# Patient Record
Sex: FEMALE | Race: BLACK OR AFRICAN AMERICAN | NOT HISPANIC OR LATINO | Employment: UNEMPLOYED
[De-identification: names, ages, dates, MRNs, and addresses within clinical notes are randomized per-mention and may not be internally consistent; named-entity substitution may affect disease eponyms.]

---

## 2017-08-12 ENCOUNTER — HEALTH MAINTENANCE LETTER (OUTPATIENT)
Age: 22
End: 2017-08-12

## 2019-09-11 ENCOUNTER — NURSE TRIAGE (OUTPATIENT)
Dept: NURSING | Facility: CLINIC | Age: 24
End: 2019-09-11

## 2019-09-11 NOTE — TELEPHONE ENCOUNTER
Supriya is shaving urination problems as she is incontinent with urine and also has white discharge.  Denies fever.  Supriya feels that she has lost some bladder control since her last child.      Reason for Disposition    [1] Can't control passage of urine (i.e., urinary incontinence) AND [2] new onset (< 2 weeks) or worsening    Additional Information    Negative: Shock suspected (e.g., cold/pale/clammy skin, too weak to stand, low BP, rapid pulse)    Negative: Sounds like a life-threatening emergency to the triager    Negative: [1] Unable to urinate (or only a few drops) > 4 hours AND     [2] bladder feels very full (e.g., palpable bladder or strong urge to urinate)    Negative: [1] Decreased urination and [2] drinking very little AND [2] dehydration suspected (e.g., dark urine, no urine > 12 hours, very dry mouth, very lightheaded)    Negative: Patient sounds very sick or weak to the triager    Negative: Fever > 100.5 F (38.1 C)    Negative: Side (flank) or lower back pain present    Protocols used: URINARY SYMPTOMS-A-AH

## 2019-11-06 ENCOUNTER — HEALTH MAINTENANCE LETTER (OUTPATIENT)
Age: 24
End: 2019-11-06

## 2020-11-22 ENCOUNTER — HEALTH MAINTENANCE LETTER (OUTPATIENT)
Age: 25
End: 2020-11-22

## 2021-09-19 ENCOUNTER — HEALTH MAINTENANCE LETTER (OUTPATIENT)
Age: 26
End: 2021-09-19

## 2022-01-09 ENCOUNTER — HEALTH MAINTENANCE LETTER (OUTPATIENT)
Age: 27
End: 2022-01-09

## 2022-11-21 ENCOUNTER — HEALTH MAINTENANCE LETTER (OUTPATIENT)
Age: 27
End: 2022-11-21

## 2023-04-16 ENCOUNTER — HEALTH MAINTENANCE LETTER (OUTPATIENT)
Age: 28
End: 2023-04-16

## 2023-06-08 RX ORDER — CHOLECALCIFEROL (VITAMIN D3) 50 MCG
1 TABLET ORAL DAILY
COMMUNITY

## 2023-06-08 NOTE — PROGRESS NOTES
[facility] OR FOLLOW UP VISIT    Patient: Supriya Feng YOB: 1995    Date of Exam: 23    Arrival Time:  41 PM  Departure Time:  40 PM    Charge Phone (written on paperwork): [phone]    Please be advised that this client resides in a facility in which narcotic medications are not permitted. If pain management is needed, please prescribe an alternative medication.     Supriya Feng is a 28 year old who presents for the following    Patient presents with:  nexplanon : Nexplanon removal   lab work: Hep C and HIV  UTI: STI testing, urine is off in color, urine frequency, no discharge, small amount of odor    28year-old female presents for Nexplanon removal. Currently at [facility] for substance use treatment: Cocaine, Marijuana, Alcohol. . Last pregnancy 2015 AB and Nexplanon inserted  Indicates needs to have it removed. She can feel it in her upper left arm but thinks it might have moved from spot it was intitially inserted. LMP 1 week ago by report and regular.  Has not taken any other precautions against pregnancy.   Would like full STI testing today.   Some urinary symptoms, no fever or abdominal pain. No burning on urination. Worried about STI    Reviewed Nexplanon removal procedure and all risks associated. Discussed that, because of long time nexplanon in place, may have increased adhesions and removal may be more difficult. Discussed potential adverse effects. Patient agrees to removal.       Review Of Systems  GEN: negative for fever  Genitourinary:Urinary symptoms as per HPI  PSYCH: Teary at times and verbalizes frustration, fear of removal and concern that she might not be able to conceive in future. Worried about general consent form she signed previously. Open to procedural consent       General Physical Exam:  Vitals: /71 (BP Location: Left arm, Patient Position: Sitting, Cuff Size: Adult Regular)   Pulse 67   Temp 98  F (36.7  C)  "(Oral)   Resp 17   Ht 1.689 m (5' 6.5\")   Wt 74.7 kg (164 lb 11.2 oz)   SpO2 100%   BMI 26.19 kg/m    GEN: alert female in NAD.   PSYCH: Teary at times. Calms to voice.   Progestin Implant Removal Note    Supriya Feng is here for removal of her Nexplanon implant. She would like it removed because past due, currently in treatment and newly .     An informed consent was taken prior to removal and is to be scanned into the Electronic Health Record. Risks of the procedure include: bleeding, infection, difficult removal, scarring, and nerve damage. There may be bruising at the site of incision and down the arm.     Procedure Note:  Team: MEENU Chairez CNP  Procedure: Progestin Implant Removal (Nexplanon)  Side: Left  Position correct for procedure: Yes  Equipment for procedure available: Yes    The patient is placed in the supine position. Asceptic conditions are maintained. The elio is located by palpation. The area is cleaned with antiseptic. 3 cc of 1% lidocaine  is injected just underneath the end of the implant closest to the elbow. After firmly pressing down on the end of the implant closer to the axilla, a 2-3 mm incision is made with a #11 scalpel. The elio is pushed to the incision site and grasped with a sterile forceps and gently removed. Blunt dissection wast needed. The patient tolerated the procedure well. The 4 cm elio was removed in its entirety. The incision was dressed with a small adhesive bandage closure and a pressure dressing was applied.     An alternate plan for contraception was discussed. The patient is not desiring contraception at this itme. She was given a detailed instruction sheet about her desired method of contraception.     MEENU Chairez CNP        Assessment / Plan:    1. Nexplanon removal  Nexplanon removed intact without incident  OK to use standard OTC meds for comfort as directed    2. Routine screening for STI (sexually transmitted " infection)  Discussed STI screening and potential tests. She would like all of them  - HIV Antigen Antibody Combo; Future  - NEISSERIA GONORRHOEA PCR; Future  - CHLAMYDIA TRACHOMATIS PCR; Future  - Treponema Abs w Reflex to RPR and Titer; Future  - NEISSERIA GONORRHOEA PCR  - CHLAMYDIA TRACHOMATIS PCR  - HIV Antigen Antibody Combo  - Treponema Abs w Reflex to RPR and Titer    3. Need for hepatitis B screening test  Discussed testing  - Hepatitis B Surface Antibody; Future  - Hepatitis B surface antigen; Future  - Hepatitis B core antibody; Future  - Hepatitis B Surface Antibody  - Hepatitis B surface antigen  - Hepatitis B core antibody    4. Screening for viral disease  Discussed  - Hepatitis C Screen Reflex to HCV RNA Quant and Genotype; Future  - Hepatitis Antibody A IgG; Future  - Hepatitis A antibody IgM; Future  - Hepatitis C Screen Reflex to HCV RNA Quant and Genotype  - Hepatitis Antibody A IgG  - Hepatitis A antibody IgM    5. Pregnancy examination or test, pregnancy unconfirmed  Negative, released to patent. Reassured that Nexplanon would not affect fertility  - HCG Qualitative Urine (UPT) ([facility]); Future  - HCG Qualitative Urine (UPT) ([facility])    Referrals Made:   Referral to [facility]: [place] unable to obtain all lab tests. She will notify [facility] of need to go to [facility] for additional labs.  If a referral was made to a [facility] and you don't get a call from central scheduling please call [phone].  If a Mammogram was ordered for you at [facility] call [phone] to schedule or change your appointment.  If you had an XRay/CT/Ultrasound/MRI ordered the number is [phone] to schedule or change your radiology appointment.     Follow up as needed    Medication changes made at today's visit:   MEDICATIONS:   Orders Placed This Encounter   Medications     Multiple Vitamin (MULTIVITAMIN ADULT PO)     Sig: Take 1 tablet by mouth daily     vitamin D3  (CHOLECALCIFEROL) 50 mcg (2000 units) tablet     Sig: Take 1 tablet by mouth daily     Medications Discontinued During This Encounter   Medication Reason     levonorgestrel-ethinyl estradiol (AVIANE,PEDRO,LESSINA) 0.1-20 MG-MCG per tablet Discontinued by another Health Care Provider (No AVS)          - Continue other medications without change    MEENU Chairez CNP

## 2023-06-13 ENCOUNTER — OFFICE VISIT (OUTPATIENT)
Dept: FAMILY MEDICINE | Facility: CLINIC | Age: 28
End: 2023-06-13

## 2023-06-13 VITALS
HEIGHT: 67 IN | SYSTOLIC BLOOD PRESSURE: 106 MMHG | OXYGEN SATURATION: 100 % | WEIGHT: 164.7 LBS | RESPIRATION RATE: 17 BRPM | HEART RATE: 67 BPM | TEMPERATURE: 98 F | BODY MASS INDEX: 25.85 KG/M2 | DIASTOLIC BLOOD PRESSURE: 71 MMHG

## 2023-06-13 DIAGNOSIS — Z11.3 ROUTINE SCREENING FOR STI (SEXUALLY TRANSMITTED INFECTION): Primary | ICD-10-CM

## 2023-06-13 DIAGNOSIS — Z32.00 PREGNANCY EXAMINATION OR TEST, PREGNANCY UNCONFIRMED: ICD-10-CM

## 2023-06-13 DIAGNOSIS — Z11.59 NEED FOR HEPATITIS B SCREENING TEST: ICD-10-CM

## 2023-06-13 DIAGNOSIS — Z11.59 SCREENING FOR VIRAL DISEASE: ICD-10-CM

## 2023-06-13 DIAGNOSIS — Z30.46 NEXPLANON REMOVAL: ICD-10-CM

## 2023-06-13 LAB — HCG UR QL: NEGATIVE

## 2023-06-13 PROCEDURE — 87491 CHLMYD TRACH DNA AMP PROBE: CPT | Mod: ORL | Performed by: NURSE PRACTITIONER

## 2023-06-13 PROCEDURE — 86708 HEPATITIS A ANTIBODY: CPT | Mod: ORL | Performed by: NURSE PRACTITIONER

## 2023-06-13 PROCEDURE — 86709 HEPATITIS A IGM ANTIBODY: CPT | Mod: ORL | Performed by: NURSE PRACTITIONER

## 2023-06-13 PROCEDURE — 86704 HEP B CORE ANTIBODY TOTAL: CPT | Mod: ORL | Performed by: NURSE PRACTITIONER

## 2023-06-13 PROCEDURE — 86706 HEP B SURFACE ANTIBODY: CPT | Mod: ORL | Performed by: NURSE PRACTITIONER

## 2023-06-13 PROCEDURE — 87591 N.GONORRHOEAE DNA AMP PROB: CPT | Mod: ORL | Performed by: NURSE PRACTITIONER

## 2023-06-13 ASSESSMENT — ANXIETY QUESTIONNAIRES
GAD7 TOTAL SCORE: 0
3. WORRYING TOO MUCH ABOUT DIFFERENT THINGS: NOT AT ALL
7. FEELING AFRAID AS IF SOMETHING AWFUL MIGHT HAPPEN: NOT AT ALL
2. NOT BEING ABLE TO STOP OR CONTROL WORRYING: NOT AT ALL
1. FEELING NERVOUS, ANXIOUS, OR ON EDGE: NOT AT ALL
6. BECOMING EASILY ANNOYED OR IRRITABLE: NOT AT ALL
5. BEING SO RESTLESS THAT IT IS HARD TO SIT STILL: NOT AT ALL
GAD7 TOTAL SCORE: 0

## 2023-06-13 ASSESSMENT — PAIN SCALES - GENERAL: PAINLEVEL: NO PAIN (0)

## 2023-06-13 ASSESSMENT — PATIENT HEALTH QUESTIONNAIRE - PHQ9
5. POOR APPETITE OR OVEREATING: NOT AT ALL
SUM OF ALL RESPONSES TO PHQ QUESTIONS 1-9: 4

## 2023-06-13 NOTE — NURSING NOTE
"ROOM:Procedure Room  DMITRY RUSSO    Preferred Name: Supriya     How did you hear about us?  [facility]    28 year old  Chief Complaint   Patient presents with     nexplanon      Nexplanon removal      lab work     Hep C and HIV     UTI     STI testing, urine is off in color, urine frequency, no discharge, small amount of odor       Blood pressure 106/71, pulse 67, temperature 98  F (36.7  C), temperature source Oral, resp. rate 17, height 1.689 m (5' 6.5\"), weight 74.7 kg (164 lb 11.2 oz), SpO2 100 %. Body mass index is 26.19 kg/m .  BP completed using cuff size:        There is no problem list on file for this patient.      Wt Readings from Last 2 Encounters:   06/13/23 74.7 kg (164 lb 11.2 oz)   06/13/12 76.2 kg (168 lb) (92 %, Z= 1.43)*     * Growth percentiles are based on [facility] (Girls, 2-20 Years) data.     BP Readings from Last 3 Encounters:   06/13/23 106/71   06/13/12 106/68   03/05/12 125/79       No Known Allergies    Current Outpatient Medications   Medication     Multiple Vitamin (MULTIVITAMIN ADULT PO)     vitamin D3 (CHOLECALCIFEROL) 50 mcg (2000 units) tablet     No current facility-administered medications for this visit.       Social History     Tobacco Use     Smoking status: Former     Types: Cigarettes     Smokeless tobacco: Never   Vaping Use     Vaping status: Former   Substance Use Topics     Alcohol use: Not Currently     Drug use: No       Honoring Choices - Health Care Directive Guide offered to patient at time of visit.    Health Maintenance Due   Topic Date Due     ADVANCE CARE PLANNING  Never done     DTAP/TDAP/TD IMMUNIZATION (6 - Tdap) 09/07/2006     YEARLY PREVENTIVE VISIT  04/13/2012     PAP  02/02/2015       Immunization History   Administered Date(s) Administered     COVID-19 Bivalent 18+ (Moderna) 05/05/2023     DTAP (<7y) 1994, 1994, 1994, 02/22/1995, 02/23/1998     HIB (PRP-T) 1994, 1994, 1994, 02/22/1995     HPV 04/10/2008, " 06/17/2008, 04/13/2011     HepB 1994, 1994, 1994     MMR 02/22/1995, 04/23/2002     Meningococcal (Menomune ) 09/13/2006     Poliovirus, inactivated (IPV) 1994, 1994, 1994, 04/23/2002     TD,PF 7+ (Tenivac) 09/06/2006     Varicella 02/27/1996, 09/13/2006       Lab Results   Component Value Date    PAP NIL 04/13/2011       No lab results found.        6/13/2023     2:58 PM   PHQ-2 ( 1999 Pfizer)   Q1: Little interest or pleasure in doing things 3   Q2: Feeling down, depressed or hopeless 1   PHQ-2 Score 4           6/13/2023     2:58 PM   PHQ-9 SCORE   PHQ-9 Total Score 4           6/13/2023     2:58 PM   KRUNAL-7 SCORE   Total Score 0            View : No data to display.                Tami Ponce, EMT    June 13, 2023 3:16 PM

## 2023-06-13 NOTE — PATIENT INSTRUCTIONS
Nexplanon removal  Nexplanon was removed per standard procedure without incident  Keep pressure dressing on for 24 hours then cover with bandaid for 48 hours.   Watch for any signs of infection: Swelling, redness, pus drainage, fever  Seek care if this occurs  If dressing gets too tight, will need to be loosened.   May have bruising, soreness in arm. OK to move but avoid bending a lot.  May take tylenol or ibuprofen as directed for pain    STI screening today: Hepatitis C, HIV, Hepatitis A and B, Syphilis, Chlamydia and Gonorrhea    UPT was negative

## 2023-06-13 NOTE — LETTER
June 14, 2023      Supriya WHYTE Ping  [address] [address]        Dear ,    We are writing to inform you of your test results.    Your test results fall within the expected range(s) or remain unchanged from previous results.  Please continue with current treatment plan.  Stefan Epps, here are your lab results. The chlamydia and gonorrehea tests are negative. You do not have hepatitis A and B and you do not have immunity to these viruses. You could start on the vaccine series. Please return if you would like to start these. [facility] the [facility] of your lab tests. Your urine pregnancy test is negative. Thank you.   Resulted Orders   HCG Qualitative Urine (UPT) ([facility])   Result Value Ref Range    hCG Urine Qualitative Negative Negative      Comment:      This test is for screening purposes.  Results should be interpreted along with the clinical picture.  Confirmation testing is available if warranted by ordering [id number], HCG Quantitative Pregnancy.   NEISSERIA GONORRHOEA PCR   Result Value Ref Range    Neisseria gonorrhoeae Negative Negative      Comment:      Negative for N. gonorrhoeae rRNA by transcription mediated amplification. A negative result by transcription mediated amplification does not preclude the presence of C. trachomatis infection because results are dependent on proper and adequate collection, absence of inhibitors and sufficient rRNA to be detected.   CHLAMYDIA TRACHOMATIS PCR   Result Value Ref Range    Chlamydia trachomatis Negative Negative      Comment:      A negative result by transcription mediated amplification does not preclude the presence of C. trachomatis infection because results are dependent on proper and adequate collection, absence of inhibitors and sufficient rRNA to be detected.   Hepatitis Antibody A IgG   Result Value Ref Range    Hepatitis A Antibody IgG Nonreactive Nonreactive    Narrative     This assay cannot be used for the diagnosis of acute HAV infection.   Hepatitis B Surface Antibody   Result Value Ref Range    Hepatitis B Surface Antibody Instrument Value 0.38 <8.00 m[IU]/mL    Hepatitis B Surface Antibody Nonreactive       Comment:      No antibody detected when the value is less than 8.00 mIU/mL.   Hepatitis B core antibody   Result Value Ref Range    Hepatitis B Core Antibody Total Nonreactive Nonreactive   Hepatitis A antibody IgM   Result Value Ref Range    Hepatitis A Antibody IgM Nonreactive Nonreactive      Comment:      IgM anti-HAV not detected. Does not exclude the possibility of recent exposure to or infection with HAV.       If you have any questions or concerns, please call the clinic at the number listed above.       Sincerely,      MEENU Chairez CNP

## 2023-06-14 LAB
C TRACH DNA SPEC QL NAA+PROBE: NEGATIVE
HAV IGG SER QL IA: NONREACTIVE
HAV IGM SERPL QL IA: NONREACTIVE
HBV CORE AB SERPL QL IA: NONREACTIVE
HBV SURFACE AB SERPL IA-ACNC: 0.38 M[IU]/ML
HBV SURFACE AB SERPL IA-ACNC: NONREACTIVE M[IU]/ML
N GONORRHOEA DNA SPEC QL NAA+PROBE: NEGATIVE

## 2023-06-15 ENCOUNTER — LAB (OUTPATIENT)
Dept: LAB | Facility: CLINIC | Age: 28
End: 2023-06-15

## 2023-06-15 DIAGNOSIS — Z11.59 NEED FOR HEPATITIS B SCREENING TEST: ICD-10-CM

## 2023-06-15 DIAGNOSIS — Z11.59 SCREENING FOR VIRAL DISEASE: ICD-10-CM

## 2023-06-15 DIAGNOSIS — Z11.3 ROUTINE SCREENING FOR STI (SEXUALLY TRANSMITTED INFECTION): ICD-10-CM

## 2023-06-15 PROCEDURE — 86780 TREPONEMA PALLIDUM: CPT | Mod: 90 | Performed by: PATHOLOGY

## 2023-06-15 PROCEDURE — 87389 HIV-1 AG W/HIV-1&-2 AB AG IA: CPT | Mod: 90 | Performed by: PATHOLOGY

## 2023-06-15 PROCEDURE — 99000 SPECIMEN HANDLING OFFICE-LAB: CPT | Performed by: PATHOLOGY

## 2023-06-15 PROCEDURE — 36415 COLL VENOUS BLD VENIPUNCTURE: CPT | Performed by: PATHOLOGY

## 2023-06-15 PROCEDURE — 86803 HEPATITIS C AB TEST: CPT | Mod: 90 | Performed by: PATHOLOGY

## 2023-06-15 PROCEDURE — 87340 HEPATITIS B SURFACE AG IA: CPT | Mod: 90 | Performed by: PATHOLOGY

## 2023-06-16 LAB
HBV SURFACE AG SERPL QL IA: NONREACTIVE
HCV AB SERPL QL IA: NONREACTIVE
HIV 1+2 AB+HIV1 P24 AG SERPL QL IA: NONREACTIVE
T PALLIDUM AB SER QL: NONREACTIVE

## 2023-09-27 NOTE — PROGRESS NOTES
"[facility] ACUTE OR FOLLOW UP VISIT    Patient: Supriya Fegn YOB: 1995    Date of Exam: 9/28/23    Arrival Time: 09 15 AM  Departure Time: 10 30 AM    Charge Phone (written on paperwork): [phone]    Please be advised that this client resides in a facility in which narcotic medications are not permitted. If pain management is needed, please prescribe an alternative medication.     Supriya Feng is a 28 year old who presents for the following    Patient presents with:  boil: Infected boil on backside on rt buttock, painful   Sitting painful and warm to the touch     Supriya has a lesion on her left upper leg that is firm and recently she \"popped\" it and it is draining.  She states she has had frequent boils in the past on her upper legs and buttocks that have mostly resolved themselves, this one is more painful.      Supriya states that she had trichomonas diagnosed in January of 2023 elsewhere that she never treated.  She also was recently told by a male sexual partner that she gave him gonorrhea, she was tested in June here and it was negative.  She has no symptoms of any discomfort, drainage of odor.      Do you need any refills on your Medications today? No    Review Of Systems   ROS: 10 point ROS neg other than the symptoms noted above in the HPI.      General Physical Exam:  Vitals: BP 97/66   Pulse 70   Temp 97.8  F (36.6  C) (Oral)   Ht 1.712 m (5' 7.4\")   Wt 75.3 kg (166 lb)   SpO2 99%   BMI 25.69 kg/m    Constitutional:   awake, alert, cooperative, no apparent distress, and appears stated age   , Neuropsychiatric:   General: normal, calm and normal eye contact  Level of consciousness: alert / normal  Affect: normal  Orientation: oriented to self, place, time and situation  Memory and insight: normal, memory for past and recent events intact and thought process normal    and Skin:   An subdermal lesion, firm and painful, 1 cm diameter, left upper leg, open " area with slight non- odorous drainage.       Additional Comments:N/A    Assessment / Plan:  (L02.429) Boil of lower extremity  (primary encounter diagnosis)  Comment: Redressed, discussed treatment  Plan: cephALEXin (KEFLEX) 500 MG capsule    (Z20.2) Potential exposure to STD    Plan: NEISSERIA GONORRHOEA PCR, CHLAMYDIA TRACHOMATIS        PCR, Multiplex Vaginal Panel by PCR    Supriya will be going to long term at [facility] and will have follow up then or before as we see results.    Referrals Made:   NO REFERRALS MADE TODAY  If a referral was made to a [facility] and you don't get a call from central scheduling please call [phone].  If a Mammogram was ordered for you at [facility] call [phone] to schedule or change your appointment.  If you had an XRay/CT/Ultrasound/MRI ordered the number is [phone] to schedule or change your radiology appointment.     Follow up as needed    Medication changes made at today's visit: MEDICATIONS:        - Continue other medications without change    Cathy Hill NP

## 2023-09-28 ENCOUNTER — OFFICE VISIT (OUTPATIENT)
Dept: FAMILY MEDICINE | Facility: CLINIC | Age: 28
End: 2023-09-28

## 2023-09-28 VITALS
TEMPERATURE: 97.8 F | DIASTOLIC BLOOD PRESSURE: 66 MMHG | HEIGHT: 67 IN | WEIGHT: 166 LBS | SYSTOLIC BLOOD PRESSURE: 97 MMHG | BODY MASS INDEX: 26.06 KG/M2 | HEART RATE: 70 BPM | OXYGEN SATURATION: 99 %

## 2023-09-28 DIAGNOSIS — L02.429 BOIL OF LOWER EXTREMITY: Primary | ICD-10-CM

## 2023-09-28 DIAGNOSIS — Z20.2 POTENTIAL EXPOSURE TO STD: ICD-10-CM

## 2023-09-28 LAB
BACTERIAL VAGINOSIS VAG-IMP: POSITIVE
CANDIDA DNA VAG QL NAA+PROBE: DETECTED
CANDIDA GLABRATA / CANDIDA KRUSEI DNA: NOT DETECTED
T VAGINALIS DNA VAG QL NAA+PROBE: DETECTED

## 2023-09-28 PROCEDURE — 87491 CHLMYD TRACH DNA AMP PROBE: CPT | Mod: ORL | Performed by: NURSE PRACTITIONER

## 2023-09-28 PROCEDURE — 87591 N.GONORRHOEAE DNA AMP PROB: CPT | Mod: ORL | Performed by: NURSE PRACTITIONER

## 2023-09-28 PROCEDURE — 0352U MULTIPLEX VAGINAL PANEL BY PCR: CPT | Mod: ORL | Performed by: NURSE PRACTITIONER

## 2023-09-28 RX ORDER — IBUPROFEN 600 MG/1
600 TABLET, FILM COATED ORAL 3 TIMES DAILY PRN
COMMUNITY
Start: 2023-09-22 | End: 2024-08-28

## 2023-09-28 RX ORDER — GABAPENTIN 100 MG/1
1-2 CAPSULE ORAL 3 TIMES DAILY PRN
COMMUNITY
Start: 2023-09-22 | End: 2024-08-28

## 2023-09-28 RX ORDER — HYDROXYZINE HYDROCHLORIDE 25 MG/1
25 TABLET, FILM COATED ORAL 3 TIMES DAILY PRN
COMMUNITY
Start: 2023-09-22 | End: 2024-08-28

## 2023-09-28 RX ORDER — CEPHALEXIN 500 MG/1
500 CAPSULE ORAL 3 TIMES DAILY
Qty: 30 CAPSULE | Refills: 0 | Status: SHIPPED | OUTPATIENT
Start: 2023-09-28 | End: 2023-11-09

## 2023-09-28 RX ORDER — ONDANSETRON 4 MG/1
8 TABLET, FILM COATED ORAL EVERY 8 HOURS PRN
COMMUNITY
Start: 2023-09-22

## 2023-09-28 RX ORDER — CLONIDINE HYDROCHLORIDE 0.1 MG/1
0.1 TABLET ORAL 3 TIMES DAILY PRN
COMMUNITY
Start: 2023-09-22 | End: 2024-08-28

## 2023-09-28 RX ORDER — TRAZODONE HYDROCHLORIDE 50 MG/1
TABLET, FILM COATED ORAL
COMMUNITY
Start: 2023-09-27 | End: 2023-11-09

## 2023-09-28 NOTE — NURSING NOTE
"ROOM:1  ANNALEE ROSAS    Preferred Name: Supriya     How did you hear about us?  Other - [facility]    28 year old  Chief Complaint   Patient presents with     boil     Infected boil on backside on rt buttock, painful   Sitting painful and warm to the touch        Blood pressure 97/66, pulse 70, temperature 97.8  F (36.6  C), temperature source Oral, height 1.712 m (5' 7.4\"), weight 75.3 kg (166 lb), SpO2 99 %. Body mass index is 25.69 kg/m .  BP completed using cuff size:        There is no problem list on file for this patient.      Wt Readings from Last 2 Encounters:   09/28/23 75.3 kg (166 lb)   06/13/23 74.7 kg (164 lb 11.2 oz)     BP Readings from Last 3 Encounters:   09/28/23 97/66   06/13/23 106/71   06/13/12 106/68       No Known Allergies    Current Outpatient Medications   Medication     cloNIDine (CATAPRES) 0.1 MG tablet     gabapentin (NEURONTIN) 100 MG capsule     hydrOXYzine (ATARAX) 25 MG tablet     ibuprofen (ADVIL/MOTRIN) 600 MG tablet     Multiple Vitamin (MULTIVITAMIN ADULT PO)     ondansetron (ZOFRAN) 4 MG tablet     traZODone (DESYREL) 50 MG tablet     vitamin D3 (CHOLECALCIFEROL) 50 mcg (2000 units) tablet     No current facility-administered medications for this visit.       Social History     Tobacco Use     Smoking status: Former     Types: Cigarettes     Smokeless tobacco: Never   Vaping Use     Vaping Use: Former   Substance Use Topics     Alcohol use: Not Currently     Comment: history     Drug use: Not Currently     Types: \"Crack\" cocaine, Marijuana       Honoring Choices - Health Care Directive Guide offered to patient at time of visit.    Health Maintenance Due   Topic Date Due     ADVANCE CARE PLANNING  Never done     DTAP/TDAP/TD IMMUNIZATION (6 - Tdap) 09/07/2006     YEARLY PREVENTIVE VISIT  04/13/2012     PAP  02/02/2015     INFLUENZA VACCINE (1) 09/01/2023       Immunization History   Administered Date(s) Administered     COVID-19 Bivalent 18+ (Moderna) 05/05/2023     DTAP (<7y) " 1994, 1994, 1994, 02/22/1995, 02/23/1998     HIB (PRP-T) 1994, 1994, 1994, 02/22/1995     HPV 04/10/2008, 06/17/2008, 04/13/2011     HepB 1994, 1994, 1994     MMR 02/22/1995, 04/23/2002     Meningococcal (Menomune ) 09/13/2006     Poliovirus, inactivated (IPV) 1994, 1994, 1994, 04/23/2002     TD,PF 7+ (Tenivac) 09/06/2006     Varicella 02/27/1996, 09/13/2006       Lab Results   Component Value Date    PAP NIL 04/13/2011       No lab results found.        6/13/2023     2:58 PM   PHQ-2 ( 1999 Pfizer)   Q1: Little interest or pleasure in doing things 3   Q2: Feeling down, depressed or hopeless 1   PHQ-2 Score 4           6/13/2023     2:58 PM   PHQ-9 SCORE   PHQ-9 Total Score 4           6/13/2023     2:58 PM   KRUNAL-7 SCORE   Total Score 0            No data to display                Deangelo Rock    September 28, 2023 9:29 AM

## 2023-09-29 LAB
C TRACH DNA SPEC QL NAA+PROBE: POSITIVE
N GONORRHOEA DNA SPEC QL NAA+PROBE: POSITIVE

## 2023-10-05 ENCOUNTER — OFFICE VISIT (OUTPATIENT)
Dept: FAMILY MEDICINE | Facility: CLINIC | Age: 28
End: 2023-10-05

## 2023-10-05 VITALS
HEART RATE: 60 BPM | SYSTOLIC BLOOD PRESSURE: 105 MMHG | OXYGEN SATURATION: 100 % | BODY MASS INDEX: 24.64 KG/M2 | TEMPERATURE: 98.4 F | WEIGHT: 157 LBS | HEIGHT: 67 IN | DIASTOLIC BLOOD PRESSURE: 69 MMHG

## 2023-10-05 DIAGNOSIS — B96.89 BACTERIAL VAGINOSIS: ICD-10-CM

## 2023-10-05 DIAGNOSIS — A74.9 CHLAMYDIA: ICD-10-CM

## 2023-10-05 DIAGNOSIS — A54.9 GONORRHEA: ICD-10-CM

## 2023-10-05 DIAGNOSIS — B37.31 YEAST INFECTION OF THE VAGINA: ICD-10-CM

## 2023-10-05 DIAGNOSIS — A59.00 GU INFECTION, TRICHOMONAL: ICD-10-CM

## 2023-10-05 DIAGNOSIS — N76.0 BACTERIAL VAGINOSIS: ICD-10-CM

## 2023-10-05 DIAGNOSIS — Z20.2 POTENTIAL EXPOSURE TO STD: Primary | ICD-10-CM

## 2023-10-05 RX ORDER — CEFTRIAXONE SODIUM 1 G
500 VIAL (EA) INJECTION ONCE
Status: COMPLETED | OUTPATIENT
Start: 2023-10-05 | End: 2023-10-05

## 2023-10-05 RX ORDER — DOXYCYCLINE HYCLATE 100 MG
100 TABLET ORAL 2 TIMES DAILY
Qty: 14 TABLET | Refills: 0 | Status: SHIPPED | OUTPATIENT
Start: 2023-10-05 | End: 2023-11-09

## 2023-10-05 RX ORDER — METRONIDAZOLE 500 MG/1
500 TABLET ORAL 2 TIMES DAILY
Qty: 14 TABLET | Refills: 0 | Status: SHIPPED | OUTPATIENT
Start: 2023-10-05 | End: 2023-10-12

## 2023-10-05 RX ORDER — FLUCONAZOLE 150 MG/1
150 TABLET ORAL
Qty: 4 TABLET | Refills: 0 | Status: SHIPPED | OUTPATIENT
Start: 2023-10-05 | End: 2023-11-09

## 2023-10-05 RX ADMIN — Medication 500 MG: at 02:55

## 2023-10-05 NOTE — NURSING NOTE
"ROOM:2  ANNALEE ROSAS    Preferred Name: Supriya     How did you hear about us?  Other - [facility]    28 year old  Chief Complaint   Patient presents with     Results       Blood pressure 105/69, pulse 60, temperature 98.4  F (36.9  C), temperature source Oral, height 1.699 m (5' 6.9\"), weight 71.2 kg (157 lb), SpO2 100 %. Body mass index is 24.66 kg/m .  BP completed using cuff size:        There is no problem list on file for this patient.      Wt Readings from Last 2 Encounters:   10/05/23 71.2 kg (157 lb)   09/28/23 75.3 kg (166 lb)     BP Readings from Last 3 Encounters:   10/05/23 105/69   09/28/23 97/66   06/13/23 106/71       No Known Allergies    Current Outpatient Medications   Medication     cephALEXin (KEFLEX) 500 MG capsule     cloNIDine (CATAPRES) 0.1 MG tablet     gabapentin (NEURONTIN) 100 MG capsule     hydrOXYzine (ATARAX) 25 MG tablet     ibuprofen (ADVIL/MOTRIN) 600 MG tablet     Multiple Vitamin (MULTIVITAMIN ADULT PO)     ondansetron (ZOFRAN) 4 MG tablet     traZODone (DESYREL) 50 MG tablet     vitamin D3 (CHOLECALCIFEROL) 50 mcg (2000 units) tablet     No current facility-administered medications for this visit.       Social History     Tobacco Use     Smoking status: Former     Types: Cigarettes     Smokeless tobacco: Never   Vaping Use     Vaping Use: Former   Substance Use Topics     Alcohol use: Not Currently     Comment: history     Drug use: Not Currently     Types: \"Crack\" cocaine, Marijuana       Honoring Choices - Health Care Directive Guide offered to patient at time of visit.    Health Maintenance Due   Topic Date Due     ADVANCE CARE PLANNING  Never done     DTAP/TDAP/TD IMMUNIZATION (6 - Tdap) 09/07/2006     YEARLY PREVENTIVE VISIT  04/13/2012     PAP  02/02/2015     INFLUENZA VACCINE (1) 09/01/2023     COVID-19 Vaccine (2 - 2023-24 season) 09/01/2023       Immunization History   Administered Date(s) Administered     COVID-19 Bivalent 18+ (Moderna) 05/05/2023     DTAP (<7y) " 1994, 1994, 1994, 02/22/1995, 02/23/1998     HIB (PRP-T) 1994, 1994, 1994, 02/22/1995     HPV 04/10/2008, 06/17/2008, 04/13/2011     HepB 1994, 1994, 1994     MMR 02/22/1995, 04/23/2002     Meningococcal (Menomune ) 09/13/2006     Poliovirus, inactivated (IPV) 1994, 1994, 1994, 04/23/2002     TD,PF 7+ (Tenivac) 09/06/2006     Varicella 02/27/1996, 09/13/2006       Lab Results   Component Value Date    PAP NIL 04/13/2011       No lab results found.        6/13/2023     2:58 PM   PHQ-2 ( 1999 Pfizer)   Q1: Little interest or pleasure in doing things 3   Q2: Feeling down, depressed or hopeless 1   PHQ-2 Score 4           6/13/2023     2:58 PM   PHQ-9 SCORE   PHQ-9 Total Score 4           6/13/2023     2:58 PM   KRUNAL-7 SCORE   Total Score 0            No data to display                Deangelo Rock    October 5, 2023 1:57 PM

## 2023-10-05 NOTE — PROGRESS NOTES
"[facility] OR FOLLOW UP VISIT    Patient: Supriya Feng YOB: 1995    Date of Exam: 10/05/23    Arrival Time: 01 31 PM  Departure Time: 03 00 PM    Charge Phone (written on paperwork): [phone]    Please be advised that this client resides in a facility in which narcotic medications are not permitted. If pain management is needed, please prescribe an alternative medication.     Supriya Feng is a 28 year old who presents for the following    Patient presents with:  Results    Supriya is here to follow up on testing done for std's.  She is feeling some urinary symptoms of urgency, otherwise no particular symptoms today.    Do you need any refills on your Medications today? No    Review Of Systems   ROS: 10 point ROS neg other than the symptoms noted above in the HPI.      General Physical Exam:  Vitals: /69   Pulse 60   Temp 98.4  F (36.9  C) (Oral)   Ht 1.699 m (5' 6.9\")   Wt 71.2 kg (157 lb)   SpO2 100%   BMI 24.66 kg/m    Constitutional:   awake, alert and severe distress    and Neuropsychiatric:   General: restless, aggitated, fidgeting and poor eye contact  Level of consciousness: alert / normal  Affect: angry  Orientation: oriented to self, place, time and situation  Memory and insight: normal, memory for past and recent events intact and thought process normal         Additional Comments:N/A    Assessment / Plan:  (Z20.2) Potential exposure to STD  (primary encounter diagnosis)    Plan: HIV Antigen Antibody Combo, Treponema Abs w         Reflex to RPR and Titer, Hepatitis C Screen         Reflex to HCV RNA Quant and Genotype, Hepatitis        B surface antigen      (A54.9) Gonorrhea    Plan: cefTRIAXone (ROCEPHIN) in lidocaine 1% (PF) for        IM administration only 500 mg    (N76.0,  B96.89) Bacterial vaginosis    Plan: metroNIDAZOLE (FLAGYL) 500 MG tablet    (A59.00)  infection, trichomonal    Plan: metroNIDAZOLE (FLAGYL) 500 MG " tablet    (B37.31) Yeast infection of the vagina    Plan: fluconazole (DIFLUCAN) 150 MG tablet    (A74.9) Chlamydia    Plan: doxycycline hyclate (VIBRA-TABS) 100 MG tablet    We reviewed all infections and treated.  [place] [facility] [place] wants to have.  We will go over the blood work when available.        Referrals Made:   NO REFERRALS MADE TODAY  If a referral was made to a [facility] and you don't get a call from central scheduling please call [phone].  If a Mammogram was ordered for you at [facility] call [phone] to schedule or change your appointment.  If you had an XRay/CT/Ultrasound/MRI ordered the number is [phone] to schedule or change your radiology appointment.     Follow up as noted    Medication changes made at today's visit: MEDICATIONS:        - Continue other medications without change    Cathy Hill NP     Supriya was very upset and crying when she left, she was not interested in discussing anything.

## 2023-11-09 ENCOUNTER — OFFICE VISIT (OUTPATIENT)
Dept: FAMILY MEDICINE | Facility: CLINIC | Age: 28
End: 2023-11-09

## 2023-11-09 VITALS
HEIGHT: 68 IN | OXYGEN SATURATION: 97 % | SYSTOLIC BLOOD PRESSURE: 110 MMHG | WEIGHT: 178 LBS | BODY MASS INDEX: 26.98 KG/M2 | DIASTOLIC BLOOD PRESSURE: 74 MMHG | HEART RATE: 86 BPM | TEMPERATURE: 97.7 F

## 2023-11-09 DIAGNOSIS — F19.10 SUBSTANCE ABUSE (H): ICD-10-CM

## 2023-11-09 DIAGNOSIS — L02.92 BOIL: ICD-10-CM

## 2023-11-09 DIAGNOSIS — Z00.00 ROUTINE HISTORY AND PHYSICAL EXAMINATION OF ADULT: Primary | ICD-10-CM

## 2023-11-09 DIAGNOSIS — B35.3 TINEA PEDIS OF BOTH FEET: ICD-10-CM

## 2023-11-09 DIAGNOSIS — Z59.89 LIVING ACCOMMODATION ISSUES: ICD-10-CM

## 2023-11-09 LAB
BASOPHILS # BLD AUTO: 0 10E3/UL (ref 0–0.2)
BASOPHILS NFR BLD AUTO: 1 %
EOSINOPHIL # BLD AUTO: 0.1 10E3/UL (ref 0–0.7)
EOSINOPHIL NFR BLD AUTO: 2 %
ERYTHROCYTE [DISTWIDTH] IN BLOOD BY AUTOMATED COUNT: 13.2 % (ref 10–15)
HCG UR QL: NEGATIVE
HCT VFR BLD AUTO: 38.7 % (ref 35–47)
HGB BLD-MCNC: 12.4 G/DL (ref 11.7–15.7)
IMM GRANULOCYTES # BLD: 0 10E3/UL
IMM GRANULOCYTES NFR BLD: 0 %
LYMPHOCYTES # BLD AUTO: 2.3 10E3/UL (ref 0.8–5.3)
LYMPHOCYTES NFR BLD AUTO: 41 %
MCH RBC QN AUTO: 31.7 PG (ref 26.5–33)
MCHC RBC AUTO-ENTMCNC: 32 G/DL (ref 31.5–36.5)
MCV RBC AUTO: 99 FL (ref 78–100)
MONOCYTES # BLD AUTO: 0.6 10E3/UL (ref 0–1.3)
MONOCYTES NFR BLD AUTO: 10 %
NEUTROPHILS # BLD AUTO: 2.6 10E3/UL (ref 1.6–8.3)
NEUTROPHILS NFR BLD AUTO: 46 %
NRBC # BLD AUTO: 0 10E3/UL
NRBC BLD AUTO-RTO: 0 /100
PLATELET # BLD AUTO: 353 10E3/UL (ref 150–450)
RBC # BLD AUTO: 3.91 10E6/UL (ref 3.8–5.2)
WBC # BLD AUTO: 5.6 10E3/UL (ref 4–11)

## 2023-11-09 PROCEDURE — 87624 HPV HI-RISK TYP POOLED RSLT: CPT | Mod: ORL | Performed by: NURSE PRACTITIONER

## 2023-11-09 PROCEDURE — 86803 HEPATITIS C AB TEST: CPT | Mod: ORL | Performed by: NURSE PRACTITIONER

## 2023-11-09 PROCEDURE — 87389 HIV-1 AG W/HIV-1&-2 AB AG IA: CPT | Mod: ORL | Performed by: NURSE PRACTITIONER

## 2023-11-09 PROCEDURE — 86709 HEPATITIS A IGM ANTIBODY: CPT | Mod: ORL | Performed by: NURSE PRACTITIONER

## 2023-11-09 PROCEDURE — G0145 SCR C/V CYTO,THINLAYER,RESCR: HCPCS | Mod: ORL | Performed by: NURSE PRACTITIONER

## 2023-11-09 PROCEDURE — 87340 HEPATITIS B SURFACE AG IA: CPT | Mod: ORL | Performed by: NURSE PRACTITIONER

## 2023-11-09 PROCEDURE — 86708 HEPATITIS A ANTIBODY: CPT | Mod: ORL | Performed by: NURSE PRACTITIONER

## 2023-11-09 PROCEDURE — 80053 COMPREHEN METABOLIC PANEL: CPT | Mod: ORL | Performed by: NURSE PRACTITIONER

## 2023-11-09 PROCEDURE — 86706 HEP B SURFACE ANTIBODY: CPT | Mod: ORL | Performed by: NURSE PRACTITIONER

## 2023-11-09 PROCEDURE — 85025 COMPLETE CBC W/AUTO DIFF WBC: CPT | Mod: ORL | Performed by: NURSE PRACTITIONER

## 2023-11-09 PROCEDURE — 86481 TB AG RESPONSE T-CELL SUSP: CPT | Mod: ORL | Performed by: NURSE PRACTITIONER

## 2023-11-09 PROCEDURE — 80061 LIPID PANEL: CPT | Mod: ORL | Performed by: NURSE PRACTITIONER

## 2023-11-09 RX ORDER — CALCIUM CARB/VITAMIN D3/VIT K1 500-100-40
TABLET,CHEWABLE ORAL 2 TIMES DAILY
Qty: 120 G | Refills: 1 | Status: SHIPPED | OUTPATIENT
Start: 2023-11-09 | End: 2024-08-28

## 2023-11-09 RX ORDER — MIRTAZAPINE 15 MG/1
.5-1 TABLET, FILM COATED ORAL
COMMUNITY
Start: 2023-10-06 | End: 2024-08-28

## 2023-11-09 RX ORDER — CEPHALEXIN 500 MG/1
500 CAPSULE ORAL 3 TIMES DAILY
Qty: 30 CAPSULE | Refills: 0 | Status: SHIPPED | OUTPATIENT
Start: 2023-11-09 | End: 2024-08-28

## 2023-11-09 SDOH — ECONOMIC STABILITY - INCOME SECURITY: OTHER PROBLEMS RELATED TO HOUSING AND ECONOMIC CIRCUMSTANCES: Z59.89

## 2023-11-09 ASSESSMENT — ANXIETY QUESTIONNAIRES
5. BEING SO RESTLESS THAT IT IS HARD TO SIT STILL: NOT AT ALL
7. FEELING AFRAID AS IF SOMETHING AWFUL MIGHT HAPPEN: NOT AT ALL
6. BECOMING EASILY ANNOYED OR IRRITABLE: NOT AT ALL
2. NOT BEING ABLE TO STOP OR CONTROL WORRYING: NOT AT ALL
GAD7 TOTAL SCORE: 0
1. FEELING NERVOUS, ANXIOUS, OR ON EDGE: NOT AT ALL
GAD7 TOTAL SCORE: 0
3. WORRYING TOO MUCH ABOUT DIFFERENT THINGS: NOT AT ALL

## 2023-11-09 ASSESSMENT — PATIENT HEALTH QUESTIONNAIRE - PHQ9
5. POOR APPETITE OR OVEREATING: NOT AT ALL
SUM OF ALL RESPONSES TO PHQ QUESTIONS 1-9: 0

## 2023-11-09 NOTE — LETTER
November 10, 2023      Supriya Feng  [address] [address]        Dear ,    We are writing to inform you of your test results.    Your test results fall within the expected range(s) or remain unchanged from previous results.  Please continue with current treatment plan.    Resulted Orders   Hepatitis C Screen Reflex to HCV RNA Quant and Genotype   Result Value Ref Range    Hepatitis C Antibody Nonreactive Nonreactive    Narrative    Assay performance characteristics have not been established for newborns, infants, and children.   Hepatitis B surface antigen   Result Value Ref Range    Hepatitis B Surface Antigen Nonreactive Nonreactive   Hepatitis B Surface Antibody   Result Value Ref Range    Hepatitis B Surface Antibody Instrument Value 1.02 <8.00 m[IU]/mL    Hepatitis B Surface Antibody Nonreactive       Comment:      No antibody detected when the value is less than 8.00 mIU/mL.   Hepatitis A antibody IgM   Result Value Ref Range    Hepatitis A Antibody IgM Nonreactive Nonreactive      Comment:      IgM anti-HAV not detected. Does not exclude the possibility of recent exposure to or infection with HAV.   Hepatitis A Antibody IgG   Result Value Ref Range    Hepatitis A Antibody IgG Nonreactive Nonreactive    Narrative    This assay cannot be used for the diagnosis of acute HAV infection.   HCG qualitative urine   Result Value Ref Range    hCG Urine Qualitative Negative Negative      Comment:      This test is for screening purposes.  Results should be interpreted along with the clinical picture.  Confirmation testing is available if warranted by ordering [id number], HCG Quantitative Pregnancy.   HIV Antigen Antibody Combo Cascade   Result Value Ref Range    HIV Antigen Antibody Combo Nonreactive Nonreactive      Comment:      HIV-1 p24 Ag & HIV-1/HIV-2 Ab Not Detected   Comprehensive metabolic panel   Result Value Ref Range    Sodium 135 135 - 145 mmol/L      Comment:      Reference  intervals for this test were updated on 09/26/2023 to more accurately reflect our healthy population. There may be differences in the flagging of prior results with similar values performed with this method. Interpretation of those prior results can be made in the context of the updated reference intervals.     Potassium 4.6 3.4 - 5.3 mmol/L    Carbon Dioxide (CO2) 21 (L) 22 - 29 mmol/L    Anion Gap 11 7 - 15 mmol/L    Urea Nitrogen 15.9 6.0 - 20.0 mg/dL    Creatinine 0.60 0.51 - 0.95 mg/dL    GFR Estimate >90 >60 mL/min/1.73m2    Calcium 9.7 8.6 - 10.0 mg/dL    Chloride 103 98 - 107 mmol/L    Glucose 90 70 - 99 mg/dL    Alkaline Phosphatase 61 35 - 104 U/L    AST 23 0 - 45 U/L      Comment:      Reference intervals for this test were updated on 6/12/2023 to more accurately reflect our healthy population. There may be differences in the flagging of prior results with similar values performed with this method. Interpretation of those prior results can be made in the context of the updated reference intervals.    ALT 15 0 - 50 U/L      Comment:      Reference intervals for this test were updated on 6/12/2023 to more accurately reflect our healthy population. There may be differences in the flagging of prior results with similar values performed with this method. Interpretation of those prior results can be made in the context of the updated reference intervals.      Protein Total 7.2 6.4 - 8.3 g/dL    Albumin 4.0 3.5 - 5.2 g/dL    Bilirubin Total 0.3 <=1.2 mg/dL   Lipid panel reflex to direct LDL Fasting   Result Value Ref Range    Cholesterol 179 <200 mg/dL    Triglycerides 51 <150 mg/dL    Direct Measure HDL 87 >=50 mg/dL    LDL Cholesterol Calculated 82 <=100 mg/dL    Non HDL Cholesterol 92 <130 mg/dL    Narrative    Cholesterol  Desirable:  <200 mg/dL    Triglycerides  Normal:  Less than 150 mg/dL  Borderline High:  150-199 mg/dL  High:  200-499 mg/dL  Very High:  Greater than or equal to 500 mg/dL    Direct  Measure HDL  Female:  Greater than or equal to 50 mg/dL   Male:  Greater than or equal to 40 mg/dL    LDL Cholesterol  Desirable:  <100mg/dL  Above Desirable:  100-129 mg/dL   Borderline High:  130-159 mg/dL   High:  160-189 mg/dL   Very High:  >= 190 mg/dL    Non HDL Cholesterol  Desirable:  130 mg/dL  Above Desirable:  130-159 mg/dL  Borderline High:  160-189 mg/dL  High:  190-219 mg/dL  Very High:  Greater than or equal to 220 mg/dL   CBC with platelets and differential   Result Value Ref Range    WBC Count 5.6 4.0 - 11.0 10e3/uL    RBC Count 3.91 3.80 - 5.20 10e6/uL    Hemoglobin 12.4 11.7 - 15.7 g/dL    Hematocrit 38.7 35.0 - 47.0 %    MCV 99 78 - 100 fL    MCH 31.7 26.5 - 33.0 pg    MCHC 32.0 31.5 - 36.5 g/dL    RDW 13.2 10.0 - 15.0 %    Platelet Count 353 150 - 450 10e3/uL    % Neutrophils 46 %    % Lymphocytes 41 %    % Monocytes 10 %    % Eosinophils 2 %    % Basophils 1 %    % Immature Granulocytes 0 %    NRBCs per 100 WBC 0 <1 /100    Absolute Neutrophils 2.6 1.6 - 8.3 10e3/uL    Absolute Lymphocytes 2.3 0.8 - 5.3 10e3/uL    Absolute Monocytes 0.6 0.0 - 1.3 10e3/uL    Absolute Eosinophils 0.1 0.0 - 0.7 10e3/uL    Absolute Basophils 0.0 0.0 - 0.2 10e3/uL    Absolute Immature Granulocytes 0.0 <=0.4 10e3/uL    Absolute NRBCs 0.0 10e3/uL       If you have any questions or concerns, please call the clinic at the number listed above.       Sincerely,      Cathy Hill NP

## 2023-11-09 NOTE — NURSING NOTE
"ROOM:1  [facility]NALEE [facility]AS    Preferred Name: Supriya     How did you hear about us?  Other - [facility]    28 year old  Chief Complaint   Patient presents with     Physical     boil      First noticed a week and half ago   Located in inner thigh      Gyn Exam       Blood pressure 110/74, pulse 86, temperature 97.7  F (36.5  C), temperature source Oral, height 1.727 m (5' 8\"), weight 80.7 kg (178 lb), SpO2 97%. Body mass index is 27.06 kg/m .  BP completed using cuff size:        There is no problem list on file for this patient.      Wt Readings from Last 2 Encounters:   11/09/23 80.7 kg (178 lb)   10/05/23 71.2 kg (157 lb)     BP Readings from Last 3 Encounters:   11/09/23 110/74   10/05/23 105/69   09/28/23 97/66       No Known Allergies    Current Outpatient Medications   Medication     cloNIDine (CATAPRES) 0.1 MG tablet     gabapentin (NEURONTIN) 100 MG capsule     hydrOXYzine (ATARAX) 25 MG tablet     ibuprofen (ADVIL/MOTRIN) 600 MG tablet     mirtazapine (REMERON) 15 MG tablet     Multiple Vitamin (MULTIVITAMIN ADULT PO)     ondansetron (ZOFRAN) 4 MG tablet     vitamin D3 (CHOLECALCIFEROL) 50 mcg (2000 units) tablet     No current facility-administered medications for this visit.       Social History     Tobacco Use     Smoking status: Former     Types: Cigarettes     Smokeless tobacco: Never   Vaping Use     Vaping Use: Former   Substance Use Topics     Alcohol use: Not Currently     Comment: history     Drug use: Not Currently     Types: \"Crack\" cocaine, Marijuana       Honoring Choices - Health Care Directive Guide offered to patient at time of visit.    Health Maintenance Due   Topic Date Due     ADVANCE CARE PLANNING  Never done     DTAP/TDAP/TD IMMUNIZATION (6 - Tdap) 09/07/2006     YEARLY PREVENTIVE VISIT  04/13/2012     PAP  02/02/2015     INFLUENZA VACCINE (1) 09/01/2023     COVID-19 Vaccine (2 - 2023-24 season) 09/01/2023       Immunization History   Administered Date(s) Administered     COVID-19 Bivalent " 18+ (Moderna) 05/05/2023     DTAP (<7y) 1995, 1995, 1995, 02/22/1995, 02/23/1998     HIB (PRP-T) 1995, 1995, 1995, 02/22/1995     HPV 04/10/2008, 06/17/2008, 04/13/2011     HepB 1995, 1995, 1995     MMR 02/22/1995, 04/23/2002     Meningococcal (Menomune ) 09/13/2006     Poliovirus, inactivated (IPV) 1994, 1994, 1994, 04/23/2002     TD,PF 7+ (Tenivac) 09/06/2006     Varicella 02/27/1996, 09/13/2006       Lab Results   Component Value Date    PAP NIL 04/13/2011       No lab results found.        11/9/2023     7:48 AM 6/13/2023     2:58 PM   PHQ-2 ( 1999 Pfizer)   Q1: Little interest or pleasure in doing things 0 3   Q2: Feeling down, depressed or hopeless 0 1   PHQ-2 Score 0 4           6/13/2023     2:58 PM 11/9/2023     7:48 AM   PHQ-9 SCORE   PHQ-9 Total Score 4 0           6/13/2023     2:58 PM 11/9/2023     7:48 AM   KRUNAL-7 SCORE   Total Score 0 0            No data to display                Deangelo Rock    November 9, 2023 7:56 AM

## 2023-11-09 NOTE — LETTER
November 22, 2023      Supriya Feng  [address] [address]        Dear ,    We are writing to inform you of your test results.    Your test results fall within the expected range(s) or remain unchanged from previous results.  Please continue with current treatment plan.    Resulted Orders   Hepatitis C Screen Reflex to HCV RNA Quant and Genotype   Result Value Ref Range    Hepatitis C Antibody Nonreactive Nonreactive    Narrative    Assay performance characteristics have not been established for newborns, infants, and children.   Hepatitis B surface antigen   Result Value Ref Range    Hepatitis B Surface Antigen Nonreactive Nonreactive   Hepatitis B Surface Antibody   Result Value Ref Range    Hepatitis B Surface Antibody Instrument Value 1.02 <8.00 m[IU]/mL    Hepatitis B Surface Antibody Nonreactive       Comment:      No antibody detected when the value is less than 8.00 mIU/mL.   Hepatitis A antibody IgM   Result Value Ref Range    Hepatitis A Antibody IgM Nonreactive Nonreactive      Comment:      IgM anti-HAV not detected. Does not exclude the possibility of recent exposure to or infection with HAV.   Hepatitis A Antibody IgG   Result Value Ref Range    Hepatitis A Antibody IgG Nonreactive Nonreactive    Narrative    This assay cannot be used for the diagnosis of acute HAV infection.   HCG qualitative urine   Result Value Ref Range    hCG Urine Qualitative Negative Negative      Comment:      This test is for screening purposes.  Results should be interpreted along with the clinical picture.  Confirmation testing is available if warranted by ordering [id number], HCG Quantitative Pregnancy.   HIV Antigen Antibody Combo Cascade   Result Value Ref Range    HIV Antigen Antibody Combo Nonreactive Nonreactive      Comment:      HIV-1 p24 Ag & HIV-1/HIV-2 Ab Not Detected   Comprehensive metabolic panel   Result Value Ref Range    Sodium 135 135 - 145 mmol/L      Comment:      Reference  intervals for this test were updated on 09/26/2023 to more accurately reflect our healthy population. There may be differences in the flagging of prior results with similar values performed with this method. Interpretation of those prior results can be made in the context of the updated reference intervals.     Potassium 4.6 3.4 - 5.3 mmol/L    Carbon Dioxide (CO2) 21 (L) 22 - 29 mmol/L    Anion Gap 11 7 - 15 mmol/L    Urea Nitrogen 15.9 6.0 - 20.0 mg/dL    Creatinine 0.60 0.51 - 0.95 mg/dL    GFR Estimate >90 >60 mL/min/1.73m2    Calcium 9.7 8.6 - 10.0 mg/dL    Chloride 103 98 - 107 mmol/L    Glucose 90 70 - 99 mg/dL    Alkaline Phosphatase 61 35 - 104 U/L    AST 23 0 - 45 U/L      Comment:      Reference intervals for this test were updated on 6/12/2023 to more accurately reflect our healthy population. There may be differences in the flagging of prior results with similar values performed with this method. Interpretation of those prior results can be made in the context of the updated reference intervals.    ALT 15 0 - 50 U/L      Comment:      Reference intervals for this test were updated on 6/12/2023 to more accurately reflect our healthy population. There may be differences in the flagging of prior results with similar values performed with this method. Interpretation of those prior results can be made in the context of the updated reference intervals.      Protein Total 7.2 6.4 - 8.3 g/dL    Albumin 4.0 3.5 - 5.2 g/dL    Bilirubin Total 0.3 <=1.2 mg/dL   Lipid panel reflex to direct LDL Fasting   Result Value Ref Range    Cholesterol 179 <200 mg/dL    Triglycerides 51 <150 mg/dL    Direct Measure HDL 87 >=50 mg/dL    LDL Cholesterol Calculated 82 <=100 mg/dL    Non HDL Cholesterol 92 <130 mg/dL    Narrative    Cholesterol  Desirable:  <200 mg/dL    Triglycerides  Normal:  Less than 150 mg/dL  Borderline High:  150-199 mg/dL  High:  200-499 mg/dL  Very High:  Greater than or equal to 500 mg/dL    Direct  Measure HDL  Female:  Greater than or equal to 50 mg/dL   Male:  Greater than or equal to 40 mg/dL    LDL Cholesterol  Desirable:  <100mg/dL  Above Desirable:  100-129 mg/dL   Borderline High:  130-159 mg/dL   High:  160-189 mg/dL   Very High:  >= 190 mg/dL    Non HDL Cholesterol  Desirable:  130 mg/dL  Above Desirable:  130-159 mg/dL  Borderline High:  160-189 mg/dL  High:  190-219 mg/dL  Very High:  Greater than or equal to 220 mg/dL   Quantiferon TB Gold Plus Grey Tube   Result Value Ref Range    Quantiferon Nil Tube 0.00 IU/mL   Quantiferon TB Gold Plus Green Tube   Result Value Ref Range    Quantiferon TB1 Tube 0.09 IU/mL   Quantiferon TB Gold Plus Yellow Tube   Result Value Ref Range    Quantiferon TB2 Tube 0.05    Quantiferon TB Gold Plus Purple Tube   Result Value Ref Range    Quantiferon Mitogen 5.49 IU/mL   CBC with platelets and differential   Result Value Ref Range    WBC Count 5.6 4.0 - 11.0 10e3/uL    RBC Count 3.91 3.80 - 5.20 10e6/uL    Hemoglobin 12.4 11.7 - 15.7 g/dL    Hematocrit 38.7 35.0 - 47.0 %    MCV 99 78 - 100 fL    MCH 31.7 26.5 - 33.0 pg    MCHC 32.0 31.5 - 36.5 g/dL    RDW 13.2 10.0 - 15.0 %    Platelet Count 353 150 - 450 10e3/uL    % Neutrophils 46 %    % Lymphocytes 41 %    % Monocytes 10 %    % Eosinophils 2 %    % Basophils 1 %    % Immature Granulocytes 0 %    NRBCs per 100 WBC 0 <1 /100    Absolute Neutrophils 2.6 1.6 - 8.3 10e3/uL    Absolute Lymphocytes 2.3 0.8 - 5.3 10e3/uL    Absolute Monocytes 0.6 0.0 - 1.3 10e3/uL    Absolute Eosinophils 0.1 0.0 - 0.7 10e3/uL    Absolute Basophils 0.0 0.0 - 0.2 10e3/uL    Absolute Immature Granulocytes 0.0 <=0.4 10e3/uL    Absolute NRBCs 0.0 10e3/uL   Pap screen reflex to HPV if ASCUS - recommend age 25 - 29   Result Value Ref Range    Interpretation        Negative for Intraepithelial Lesion or Malignancy (NILM)    Comment         Papanicolaou Test Limitations:  Cervical cytology is a screening test with limited sensitivity, and regular  screening is critical for cancer prevention.  Pap tests are primarily effective for the diagnosis/prevention of squamous cell carcinoma, not adenocarcinoma or other cancers.        Specimen Adequacy       Satisfactory for evaluation, endocervical/transformation zone component absent    Clinical Information       none      Reflex Testing Yes regardless of result     Previous Abnormal?       No      Performing Labs       The technical component of this testing was completed at [facility]     Quantiferon TB Gold Plus   Result Value Ref Range    Quantiferon-TB Gold Plus Negative Negative      Comment:      No interferon gamma response to M.tuberculosis antigens was detected. Infection with M.tuberculosis is unlikely, however a single negative result does not exclude infection. In patients at high risk for infection, a second test should be considered in accordance with the 2017 ATS/IDSA/CDC Clinical Pract  ice Guidelines for Diagnosis of Tuberculosis in Adults and Children     TB1 Ag minus Nil Value 0.09 IU/mL    TB2 Ag minus Nil Value 0.05 IU/mL    Mitogen minus Nil Result 5.49 IU/mL    Nil Result 0.00 IU/mL   HPV High Risk Types DNA Cervical   Result Value Ref Range    Other HR HPV Negative Negative    HPV16 DNA Negative Negative    HPV18 DNA Negative Negative    FINAL DIAGNOSIS       This patient's sample is negative for HPV DNA.        This test was developed and its performance characteristics determined by the [facility]. It has not been cleared or approved by the FDA. The laboratory is regulated under CLIA as qualified to perform high-complexity testing. This test is used for clinical purposes. It should not be regarded as investigational or for research.    METHODOLOGY: The Roche Christian 4800 system uses automated extraction, simultaneous amplification of HPV (L1 region) and beta-globin,  followed by real time detection of fluorescent labeled HPV and beta globin using specific oligonucleotide probes. The test specifically identifies types HPV 16 DNA and HPV 18 DNA while concurrently detecting the rest of the high risk types (31, 33, 35, 39, 45, 51, 52, 56, 58, 59, 66 or 68).    COMMENTS: This test is not intended for use as a screening device for woman under age 30 with normal cervical cytology. Results should be correlated with cytologic and histologic findings. Close clinical followup is recommended.           If you have any questions or concerns, please call the clinic at the number listed above.       Sincerely,      Cathy Hill NP

## 2023-11-09 NOTE — PROGRESS NOTES
"[facility]AM[facility]    Patient: Supriya Feng    YOB: 1995  Sex:  female    Date of Exam: 11/09/23  MyChart Status: Activated    Arrival Time: 07 45 AM  Departure Time: 08 30 AM    Charge Phone (written on paperwork): [phone]    Vitals: /74   Pulse 86   Temp 97.7  F (36.5  C) (Oral)   Ht 1.727 m (5' 8\")   Wt 80.7 kg (178 lb)   SpO2 97%   BMI 27.06 kg/m      Patient Concerns:   Chief Complaint   Patient presents with     Physical     boil      First noticed a week and half ago   Located in inner thigh      Gyn Exam         PHQ-2 Score:        11/9/2023     7:48 AM 6/13/2023     2:58 PM   PHQ-2 ( 1999 [employer])   Q1: Little interest or pleasure in doing things 0 3   Q2: Feeling down, depressed or hopeless 0 1   PHQ-2 Score 0 4      PHQ-9 score:        11/9/2023     7:48 AM   PHQ   PHQ-9 Total Score 0   Q9: Thoughts of better off dead/self-harm past 2 weeks Not at all       Medications:   Current Outpatient Medications   Medication Sig Dispense Refill     cloNIDine (CATAPRES) 0.1 MG tablet Take 0.1 mg by mouth 3 times daily as needed *Hold for dizziness or lightheadedness*       gabapentin (NEURONTIN) 100 MG capsule Take 1-2 capsules by mouth 3 times daily as needed       hydrOXYzine (ATARAX) 25 MG tablet Take 25 mg by mouth 3 times daily as needed       ibuprofen (ADVIL/MOTRIN) 600 MG tablet 600 mg 3 times daily as needed       mirtazapine (REMERON) 15 MG tablet Take 0.5-1 tablets by mouth nightly as needed       Multiple Vitamin (MULTIVITAMIN ADULT PO) Take 1 tablet by mouth daily       ondansetron (ZOFRAN) 4 MG tablet Take 8 mg by mouth every 8 hours as needed       vitamin D3 (CHOLECALCIFEROL) 50 mcg (2000 units) tablet Take 1 tablet by mouth daily       Supriya is a 28 year old female who comes in for a physical and pap smear. She would also like her labs drawn since they were unable to obtain them last time. She denies being sexually active " while being at [employer] and did not want to discuss drug of choice or any history. Her main complaint is a boil on her left inner thigh. She noted she previously had another one in that general area, this is a different one, and was treated with antibiotics which helped. She also notes that she is prone to calluses on her feet. She has treated them with lotions and with razoring off the callouses, they return.    ROS:   ROS: 10 point ROS neg other than the symptoms noted above in the HPI.    General Physical Exam:  Physical Exam  Constitutional:       Appearance: Normal appearance.      Comments: Positive for 9.5 kg weight gain since 10/05/2023   HENT:      Head: Normocephalic.      Right Ear: Tympanic membrane, ear canal and external ear normal.      Left Ear: Tympanic membrane, ear canal and external ear normal.      Nose: Nose normal.      Mouth/Throat:      Mouth: Mucous membranes are moist.      Pharynx: Oropharynx is clear.   Eyes:      Pupils: Pupils are equal, round, and reactive to light.   Cardiovascular:      Rate and Rhythm: Normal rate and regular rhythm.      Heart sounds: Normal heart sounds.   Pulmonary:      Breath sounds: Normal breath sounds.   Chest:   Breasts:     Right: Normal.      Left: Normal.   Abdominal:      General: Bowel sounds are normal.      Palpations: Abdomen is soft.      Tenderness: There is no abdominal tenderness.      Comments: Striae present   Genitourinary:     General: Normal vulva.      Exam position: Lithotomy position.      Vagina: Normal.      Cervix: Normal.      Uterus: Normal.    Musculoskeletal:         General: Normal range of motion.      Cervical back: Neck supple.   Feet:      Right foot:      Skin integrity: Callus and dry skin present.      Left foot:      Skin integrity: Callus and dry skin present.   Lymphadenopathy:      Cervical: No cervical adenopathy.      Upper Body:      Right upper body: No axillary adenopathy.      Left upper body: No axillary  adenopathy.   Skin:     General: Skin is warm and dry.      Comments: Subdermal raised boil on left inner thigh about 1 cm in size with white drainage present. Crust surrounding boil. Painful with palpation. Scars present along left inner forearm region from history of self-inflicted cutting when she was younger.    Neurological:      Mental Status: She is alert and oriented to person, place, and time.   Psychiatric:         Mood and Affect: Mood normal.         Behavior: Behavior normal.         Thought Content: Thought content normal.       All labs required for [facility] will be completed during this visit: HIV, Hepatitis A (IgM &IgG), Hepatitis B (IgM &IgG), Hepatitis C, Quantiferon Gold, Pregnancy Test     Allergies: No Known Allergies    Are there any conditions that may endanger the health of the staff or Clients in our residential program? No     Is there any reason why this applicant should not assist in the preparation of food? No    This applicant is okay to admit for services to [facility]? Yes     The above client is a mutual patient at [facility], and the [facility] Practitioners [facility] would like our patient to continue taking her medication as prescribed upon discharging from [facility].     I authorize the above patient to take all of her medication with her upon discharging from [facility]. Yes     Diagnoses:   (Z00.00) Routine history and physical examination of adult  (primary encounter diagnosis)  Plan: HCG qualitative urine, CBC with platelets         differential, Comprehensive metabolic panel,         Lipid panel reflex to direct LDL Fasting, Pap         screen reflex to HPV if ASCUS - recommend age         25 - 29    (F19.10) Substance abuse (H)  Plan: Hepatitis C Screen Reflex to HCV RNA Quant and         Genotype, Hepatitis B surface antigen,         Hepatitis B Surface  Antibody, Hepatitis A         antibody IgM, Hepatitis A Antibody IgG, HIV         Antigen Antibody Combo Cascade      (Z59.89) Living accommodation issues  Plan: Quantiferon-TB Gold Plus    (L02.92) Boil  Plan: cephALEXin (KEFLEX) 500 MG capsule       (B35.3) Tinea pedis of both feet  Plan: tolnaftate (LAMISIL) 1 % external spray   Educated the importance of changing socks and keeping feet clean and dry.         Medication Changes: MEDICATIONS:        - Start taking Keflex 500 mg BID for 10 days and Lamisil BID.  Continue all other medications without change.     Referrals   NO REFERRALS MADE TODAY    Follow up plan   Follow up as needed    Julia Uriarte, AMBIKA Student  Cathy Hill, [place]   I, Cathy Hill DNP, APRN, FNP, ANP, reviewed and verified the nurse practitioner (NP)  student's documentation of the patient's history.  I performed the exam and medical decision making activities with the NP student, who was present for learning purposes.      Fax completed forms to: [phone]

## 2023-11-10 LAB
ALBUMIN SERPL BCG-MCNC: 4 G/DL (ref 3.5–5.2)
ALP SERPL-CCNC: 61 U/L (ref 35–104)
ALT SERPL W P-5'-P-CCNC: 15 U/L (ref 0–50)
ANION GAP SERPL CALCULATED.3IONS-SCNC: 11 MMOL/L (ref 7–15)
AST SERPL W P-5'-P-CCNC: 23 U/L (ref 0–45)
BILIRUB SERPL-MCNC: 0.3 MG/DL
BUN SERPL-MCNC: 15.9 MG/DL (ref 6–20)
CALCIUM SERPL-MCNC: 9.7 MG/DL (ref 8.6–10)
CHLORIDE SERPL-SCNC: 103 MMOL/L (ref 98–107)
CHOLEST SERPL-MCNC: 179 MG/DL
CREAT SERPL-MCNC: 0.6 MG/DL (ref 0.51–0.95)
DEPRECATED HCO3 PLAS-SCNC: 21 MMOL/L (ref 22–29)
EGFRCR SERPLBLD CKD-EPI 2021: >90 ML/MIN/1.73M2
GLUCOSE SERPL-MCNC: 90 MG/DL (ref 70–99)
HAV IGG SER QL IA: NONREACTIVE
HAV IGM SERPL QL IA: NONREACTIVE
HBV SURFACE AB SERPL IA-ACNC: 1.02 M[IU]/ML
HBV SURFACE AB SERPL IA-ACNC: NONREACTIVE M[IU]/ML
HBV SURFACE AG SERPL QL IA: NONREACTIVE
HCV AB SERPL QL IA: NONREACTIVE
HDLC SERPL-MCNC: 87 MG/DL
HIV 1+2 AB+HIV1 P24 AG SERPL QL IA: NONREACTIVE
LDLC SERPL CALC-MCNC: 82 MG/DL
NONHDLC SERPL-MCNC: 92 MG/DL
POTASSIUM SERPL-SCNC: 4.6 MMOL/L (ref 3.4–5.3)
PROT SERPL-MCNC: 7.2 G/DL (ref 6.4–8.3)
SODIUM SERPL-SCNC: 135 MMOL/L (ref 135–145)
TRIGL SERPL-MCNC: 51 MG/DL

## 2023-11-12 LAB
GAMMA INTERFERON BACKGROUND BLD IA-ACNC: 0 IU/ML
M TB IFN-G BLD-IMP: NEGATIVE
M TB IFN-G CD4+ BCKGRND COR BLD-ACNC: 5.49 IU/ML
MITOGEN IGNF BCKGRD COR BLD-ACNC: 0.05 IU/ML
MITOGEN IGNF BCKGRD COR BLD-ACNC: 0.09 IU/ML
QUANTIFERON MITOGEN: 5.49 IU/ML
QUANTIFERON NIL TUBE: 0 IU/ML
QUANTIFERON TB1 TUBE: 0.09 IU/ML
QUANTIFERON TB2 TUBE: 0.05

## 2023-11-14 LAB
BKR LAB AP GYN ADEQUACY: NORMAL
BKR LAB AP GYN INTERPRETATION: NORMAL
BKR LAB AP HPV REFLEX: NORMAL
BKR LAB AP PREVIOUS ABNORMAL: NORMAL
PATH REPORT.COMMENTS IMP SPEC: NORMAL
PATH REPORT.COMMENTS IMP SPEC: NORMAL
PATH REPORT.RELEVANT HX SPEC: NORMAL

## 2023-11-16 LAB
HUMAN PAPILLOMA VIRUS 16 DNA: NEGATIVE
HUMAN PAPILLOMA VIRUS 18 DNA: NEGATIVE
HUMAN PAPILLOMA VIRUS FINAL DIAGNOSIS: NORMAL
HUMAN PAPILLOMA VIRUS OTHER HR: NEGATIVE

## 2024-08-21 ENCOUNTER — NURSE TRIAGE (OUTPATIENT)
Dept: NURSING | Facility: CLINIC | Age: 29
End: 2024-08-21
Payer: COMMERCIAL

## 2024-08-21 NOTE — TELEPHONE ENCOUNTER
"Patient is 8 weeks pregnant and first appointment is on August 27 th.  Patient states for the past couple of days has been nauseated.  Patient is not able to eat or keep anything down.  Patient has vomited 3 times today.  Patient feels that she is dehydrated as she cannot keep chicken noodle soup or anything down.  Patient will go to ER.      OB Triage Call      Is patient's OB/Midwife with the [facility]? Non- [facility] OB provider     Reason for call: Nausea/vomiting/dehydration    Assessment: Patient crying on the phone due to nausea and vomiting.    Plan: Go to ER.    Patient plans to deliver at  None yet    Patient's primary OB Provider is Not seen yet.      Patient encouraged to consult primary OB provider office (not affiliated with [facility]) with additional questions or concerns and if planning to present to hospital for evaluation.           Unknown    Estimated Date of Delivery: Data Unavailable    No obstetric history on file.    OB History   Obstetric Comments   1 pregnancy       No results found for: \"GBS\"       Nehal Dominguez RN   Reason for Disposition   [1] Drinking very little AND [2] dehydration suspected (e.g., no urine > 12 hours, very dry mouth, very lightheaded)    Additional Information   Negative: Sounds like a life-threatening emergency to the triager   Negative: [1] Vaginal bleeding AND [2] pregnant < 20 weeks   Negative: [1] Abdominal pain AND [2] pregnant < 20 weeks   Negative: [1] Vomiting AND [2] pregnant 20 or more weeks   Negative: Headache is main symptom   Negative: [1] Vomiting AND [2] contains red blood or black (\"coffee ground\") material  (Exception: Few red streaks in vomit that only happened once.)   Negative: [1] Insulin-dependent diabetes (Type I) AND [2] glucose > 400 mg/dl (22 mmol/l)   Negative: Recent head injury (within last 3 days)   Negative: Recent abdominal injury (within last 3 days)   Negative: Severe pain in one eye   Negative: " [1] SEVERE vomiting (e.g., 6 or more times / day) AND [2] present > 8 hours    Protocols used: Pregnancy - Morning Sickness (Nausea and Vomiting of Pregnancy)-A-AH

## 2024-08-27 ENCOUNTER — TELEPHONE (OUTPATIENT)
Dept: OBGYN | Facility: CLINIC | Age: 29
End: 2024-08-27
Payer: COMMERCIAL

## 2024-08-27 ENCOUNTER — ANCILLARY PROCEDURE (OUTPATIENT)
Dept: ULTRASOUND IMAGING | Facility: CLINIC | Age: 29
End: 2024-08-27
Attending: ADVANCED PRACTICE MIDWIFE
Payer: COMMERCIAL

## 2024-08-27 DIAGNOSIS — Z32.01 PREGNANCY TEST POSITIVE: ICD-10-CM

## 2024-08-27 DIAGNOSIS — Z32.01 PREGNANCY TEST POSITIVE: Primary | ICD-10-CM

## 2024-08-27 PROCEDURE — 76801 OB US < 14 WKS SINGLE FETUS: CPT | Mod: 26 | Performed by: OBSTETRICS & GYNECOLOGY

## 2024-08-27 PROCEDURE — 76801 OB US < 14 WKS SINGLE FETUS: CPT

## 2024-08-27 NOTE — PATIENT INSTRUCTIONS
Thank you for trusting us with your care!   Please be aware, if you are on Mychart, you may see your results prior to your providers review. If labs are abnormal, we will call or message you on Mychart with a follow up plan.    If you need to contact us for questions about:  Symptoms, Scheduling & Medical Questions; Non-urgent (2-3 day response) Mychart message, Urgent (needing response today) [phone] (if after 3:30pm next day response)   Prescriptions: Please call your Pharmacy   Billing: Alexi [phone] or [facility]:[phone]     WIC is a nutrition and breastfeeding program that helps young families eat well and be healthy by assisting with obtaining food and teaching about nutritious foods during/after pregnancy.  Here's where you can find more information about WIC and apply if interested: [email or web address]   [employer] provides education during all three trimesters of pregnancy, reviewing the stages of prenatal development so parents know what to expect and feel empowered in their journey. They also continue support after birth, with [employer] Educ[employer] modeling techniques for playing and learning. Here's the link: [email or web address]     Congratulations on your pregnancy! Here is a detailed list of helpful information related to prenatal appointments, pregnancy care, medications in pregnancy and more.       PRENATAL CARE INSTRUCTIONS        APPOINTMENTS:  8-28 weeks every 4 weeks  28-36 weeks every 2 weeks  36-Delivery every week    ACTIVITY LEVEL:  Exercising is safe in pregnancy. If you currently have an exercise routine it is typically okay to continue. As your pregnancy progresses you may need to modify your workout to accommodate your changing body.  Hot tubs, tanning beds, whirlpools, saunas, contact sports, hot/heated yoga should be avoided.  Stay well hydrated!    Travel:  It is safe to travel during pregnancy up until 34 weeks unless  directed otherwise by your provider. If you are going on a longer trip get up and walk every two hours, this helps decrease the risk of blood clots in your legs. Stay well hydrated and bring snacks.  For Zika information visit the CDC website    DIET:  Eat a balanced diet. Visit www.ChooseMyPlate.gov for more information.  Eat foods that are completely cooked, clean and pasteurized. Visit the Food and Drug Administration (FDA) website for more information regarding food safety and Listeria.  No caffeine is preferred, however one caffeinated beverage (about 200 mg a day) is acceptable.  Consume approximately 300 extra calories per day in pregnancy.  Take a daily prenatal vitamin with DHA, any brand is fine. If your vitamin does not have DHA you can take a separate Omega-3 supplement that has DHA.    ULTRASOUNDS AND TESTS:  You will have an 18-20 week ultrasound to check the growth and anatomy of your baby. Additional ultrasounds will be done if medically indicated.     DENTAL CARE:  Do not neglect your dental care during pregnancy. Regular brushing, flossing, dental exams and cleanings are important. Routine x-rays can be postponed until after pregnancy. If a diagnostic x-ray is needed a lead apron should be worn over your abdomen for baby's protection.  Dental work can be done during pregnancy if needed. Novocain/Lidocaine are safe in pregnancy.    DAILY ACTIVITIES:  It is safe to color your hair just do it in a well ventilated area.  Painting is safe as well, again be in a well ventilated area.  If you have a cat do not clean the litter box during pregnancy. Toxoplasmosis can be contracted through cat feces.    FLU VACCINE AND TDAP:  The flu vaccine is safe and recommended in pregnancy.   The Tdap vaccination is recommended at 28 weeks of pregnancy. This helps protect your baby from whooping cough. It is recommended that anyone coming into close contact with babies should be up to date with the Tdap vaccination.  For more information go to CDC.gov/pertussis/pregnant    BODY CHANGES:   Your body will go through many changes during pregnancy. Your book has good information regarding these changes.    CONTACT THE CLINIC IF YOU HAVE:  Temperature of 100.4 or above  Irritating vaginal discharge (increase in non-irritating discharge is normal)  Vaginal bleeding/severe pain  Bladder infection (burning with urination, frequent urination, blood in urine)  Headaches not relieved with rest, hydration, and/or Tylenol  Decrease in baby's activity. Kick counts <28 weeks   Any accident resulting in trauma (injury) to you-especially abdominal trauma  Symptoms of pre-term labor     If you are active on Pay[employer] expect to receive results and communication from your provider on your Meitut. For [employer]ma assistance call the help line [phone]. [employer] messages are only answered during clinic hours. If you have a concern you can always call the clinic [phone], even after hours we have a nurse line and a provider on call.        Genetic, Chromosomal Screening and Testing Options     NIPS is non-invasive genetic screening. This can be done anytime after 10 weeks of pregnancy-it is a blood test with results available approximately 5-7 business days. The company that processes this test is called [employer]. There are multiple billing options-call [phone] with any questions. [employer] also has further information on their website [email or web address]     Let your provider know if you would like to find out the gender of your baby, this can be added to the testing with no additional charge.     ----------------------------------------------------------------------------------------------------------------------------     Before 14 weeks: Dating ultrasound and genetic testing-optional-(NIPS-Non Invasive Prenatal Screening through 3rd party, [email or web address])     16 weeks: Optional genetic testing AFP (alpha fetoprotein) this testing helps understand  your baby's risk for some genetic abnormalities such as neural tube defects     18-22 weeks: Screening anatomy ultrasound-this looks for early growth abnormalities, placenta location, and may tell the baby's gender     24-27 weeks: One hour diabetes test (GCT) and complete blood count. This helps identify diabetes of pregnancy or gestational diabetes. We look at the iron in your blood and how well your blood clots.     28-36 weeks: Tetanus shot (Tdap) this shot helps protect you and your baby from whooping cough.     36 weeks and later: Group B Step test (GBS) this test helps predict if you need antibiotics in labor to prevent infection for your baby.     Anytime in September through April: Flu shot-this is important to get in pregnancy  ---------------------------------------------------------------------------------------  Taking Medicine During Pregnancy  Treating Common Problems     The Common Cold  Rest and drink plenty of fluids. A vaporizer may help.  For aches and fever   -acetaminophen (Tylenol)     For sore throat   Gargle with warm salt water. Suck on hard candy, ice or Popsicles. Eat soft, soothing foods. Drink cool or warm liquids. You may also take:  -cough drops (throat lozenges), such as Halls, Ricola, Cepacol or Chloraseptic.   -acetaminophen (Tylenol)     For cough   Avoid products that contain alcohol. You may take:  -cough drops (throat lozenges), such as Halls, Ricola or Cepacol.   -guaifenesin (Mucinex, plain Robitussin) for a dry cough.   -dextromethorphan (plain Robitussin, Delsym) to suppress a cough     For nasal congestion (stuffy head)  You may take:  -saline nasal spray  -Afrin nasal spray. Do not use this for more than 3 or 4 days.  -pseudoephedrine (plain Sudafed). Use with caution and only after consulting your care provider. Do not use this if you have high blood pressure.     Allergies (such as runny nose or sneezing)  -diphenhydramine (plain Benadryl)   -chlorpheniramine  (Chlor-Trimeton)  -second generation antihistamines such as Claritin (loratadine) or Zyrtec (cetirizine)     Flu (influenza) prevention  Pregnant women should be vaccinated early in the flu season (October through May) as soon as the vaccine is available regardless how far along you are in your pregnancy. (Do not use the FluMist nasal inhalation.)  Headaches, pain and inflammation  Do not take ibuprofen (Advil or Motrin), naproxen sodium (Aleve), aspirin or salicylates without first speaking with your doctor. These may not be safe during all stages of pregnancy.   Instead, you may use:  -acetaminophen (Tylenol)  If Tylenol doesn't help your headache, call your care provider. This could be a sign of high blood pressure.      Constipation (hard, dry stools that are difficult to pass)   Eat more fiber such as whole grains, fruits and vegetables. Drink 10 to 12 glasses of fluids each day (no caffeine). You may also take:  docusate sodium (Colace) to soften your stools. This takes a day or two to have an effect.  -Metamucil (plain), Effersyllium, Citrucel or Fibercon to increase the fiber in your diet. This takes a day or two to have an effect.  -Miralax. This may take a day or two to have an effect  -senna (Senokot) or bisacodyl (Dulcolax). This will produce a bowel movement soon after use. Do not use it regularly.  -glycerin suppository. This will produce a bowel movement soon after use.   -Milk of Magnesia, for easier bowel movements. This works overnight. Do not use it regularly.      Hemorrhoids   Soak in a tub of warm (not hot) water. Use either an ice pack or a cloth soaked in witch hazel on the area. You might also try:  -Anusol, Anusol HC, Preparation H, Tucks, Americaine, benzocaine, Tronolane     To help prevent hemorrhoids, avoid straining if you are constipated.      Diarrhea (loose, watery stools)  Drink plenty of fluids (no caffeine). You may also take:  -loperamide (Imodium AD)   If diarrhea lasts for  "more than 24 hours, or if you have contractions, call your care provider.     Heartburn or indigestion  Eat small meals often. Avoid food within two hours of bedtime. Raise your head up with an extra pillow. Try not to wear tight clothing around your waist. Avoid caffeine, chocolate, fat, alcohol, tea, coffee, soft drinks, citrus juices and large meals. You may also take:  -Mylanta, Maalox  -calcium carbonate (Tums), unless you have a history of kidney stones  -famotidine (Pepcid AC) or cimetidine (Tagamet)  -esomeprazole (Nexium), lansoprazole (Prevacid) or omeprazole (Prilosec), only after consulting with your care provider  -simethicone (Mylicon) for gas pains after surgery      Heartburn could be a sign of a common but sometimes serious problem (called pre-eclampsia). If these treatments don't help, call your care provider.        Nausea or vomiting (feeling sick to the stomach or throwing up)  Eat small meals often. Drink plenty of fluids (no caffeine). Eat bland foods such as crackers, dry toast, rice or pasta without sauce. Try not to let yourself get too hungry.  Note that prenatal vitamins can cause nausea and vomiting. To decrease nausea and vomiting, always take them with food. It may help to take them in the evening. Ask your doctor about a chewable vitamin, or you can have two children's vitamins (such as Flintstones). You can also try:  -deepa root tea  -Sea Bands (acupressure wristbands used for sea sickness, carried by many pharmacies)     If these treatments don't help, ask your provider about:  -vitamin B6 (pyridoxine). Take 10 to 25 mg every 8 hours.  -doxylamine (Unisom tablets--not \"sleep gels\").Take 25 mg at bedtime and 12.5 mg in the morning and afternoon.  -calcium carbonate (Tums), unless you have a history of kidney stones  -meclizine (Bonine, Dramamine II)     If you cannot keep any fluids down without vomiting for over 24 hours, call your care provider. You may need to come to the " hospital for IV fluids and medicines.   Nausea and vomiting late in pregnancy could be a sign of a common but sometimes serious problem (called pre-eclampsia). If none of these treatments help, call your care provider.     Yeast infections  If you have a yeast infection, you may try:  -Monistat, Gyne-Lotrimin (7-day treatment course)     If these treatments don't help, or if you have a fever or other symptoms, call your care provider. You may have a more serious infection that needs a different treatment.  To prevent yeast infections:   -Avoid bubble baths, douches, feminine hygiene sprays, deodorant tampons and sanitary pads, and colored or perfumed toilet paper.   -Don't wear tight-fitting or synthetic-fiber clothes.  -Wear cotton undergarments.  -Dry your genital area after you bathe or shower and before getting dressed. You might try using a blow dryer on a low, cool setting.  -Wipe from front to back after using the toilet.   -Change out of wet swimsuits or other damp clothes as soon as you can.        Learning About Pregnancy  Your Care Instructions     Your health in the early weeks of your pregnancy is particularly important for your baby's health. Take good care of yourself. Anything you do that harms your body can also harm your baby.  Make sure to go to all of your doctor appointments. Regular checkups will help keep you and your baby healthy.  How can you care for yourself at home?  Diet    Choose healthy foods like fruits, vegetables, whole grains, lean proteins, and healthy fats.     Choose foods that are good sources of calcium, iron, and folate. You can try dairy products, dark leafy greens, fortified orange juice and cereals, almonds, broccoli, dried fruit, and beans.     Do not skip meals or go for many hours without eating. If you are nauseated, try to eat a small, healthy snack every 2 to 3 hours.     Avoid fish that are high in mercury. These include shark, swordfish, raysa mackerel, marlin,  orange roughy, and bigeye tuna, as well as tilefish from the [facility].     It's okay to eat up to 8 to 12 ounces a week of fish that are low in mercury or up to 4 ounces a week of fish that have medium levels of mercury. Some fish that are low in mercury are salmon, shrimp, canned light tuna, cod, and tilapia. Some fish that have medium levels of mercury are halibut and white albacore tuna.     Drink plenty of fluids. If you have kidney, heart, or liver disease and have to limit fluids, talk with your doctor before you increase the amount of fluids you drink.     Limit caffeine to about 200 to 300 mg per day. On average, a cup of brewed coffee has around 80 to 100 mg of caffeine.     Do not drink alcohol, such as beer, wine, or hard liquor.     Take a multivitamin that contains at least 400 micrograms (mcg) of folic acid to help prevent birth defects. Fortified cereal and whole wheat bread are good additional sources of folic acid.     Increase the calcium in your diet. Try to drink a quart of skim milk each day. You may also take calcium supplements and choose foods such as cheese and yogurt.   Lifestyle    Make sure you go to your follow-up appointments.     Get plenty of rest. You may be unusually tired while you are pregnant.     Get at least 30 minutes of exercise on most days of the week. Walking is a good choice. If you have not exercised in the past, start out slowly. Take several short walks each day.     Do not smoke. If you need help quitting, talk to your doctor about stop-smoking programs. These can increase your chances of quitting for good.     Do not touch cat feces or litter boxes. Also, wash your hands after you handle raw meat, and fully cook all meat before you eat it. Wear gloves when you work in the yard or garden, and wash your hands well when you are done. Cat feces, raw or undercooked meat, and contaminated dirt can cause an infection that may harm your baby or lead to a miscarriage.      Avoid things that can make your body too hot and may be harmful to your baby, such as a hot tub or sauna. Or talk with your doctor before doing anything that raises your body temperature. Your doctor can tell you if it's safe.     Avoid chemical fumes, paint fumes, or poisons.     Do not use illegal drugs, marijuana, or alcohol.   Medicines    Review all of your medicines with your doctor. Some of your routine medicines may need to be changed to protect your baby.     Use acetaminophen (Tylenol) to relieve minor problems, such as a mild headache or backache or a mild fever with cold symptoms. Do not use nonsteroidal anti-inflammatory drugs (NSAIDs), such as ibuprofen (Advil, Motrin) or naproxen (Aleve), unless your doctor says it is okay.     Do not take two or more pain medicines at the same time unless the doctor told you to. Many pain medicines have acetaminophen, which is Tylenol. Too much acetaminophen (Tylenol) can be harmful.     Take your medicines exactly as prescribed. Call your doctor if you think you are having a problem with your medicine.   To manage morning sickness    Keep food in your stomach, but not too much at once. Try eating five or six small meals a day instead of three large meals.     For nausea when you wake up, eat a small snack, such as a couple of crackers or pretzels, before rising. Allow a few minutes for your stomach to settle before you slowly get up.     Try to avoid smells and foods that make you feel nauseated. High-fat or greasy foods, milk, and coffee may make nausea worse. Some foods that may be easier to tolerate include cold, spicy, sour, and salty foods.     Drink enough fluids. Water and other caffeine-free drinks are good choices.     Take your prenatal vitamins at night on a full stomach.     Try foods and drinks made with shefali. Shefali may help with nausea.     Get lots of rest. Morning sickness may be worse when you are tired.     Talk to your doctor about  "over-the-counter products, such as vitamin B6 or doxylamine, to help relieve symptoms.     Try a P6 acupressure wrist band. These anti-nausea wristbands help some people.   Follow-up care is a key part of your treatment and safety. Be sure to make and go to all appointments, and call your doctor if you are having problems. It's also a good idea to know your test results and keep a list of the medicines you take.  Where can you learn more?  Go to https://www.[employer].net/patiented  Enter E868 in the search box to learn more about \"Learning About Pregnancy.\"  Current as of: July 10, 2023               Content Version: 14.0    5402-8097 [employer].   Care instructions adapted under license by your healthcare professional. If you have questions about a medical condition or this instruction, always ask your healthcare professional. [employer] disclaims any warranty or liability for your use of this information.      Weeks 6 to 10 of Your Pregnancy: Care Instructions  During these weeks of pregnancy, your body goes through many changes. You may start to feel different, both in your body and your emotions. Each pregnancy is different, so there's no \"right\" way to feel. These early weeks are a time to make healthy choices for you and your pregnancy.    Take a daily prenatal vitamin. Choose one with folic acid in it.    Avoid alcohol, tobacco, and drugs (including marijuana). If you need help quitting, talk to your doctor.    Drink plenty of liquids.  Be sure to drink enough water. And limit sodas, other sweetened drinks, and caffeine.     Choose foods that are good sources of calcium, iron, and folate.  You can try dairy products, dark leafy greens, fortified orange juice and cereals, almonds, broccoli, dried fruit, and beans.     Avoid foods that may be harmful.  Don't eat raw meat, deli meat, raw seafood, or raw eggs. Avoid soft cheese and unpasteurized dairy, like Brie and blue cheese. And " "don't eat fish that contains a lot of mercury, like shark and swordfish.     Don't touch ankit litter or cat poop.  They can cause an infection that could be harmful during pregnancy.     Avoid things that can make your body too hot.  For example, avoid hot tubs and saunas.     Soothe morning sickness.  Try eating 5 or 6 small meals a day, getting some fresh air, or using deepa to control symptoms.     Ask your doctor about flu and COVID-19 shots.  Getting them can help protect against infection.   Follow-up care is a key part of your treatment and safety. Be sure to make and go to all appointments, and call your doctor if you are having problems. It's also a good idea to know your test results and keep a list of the medicines you take.  Where can you learn more?  Go to https://[email or web address]/patiented  Enter G112 in the search box to learn more about \"Weeks 6 to 10 of Your Pregnancy: Care Instructions.\"  Current as of: July 10, 2023               Content Version: 14.0    0133-7933 [employer].   Care instructions adapted under license by your healthcare professional. If you have questions about a medical condition or this instruction, always ask your healthcare professional. [employer] disclaims any warranty or liability for your use of this information.         Pregnancy: Managing Morning Sickness (01:48)  Your health professional recommends that you watch this short online health video.  Learn how to manage morning sickness during pregnancy.   Purpose:  Goal: Learn how to manage morning sickness during pregnancy.    Watch: Scan the QR code or visit the link to view video       https://hwi.se/r/[email or web address]  Current as of: July 10, 2023  Content Version: 14.1    7982-6468 [employer].   Care instructions adapted under license by your healthcare professional. If you have questions about a medical condition or this instruction, always ask your healthcare professional. " [employer], [facility] disclaims any warranty or liability for your use of this information.    Pregnancy and Heartburn: Care Instructions  Overview     Heartburn is a common problem during pregnancy.  Heartburn happens when stomach acid backs up into the tube that carries food to the stomach. This tube is called the esophagus. Early in pregnancy, heartburn is caused by hormone changes that slow down digestion. Later on, it's also caused by the large uterus pushing up on the stomach.  Even though you can't fix the cause, there are things you can do to get relief. Treating heartburn during pregnancy focuses first on making lifestyle changes, like changing what and how you eat, and on taking medicines.  Heartburn usually improves or goes away after childbirth.  Follow-up care is a key part of your treatment and safety. Be sure to make and go to all appointments, and call your doctor if you are having problems. It's also a good idea to know your test results and keep a list of the medicines you take.  How can you care for yourself at home?  Eat small, frequent meals.  Avoid foods that make your symptoms worse, such as chocolate, peppermint, and spicy foods. Avoid drinks with caffeine, such as coffee, tea, and sodas.  Avoid bending over or lying down after meals.  Take a short walk after you eat.  If heartburn is a problem at night, do not eat for 2 hours before bedtime.  Take antacids like Mylanta, Maalox, Rolaids, or Tums. Do not take antacids that have sodium bicarbonate, magnesium trisilicate, or aspirin. Be careful when you take over-the-counter antacid medicines. Many of these medicines have aspirin in them. While you are pregnant, do not take aspirin or medicines that contain aspirin unless your doctor says it is okay.  If you're not getting relief, talk to your doctor. You may be able to take a stronger acid-reducing medicine.  When should you call for help?   Call your doctor now or seek immediate medical  "care if:    You have new or worse belly pain.     You are vomiting.   Watch closely for changes in your health, and be sure to contact your doctor if:    You have new or worse symptoms of reflux.     You are losing weight.     You have trouble or pain swallowing.     You do not get better as expected.   Where can you learn more?  Go to https://www.[employer].net/patiented  Enter U946 in the search box to learn more about \"Pregnancy and Heartburn: Care Instructions.\"  Current as of: July 10, 2023  Content Version: 14.1 2006-2024 [employer].   Care instructions adapted under license by your healthcare professional. If you have questions about a medical condition or this instruction, always ask your healthcare professional. [employer] disclaims any warranty or liability for your use of this information.    Constipation: Care Instructions  Overview     Constipation means that you have a hard time passing stools (bowel movements). People pass stools from 3 times a day to once every 3 days. What is normal for you may be different. Constipation may occur with pain in the rectum and cramping. The pain may get worse when you try to pass stools. Sometimes there are small amounts of bright red blood on toilet paper or the surface of stools. This is because of enlarged veins near the rectum (hemorrhoids).  A few changes in your diet and lifestyle may help you avoid ongoing constipation. Your doctor may also prescribe medicine to help loosen your stool.  Some medicines can cause constipation. These include pain medicines and antidepressants. Tell your doctor about all the medicines you take. Your doctor may want to make a medicine change to ease your symptoms.  Follow-up care is a key part of your treatment and safety. Be sure to make and go to all appointments, and call your doctor if you are having problems. It's also a good idea to know your test results and keep a list of the medicines you " "take.  How can you care for yourself at home?  Drink plenty of fluids. If you have kidney, heart, or liver disease and have to limit fluids, talk with your doctor before you increase the amount of fluids you drink.  Include high-fiber foods in your diet each day. These include fruits, vegetables, beans, and whole grains.  Get at least 30 minutes of exercise on most days of the week. Walking is a good choice. You also may want to do other activities, such as running, swimming, cycling, or playing tennis or team sports.  Take a fiber supplement, such as Citrucel or Metamucil, every day. Read and follow all instructions on the label.  Schedule time each day for a bowel movement. A daily routine may help. Take your time having a bowel movement, but don't sit for more than 10 minutes at a time. And don't strain too much.  Support your feet with a small step stool when you sit on the toilet. This helps flex your hips and places your pelvis in a squatting position.  Your doctor may recommend an over-the-counter laxative to relieve your constipation. Examples are Milk of Magnesia and MiraLax. Read and follow all instructions on the label. Do not use laxatives on a long-term basis.  When should you call for help?   Call your doctor now or seek immediate medical care if:    You have new or worse belly pain.     You have new or worse nausea or vomiting.     You have blood in your stools.   Watch closely for changes in your health, and be sure to contact your doctor if:    Your constipation is getting worse.     You do not get better as expected.   Where can you learn more?  Go to https://www.[email or web address].net/patiented  Enter P343 in the search box to learn more about \"Constipation: Care Instructions.\"  Current as of: October 19, 2023               Content Version: 14.0    1072-8230 [employer].   Care instructions adapted under license by your healthcare professional. If you have questions about a medical condition " "or this instruction, always ask your healthcare professional. [employer] disclaims any warranty or liability for your use of this information.      Learning About High-Iron Foods  What foods are high in iron?     The foods you eat contain nutrients, such as vitamins and minerals. Iron is a nutrient. Your body needs the right amount to stay healthy and work as it should. You can use the list below to help you make choices about which foods to eat.  Here are some foods that contain iron. They have 1 to 2 milligrams of iron per serving.  Fruits  Figs (dried), 5 figs  Vegetables  Asparagus (canned), 6 kuhn  Keke, beet, Swiss chard, or turnip greens, 1 cup  Dried peas, cooked,   cup  Seaweed, spirulina (dried),   cup  Spinach, (cooked)   cup or (raw) 1 cup  Grains  Cereals, fortified with iron, 1 cup  Grits (instant, cooked), fortified with iron,   cup  Meats and other protein foods  Beans (kidney, lima, navy, white), canned or cooked,   cup  Beef or lamb, 3 oz  Chicken giblets, 3 oz  Chickpeas (garbanzo beans),   cup  Liver of beef, lamb, or pork, 3 oz  Oysters (cooked), 3 oz  Sardines (canned), 3 oz  Soybeans (boiled),   cup  Tofu (firm),   cup  Work with your doctor to find out how much of this nutrient you need. Depending on your health, you may need more or less of it in your diet.  Where can you learn more?  Go to https://www.[email or web address]/patiented  Enter R005 in the search box to learn more about \"Learning About High-Iron Foods.\"  Current as of: September 20, 2023  Content Version: 14.1 2006-2024 [employer].   Care instructions adapted under license by your healthcare professional. If you have questions about a medical condition or this instruction, always ask your healthcare professional. [employer] disclaims any warranty or liability for your use of this information.    Rh Antibodies Screening During Pregnancy: About This Test  What is it?     The Rh antibodies " "screening test is a blood test. It checks your blood for Rh antibodies. If you have Rh-negative blood and have been exposed to Rh-positive blood, your immune system may make antibodies to attack the Rh-positive blood. When a pregnant woman has these antibodies, it is called Rh sensitization.  Why is this test done?  The Rh antibodies screening test is done during pregnancy to find out if your baby is at risk for Rh disease. This can happen if you have Rh-negative blood and your baby has Rh-positive blood. If your Rh-negative blood mixes with Rh-positive blood, your immune system will make antibodies to attack the Rh-positive blood.  During pregnancy, these antibodies could attach to the baby's red blood cells. This can cause your baby to have serious health problems. The results of this test will help your doctor know how to best care for you and your baby during your pregnancy.  How do you prepare for the test?  In general, there's nothing you have to do before this test, unless your doctor tells you to.  How is the test done?  A health professional uses a needle to take a blood sample, usually from the arm.  What happens after the test?  You will probably be able to go home right away. It depends on the reason for the test.  You can go back to your usual activities right away.  Follow-up care is a key part of your treatment and safety. Be sure to make and go to all appointments, and call your doctor if you are having problems. It's also a good idea to keep a list of the medicines you take. Ask your doctor when you can expect to have your test results.  Where can you learn more?  Go to https://www.[employer].net/patiented  Enter P722 in the search box to learn more about \"Rh Antibodies Screening During Pregnancy: About This Test.\"  Current as of: July 10, 2023  Content Version: 14.1    4069-0265 [employer].   Care instructions adapted under license by your healthcare professional. If you have " questions about a medical condition or this instruction, always ask your healthcare professional. Healthwise, [facility] disclaims any warranty or liability for your use of this information.    Learning About Preventing Rh Disease  What is Rh disease?     Rh disease can be a serious problem in pregnancy. It happens when substances called antibodies in the mother's blood cause red blood cells in her baby's blood to be destroyed. This can occur when the blood types of a mother and her baby do not match.  All blood has an Rh factor. This is what makes a blood type positive or negative. When you are Rh-negative, your baby may be Rh-negative or Rh-positive. If your baby has Rh-positive blood and it mixes with yours, your body will make antibodies. This is called Rh sensitization.  Most of the time, this is not a problem in a first pregnancy. But in future pregnancies, it could cause Rh disease.  A  with Rh disease has mild anemia and may have jaundice. In severe cases, anemia, jaundice, and swelling can be very dangerous or fatal. Some babies need to be delivered early. Some need special care in the NICU. A very sick baby will need a blood transfusion before or after birth.  Fortunately, Rh sensitization is usually easy to prevent.  That's why it's important to get your Rh status checked in your first trimester. It doesn't cause any warning signs. A blood test is the only way to know if you are Rh-sensitive or are at risk for it.  How can you prevent Rh disease?  If you are Rh-negative, your doctor gives you an Rh immune globulin shot (such as RhoGAM). It helps prevent your body from making the antibodies that attack your baby's red blood cells.  Timing is important. You need the shot at certain times during your pregnancy. And you need one anytime there is a chance that your baby's blood might mix with yours. That can happen with certain prenatal tests or when you have pregnancy bleeding, such as:  Right after  "any pregnancy loss, amniocentesis, or CVS testing.  After turning of a breech baby.  Before and maybe after childbirth. Your doctor gives you a shot around week 28. If your  is Rh-positive, you will have another shot.  Follow-up care is a key part of your treatment and safety. Be sure to make and go to all appointments, and call your doctor if you are having problems. It's also a good idea to know your test results and keep a list of the medicines you take.  Where can you learn more?  Go to https://www.[email or web address]/patiented  Enter W177 in the search box to learn more about \"Learning About Preventing Rh Disease.\"  Current as of: July 10, 2023  Content Version: 14. [employer].   Care instructions adapted under license by your healthcare professional. If you have questions about a medical condition or this instruction, always ask your healthcare professional. [employer] disclaims any warranty or liability for your use of this information.    Learning About Rh Immunoglobulin Shots  Introduction     An Rh immunoglobulin shot is given to pregnant women who have Rh-negative blood.  You may have Rh-negative blood, and your baby may have Rh-positive blood. If the two types of blood mix, your body will make antibodies. This is called Rh sensitization. Most of the time, this is not a problem the first time you're pregnant. But it could cause problems in future pregnancies.  This shot keeps your body from making the antibodies. You get the shot around 28 weeks of pregnancy. After the birth, your baby's blood is tested. If the blood is Rh positive, you will get another shot. You may also get the shot if you have vaginal bleeding while you are pregnant or if you have a miscarriage. These shots protect future pregnancies.  Women with Rh negative blood will need this shot each time they get pregnant.  Example  Rh immunoglobulin (HypRho-D, MICRhoGAM, and RhoGAM)  Possible side " "effects  Rare side effects may include:  Some mild pain where you got the shot.  A slight fever.  An allergic reaction.  You may have other side effects not listed here. Check the information that comes with your medicine.  What to know about taking this medicine  You may need more than one shot. You may need the shot again:  After amniocentesis, fetal blood sampling, or chorionic villus sampling tests.  If you have bleeding in your second or third trimester.  After turning of a breech baby.  After an injury to the belly while you are pregnant.  After a miscarriage or an .  Before or right after treatment for an ectopic or a partial molar pregnancy.  Tell your doctor if you have any allergies or have had a bad response to medicines in the past.  If you get this shot within 3 months of getting a live-virus vaccine, the vaccine may not work. Your doctor will tell you if you need more vaccine.  Check with your doctor or pharmacist before you use any other medicines. This includes over-the-counter medicines. Make sure your doctor knows all of the medicines, vitamins, herbs, and supplements you take. Taking some medicines at the same time can cause problems.  Where can you learn more?  Go to https://www.[employer].net/patiented  Enter V615 in the search box to learn more about \"Learning About Rh Immunoglobulin Shots.\"  Current as of: July 10, 2023               Content Version: 14.0    6440-1305 [employer].   Care instructions adapted under license by your healthcare professional. If you have questions about a medical condition or this instruction, always ask your healthcare professional. [employer] disclaims any warranty or liability for your use of this information.      Rubella (Australian Measles): Care Instructions  Overview  Rubella, also called Australian measles or 3-day measles, is a disease caused by a virus. It spreads by coughs, sneezes, and close contact. Rubella usually is mild " and does not cause long-term problems. But if you are pregnant and get it, you can give the disease to your unborn baby. This can cause serious birth defects.  While you have rubella, you may get a rash and a mild fever, and the lymph glands in your neck may swell. Older children often have a fever, eye pain, a sore throat, and body aches. You can relieve most symptoms with care at home. Avoid being around others, especially pregnant people, until your rash has been gone for at least 4 days. People who have not had this disease before or have not had the vaccine have the greatest chance of getting the virus.  Follow-up care is a key part of your treatment and safety. Be sure to make and go to all appointments, and call your doctor if you are having problems. It's also a good idea to know your test results and keep a list of the medicines you take.  How can you care for yourself at home?  Drink plenty of fluids. If you have kidney, heart, or liver disease and have to limit fluids, talk with your doctor before you increase the amount of fluids you drink.  Get plenty of rest to help your body heal.  Take an over-the-counter pain medicine, such as acetaminophen (Tylenol), ibuprofen (Advil, Motrin), or naproxen (Aleve), to reduce fever and discomfort. Read and follow all instructions on the label. Do not give aspirin to anyone younger than 20. It has been linked to Reye syndrome, a serious illness.  Do not take two or more pain medicines at the same time unless the doctor told you to. Many pain medicines have acetaminophen, which is Tylenol. Too much acetaminophen (Tylenol) can be harmful.  Try not to scratch the rash. Put cold, wet cloths on the rash to reduce itching.  Do not smoke. Smoking can make your symptoms worse. If you need help quitting, talk to your doctor about stop-smoking programs and medicines. These can increase your chances of quitting for good.  Avoid contact with people who have never had rubella and  "who have not been immunized.  When should you call for help?   Call your doctor now or seek immediate medical care if:    You have a fever with a stiff neck or a severe headache.     You are sensitive to light or feel very sleepy or confused.   Watch closely for changes in your health, and be sure to contact your doctor if:    You do not get better as expected.   Where can you learn more?  Go to https://[email or web address].net/patiented  Enter B812 in the search box to learn more about \"Rubella (Mexican Measles): Care Instructions.\"  Current as of: 2023               Content Version: 14.0    2492-8066 [employer].   Care instructions adapted under license by your healthcare professional. If you have questions about a medical condition or this instruction, always ask your healthcare professional. [employer] disclaims any warranty or liability for your use of this information.      Gonorrhea and Chlamydia: About These Tests  What is it?  These tests use a sample of urine or other body fluid to look for the bacteria that cause these sexually transmitted infections (STIs). The fluid sample can come from the cervix, vagina, rectum, throat, or eyes.  Why is this test done?  These tests may be done to:  Find out if symptoms are caused by gonorrhea or chlamydia.  Check people who are at high risk of being infected with gonorrhea or chlamydia.  Retest people several months after they have been treated for gonorrhea or chlamydia.  Check for infection in your  if you had a gonorrhea or chlamydia infection at the time of delivery.  How can you prepare for the test?  If you are going to have a urine test, do not urinate for at least 1 hour before the test.  If you think you may have chlamydia or gonorrhea, don't have sexual intercourse until you get your test results. And you may want to have tests for other STIs, such as HIV.  How is the test done?  For a direct sample, a swab is used to " "collect body fluid from the cervix, vagina, rectum, throat, or eyes. Your doctor may collect the sample. Or you may be given instructions on how to collect your own sample.  For a urine sample, you will collect the urine that comes out when you first start to urinate. Don't wipe the genital area clean before you urinate.  How long does the test take?  The test will take a few minutes.  What happens after the test?  You will be able to go home right away.  You can go back to your usual activities right away.  If you do have an infection, don't have sexual intercourse for 7 days after you start treatment. And your sex partner(s) should also be treated.  Follow-up care is a key part of your treatment and safety. Be sure to make and go to all appointments, and call your doctor if you are having problems. It's also a good idea to keep a list of the medicines you take. Ask your doctor when you can expect to have your test results.  Where can you learn more?  Go to https://www.[employer]i.Rising/patiented  Enter K976 in the search box to learn more about \"Gonorrhea and Chlamydia: About These Tests.\"  Current as of: November 27, 2023  Content Version: 14.1 2006-2024 [employer].   Care instructions adapted under license by your healthcare professional. If you have questions about a medical condition or this instruction, always ask your healthcare professional. [employer] disclaims any warranty or liability for your use of this information.    Trichomoniasis: About This Test  What is it?     This test uses a sample of urine or other body fluid to look for the tiny parasite that causes trichomoniasis (also called trich). The fluid sample can come from the vagina, cervix, or urethra. Your doctor may choose to use one or more of many available tests.  Why is it done?  A trich test may be done to:  Find out if symptoms are caused by trich.  Check people who are at high risk for being infected with " trich.  Check after treatment to make sure that the infection is gone.  How do you prepare for the test?  If you are going to have a urine test, do not urinate for at least 1 hour before the test.  How is the test done?  For a direct sample, a swab is used to collect body fluid from the cervix, vagina, or urethra. Your doctor may collect the sample. Or you may be given instructions on how to collect your own sample.  For a urine sample, you will collect the urine that comes out when you first start to urinate. Don't wipe the area clean before you urinate.  How long does the test take?  It will take a few minutes to collect a sample.  What happens after the test?  You can go home right away.  You can go back to your usual activities right away.  You may get the test results the same day or several days later. It depends on the test used.  If you do have an infection, don't have sexual intercourse for 7 days after you start treatment. Your sex partner or partners should also be treated.  Follow-up care is a key part of your treatment and safety. Be sure to make and go to all appointments, and call your doctor if you are having problems. Ask your doctor when you can expect to have your test results.  Current as of: November 27, 2023  Content Version: 14.1    5794-5058 [employer].   Care instructions adapted under license by your healthcare professional. If you have questions about a medical condition or this instruction, always ask your healthcare professional. [employer] disclaims any warranty or liability for your use of this information.    HIV Testing: Care Instructions  Overview  You can get tested for the human immunodeficiency virus (HIV). Most doctors use a blood test to check for HIV antibodies and antigens in your blood. It may also check for the genetic material (RNA) of HIV. Some tests use saliva to check for HIV antibodies. But these aren't as accurate. For example, they may  "give a false result if you've just been infected.  What do the results mean?    Normal (negative)    No HIV antibodies, antigens, or RNA were found.  You may need more testing. It can make sure your test results are correct.    Uncertain (indeterminate)    Test results didn't clearly show if you have an HIV infection.  HIV antibodies or antigens may not have formed yet.  Some other type of antibody or antigen may have affected the results.  You will need another test to be sure.    Abnormal (positive)    HIV antibodies, antigens, or RNA were found.  If you haven't had an RNA test yet, one will be done. If it's positive, you have HIV.  If your test result is positive, your doctor will talk to you. You will discuss starting treatment.  Follow-up care is a key part of your treatment and safety. Be sure to make and go to all appointments, and call your doctor if you are having problems. It's also a good idea to know your test results and keep a list of the medicines you take.  Where can you learn more?  Go to https://www.[employer].net/patiented  Enter T792 in the search box to learn more about \"HIV Testing: Care Instructions.\"  Current as of: June 12, 2023               Content Version: 14.0    2827-4322 [employer].   Care instructions adapted under license by your healthcare professional. If you have questions about a medical condition or this instruction, always ask your healthcare professional. [employer] disclaims any warranty or liability for your use of this information.      Hepatitis C Virus Tests: About These Tests  What are they?     Hepatitis C virus tests are blood tests that check for substances in the blood that show whether you have hepatitis C now or had it in the past. The tests can also tell you what type of hepatitis C you have and how severe the disease is. This can help your doctor with treatment.  If the tests show that you have long-term hepatitis C, you need to take " "steps to prevent spreading the disease.  Why are these tests done?  You may need these tests if:  You have symptoms of hepatitis.  You may have been exposed to the virus. You are more likely to have been exposed to the virus if you inject drugs or are exposed to body fluids (such as if you are a health care worker).  You've had other tests that show you have liver problems.  You are 18 to 79 years old.  You have an HIV infection.  The tests also are done to help your doctor decide about your treatment and see how well it works.  How do you prepare for the test?  In general, there's nothing you have to do before this test, unless your doctor tells you to.  How is the test done?  A health professional uses a needle to take a blood sample, usually from the arm.  What happens after these tests?  You will probably be able to go home right away.  You can go back to your usual activities right away.  Follow-up care is a key part of your treatment and safety. Be sure to make and go to all appointments, and call your doctor if you are having problems. It's also a good idea to keep a list of the medicines you take. Ask your doctor when you can expect to have your test results.  Where can you learn more?  Go to https://www.[employer].net/patiented  Enter W551 in the search box to learn more about \"Hepatitis C Virus Tests: About These Tests.\"  Current as of: June 12, 2023               Content Version: 14.0    9476-9228 [employer].   Care instructions adapted under license by your healthcare professional. If you have questions about a medical condition or this instruction, always ask your healthcare professional. [employer] disclaims any warranty or liability for your use of this information.      Learning About Fetal Ultrasound Results  What is a fetal ultrasound?     Fetal ultrasound is a test that lets your doctor see an image of your baby. Your doctor learns information about your baby from this " picture. You may find out, for example, if you are having a boy or a girl. But the main reason you have this test is to get information about your baby's health.  (You may hear your baby called a fetus. This is a common medical term for a baby that's growing in the mother's uterus.)  What kind of information can you learn from this test?  The findings of an ultrasound fall into two categories, normal and abnormal.  Normal  The fetus is the right size for its age.  The placenta is the expected size and does not cover the cervix.  There is enough amniotic fluid in the uterus.  No birth defects can be seen.  Abnormal  The fetus is small or large for its age.  The placenta covers the cervix.  There is too much or too little amniotic fluid in the uterus.  The fetus may have a birth defect.  What does an abnormal result mean?  Abnormal seems to imply that something is wrong with your baby. But what it means is that the test has shown something the doctor wants to take a closer look at.  And that's what happens next. Your doctor will talk to you about what further test or tests you may need.  What do the results mean?  Some of the things your doctor may see on an abnormal ultrasound include:  Echogenic bowel.  The bowel looks very bright on the screen. This could mean that there's blood in the bowel. Or it could mean that something is blocking the small bowel.  Increased nuchal translucency.  The ultrasound measures the thickness at the back of the baby's neck. An increase in thickness is sometimes an early sign of Down syndrome.  Increased or decreased amniotic fluid.  The doctor will look for a reason for the level of amniotic fluid and will watch the pregnancy closely as it progresses.  Large ventricles.  Ventricles in the brain look larger than they should. Your doctor may take a closer look at the brain.  Renal pyelectasis/hydronephrosis.  The ultrasound measures the fluid around the kidney. If there is more fluid  "than expected, there is a chance of urinary tract or kidney problems.  Short long bones.  The ultrasound measures certain arm and leg bones. A long bone (humerus or femur) that is shorter than average could be a sign of Down syndrome.  Subchorionic hemorrhage.  An ultrasound can show bleeding under one of the membranes that surrounds the fetus. Some women don't have symptoms of bleeding. The ultrasound can find this problem when women are not bleeding from their vagina. Women who have this condition have a slightly higher chance of miscarriage.  What do you do now?  Take a deep breath, and let it out. Keep in mind that an abnormal finding on an ultrasound, after it's coupled with more information, may:  Turn out to be nothing.  Turn out to be something mild that won't affect the baby.  Turn out to be something more serious. But if this happens, early diagnosis helps you and your doctor plan treatment options sooner rather than later.  Your medical team is there for you. So are your family and friends. Ask questions, and get the help and support you need.  Follow-up care is a key part of your treatment and safety. Be sure to make and go to all appointments, and call your doctor if you are having problems. It's also a good idea to know your test results and keep a list of the medicines you take.  Where can you learn more?  Go to https://www.[employer].net/patiented  Enter K451 in the search box to learn more about \"Learning About Fetal Ultrasound Results.\"  Current as of: July 10, 2023  Content Version: 14.1 2006-2024 [employer].   Care instructions adapted under license by your healthcare professional. If you have questions about a medical condition or this instruction, always ask your healthcare professional. [employer] disclaims any warranty or liability for your use of this information.    Learning About Prenatal Visits  Overview     Regular prenatal visits are very important during " any pregnancy. These quick office visits may seem simple and routine. But they can help you have a safe and healthy pregnancy. Your doctor is watching for problems that can only be found through regular checkups. The visits also give you and your doctor time to build a good relationship.  After your first visit, you will most likely start on a schedule of monthly visits. In your third trimester, the visits will get more frequent. Based on your health, your age, and if you've had a normal, full-term pregnancy before, your doctor may want to see you more or less often.  At different times in your pregnancy, you will have exams and tests. Some are routine. Others are done only when there is a chance of a problem. Everything healthy you do for your body helps you have a healthy pregnancy. Rest when you need it. Eat well, drink plenty of water, and exercise regularly.  What happens during a prenatal visit?  You will have blood pressure checks, along with urine tests. You also may have blood tests. If you need to go to the bathroom while waiting for the doctor, tell the nurse. You will be given a sample cup so your urine can be tested.  You will be weighed and have your belly measured.  Your doctor may listen to the fetal heartbeat with a special device.  At about 24 weeks, and possibly earlier in your pregnancy, your doctor will check your blood sugar (glucose tolerance test) for diabetes that can occur during pregnancy. This is gestational diabetes, which can be harmful.  You will have tests to check for infections that could harm your . These include group B streptococcus and hepatitis B.  Your doctor may do ultrasounds to check for problems. This also checks the position of the fetus. An ultrasound uses sound waves to produce a picture of the fetus.  You may get your vaccines updated.  Your doctor may ask you questions to check for signs of anxiety or depression. Tell your doctor if you feel sad, anxious, or  "hopeless for more than a few days.  You may have other tests at any time during your pregnancy.  Use your visits to discuss with your doctor any concerns you have.  How can you care for yourself at home?  Get plenty of rest.  Try to exercise every day, if your doctor says it is okay. If you have not exercised in the past, start out slowly. For example, you can take short walks each day.  Choose healthy foods, such as fruits, vegetables, whole grains, lean proteins, low-fat dairy, and healthy fats.  Drink plenty of fluids. Cut down on drinks with caffeine, such as coffee, tea, and cola. If you have kidney, heart, or liver disease and have to limit fluids, talk with your doctor before you increase the amount of fluids you drink.  Try to avoid chemical fumes, paint fumes, and poisons.  If you smoke, vape, or use alcohol, marijuana, or other drugs, quit or cut back as much as you can. Talk to your doctor if you need help quitting.  Review all of your medicines, including over-the-counter medicines and supplements, with your doctor. Some of your routine medicines may need to be changed. Do not stop or start taking any medicines without talking to your doctor first.  Follow-up care is a key part of your treatment and safety. Be sure to make and go to all appointments, and call your doctor if you are having problems. It's also a good idea to know your test results and keep a list of the medicines you take.  Where can you learn more?  Go to https://www.[employer].net/patiented  Enter J502 in the search box to learn more about \"Learning About Prenatal Visits.\"  Current as of: July 10, 2023               Content Version: 14.0    7946-6549 [employer].   Care instructions adapted under license by your healthcare professional. If you have questions about a medical condition or this instruction, always ask your healthcare professional. [employer] disclaims any warranty or liability for your use of this " information.      Intimate Partner Violence: Care Instructions  Overview     If you want to save this information but don't think it is safe to take it home, see if a trusted friend can keep it for you. Plan ahead. Know who you can call for help, and memorize the phone number.   Be careful online too. Your online activity may be seen by others. Do not use your personal computer or device to read about this topic. Use a safe computer, such as one at work, a friend's home, or a library.    Intimate partner violence--a type of domestic abuse--is different from an argument now and then. It is a pattern of abuse that one person may use to control another person's behavior. It may start with threats and name-calling. Then, it may lead to more serious acts, like pushing and slapping. The abuse also may occur in other areas. For example, the abuser may withhold money or spend a partner's money without their knowledge.  Abuse can cause serious harm. You are more likely to have a long-term health problem from the injuries and stress of living in a violent relationship. People who are sexually abused by their partners have more sexually transmitted infections and unplanned pregnancies. Anyone who is abused also faces emotional pain. Anyone can be abused in relationships. In some relationships, both people use abusive behavior.  If you are pregnant, abuse can cause problems such as poor weight gain, infections, and bleeding. Abuse during this time may increase your baby's risk of low birth weight, premature birth, and death.  Follow-up care is a key part of your treatment and safety. Be sure to make and go to all appointments, and call your doctor if you are having problems. It's also a good idea to know your test results and keep a list of the medicines you take.  How can you care for yourself at home?  If you do not have a safe place to stay, discuss this with your doctor before you leave.  Have a plan for where to go, how to  "leave your home, and where to stay in case of an emergency. Do not tell your partner about your plan. Contact:  The National Domestic Violence Hotline toll-free at 1-401.931.6629. They can help you find resources in your area.  Your local police department, hospital, or clinic for information about shelters and safe homes near you.  Talk to a trusted friend or neighbor, a counselor, or a marcos leader. Do not feel that you have to hide what happened.  Teach your children how to call for help in an emergency.  Be alert to warning signs, such as threats, heavy alcohol use, or drug use. This can help you avoid danger.  If you can, make sure that there are no guns or other weapons in your home.  When should you call for help?   Call 911 anytime you think you may need emergency care. For example, call if:    You or someone else has just been abused.     You think you or someone else is in danger of being abused.   Watch closely for changes in your health, and be sure to contact your doctor if you have any problems.  Where can you learn more?  Go to https://www.[employer].net/patiented  Enter G282 in the search box to learn more about \"Intimate Partner Violence: Care Instructions.\"  Current as of: June 24, 2023               Content Version: 14.0    [id number] [email or web address].   Care instructions adapted under license by your healthcare professional. If you have questions about a medical condition or this instruction, always ask your healthcare professional. [employer] disclaims any warranty or liability for your use of this information.      Intimate Partner Violence Safety Instructions: Care Instructions  Overview     If you want to save this information but don't think it is safe to take it home, see if a trusted friend can keep it for you. Plan ahead. Know who you can call for help, and memorize the phone number.   Be careful online too. Your online activity may be seen by others. Do not use your " personal computer or device to read about this topic. Use a safe computer, such as one at work, a friend's home, or a library.    When you are abused by a spouse or partner, you can take actions to protect yourself and your children.  You can increase your safety whether you decide to stay with your spouse or partner or you decide to leave. You may want to make a safety plan and pack a bag ahead of time. This will help you leave quickly when you decide to. Remember, you cannot change your partner's actions, but you can find help for you and your children. No one deserves to be abused.  Follow-up care is a key part of your treatment and safety. Be sure to make and go to all appointments, and call your doctor if you are having problems. It's also a good idea to know your test results and keep a list of the medicines you take.  How can you care for yourself at home?  Make a plan for your safety   If you decide to stay with your abusive spouse or partner, you can do the following to increase your safety:  Decide what works best to keep you safe in an emergency.  Know who you can call to help you in an emergency.  Decide if you will call the police if you get hurt again. If you can, agree on a signal with your children or neighbor to call the police for you if you need help. You can flash lights or hang something out of a window.  Choose a safe place to go for a short time if you need to leave home. Memorize the address and phone number.  Learn escape routes out of your home in case you need to leave in a hurry. Teach your children different ways to get out of your home quickly if they need to.  If you can, hide or lock up things that can be used as weapons (guns, knives, hammers).  Learn the number of a domestic violence shelter. Talk to the people there about how they can help.  Find out about other community resources that can help you.  Take pictures of bruises or other injuries if you can. You can also take pictures  of things your abuser has broken.  Teach your children that violence is never okay. Tell them that they do not deserve to be hurt.  Pack a bag   Prepare a bag with things you will need if you leave suddenly. Leave it with a friend, a relative, or someone else you trust. You could include the following items in the bag:  A set of keys to your home and car.  Emergency phone numbers and addresses.  Money such as cash or checks. You can also ask a friend, a relative, or someone else you trust to hold money for you.  Copies of legal documents such as house and car titles or rent receipts, birth certificates, Social Security card, voter registration, marriage and 's licenses, and your children's health records.  Personal items you would need for a few days, such as clothes, a toothbrush, toothpaste, and any medicines you or your children need.  A favorite toy or book for your child or children.  Diapers and bottles, if you have very young children.  Pictures that show signs of abuse and violence. You may also add pictures of your abuser.  If you leave   If you decide to leave, you can take the following steps:  Go to the emergency room at a hospital if you have been hurt.  Think about asking the police to be with you as you leave. They can protect you as you leave your home.  If you decide to leave secretly, remember that activities can be tracked. Your abuser may still have access to your cell phone, email, and credit cards. It may be possible for these to be traced. Always be aware of your surroundings.  If this is an emergency, do not worry about gathering up anything. Just leave--your safety is most important.  If your abuser moves out, change the locks on the doors. If you have a security system, change the access code.  When should you call for help?   Call 911 anytime you think you may need emergency care. For example, call if:    You or someone else has just been abused.     You think you or someone else is  "in danger of being abused.   Watch closely for changes in your health, and be sure to contact your doctor if you have any problems.  Where can you learn more?  Go to https://www.[employer].net/patiented  Enter A752 in the search box to learn more about \"Intimate Partner Violence Safety Instructions: Care Instructions.\"  Current as of: June 24, 2023               Content Version: 14.0    3965-0018 [employer].   Care instructions adapted under license by your healthcare professional. If you have questions about a medical condition or this instruction, always ask your healthcare professional. [employer] disclaims any warranty or liability for your use of this information.      Learning About Intimate Partner Violence  What is intimate partner violence?  Intimate partner violence is a type of domestic abuse. It's threatening, emotionally harmful, or violent behavior in a personal relationship. It can happen between past or current partners or spouses. In some relationships both people abuse each other. One partner may be more abusive. Or the abuse may be equal.  Abuse can affect people of any ethnic group, race, or Jewish. It can affect teens, adults, or the elderly. And it can happen to people of any sexual orientation, gender, or social status.  Abusers use fear, bullying, and threats to control their partners. They may control what their partners do. They may control where their partners go or who they see. They may act jealous, controlling, or possessive. These early signs of abuse may happen soon after the start of the relationship. Sometimes it can be hard to notice abuse at first. But after the relationship becomes more serious, the abuse may get worse.  If you are being abused in your relationship, it's important to get help. The abuse is not your fault. You don't have to face it alone.  Be careful  It may not be safe to take home domestic abuse information like this handout. Some " people ask a trusted friend to keep it for them. It's also important to plan ahead and to memorize the phone number of places you can go for help. If you are concerned about your safety, do not use your computer, smartphone, or tablet to read about domestic abuse.   What are the types of intimate partner violence?  Abuse can happen in different ways. Each type can happen on its own or in combination with others.  Emotional abuse  Emotional abuse is a pattern of threats, insults, or controlling behavior. It includes verbal abuse. It goes beyond healthy disagreements in a relationship. It's a sign of an unhealthy relationship.  Do you feel threatened, intimidated, or controlled?  Does your partner:  Threaten your children, other family members, or pets?  Use jokes meant to embarrass or shame you?  Call you names?  Tell you that you are a bad parent?  Threaten to take away your children?  Threaten to have you or your family members deported?  Control your access to money or other basic needs?  Control what you do, who you see or talk to, or where you go?  Another form of emotional abuse is denying that it is happening. Or the abuser may act like the abuse is no big deal or is your fault.  Sexual abuse  With sexual abuse, abusers may try to convince or force you to have sex. They may force you into sex acts you're not comfortable with. Or they may sexually assault you. Sexual abuse can happen even if you are in a committed relationship.  Physical abuse  Physical abuse means that a partner hits, kicks, or does something else to physically hurt you. Physical abuse that starts with a slap might lead to kicking, shoving, and choking over time. The abuser may also threaten to hurt or kill you.  Stalking  Stalking means that an abuser gives you attention that you do not want and that causes you fear. Examples of stalking include:  Following you.  Showing up at places where the abuser isn't invited, such as at your work or  school.  Constantly calling or texting you.  What problems can  to?  Intimate partner violence can be very dangerous. It can cause serious, repeated injury. It can even lead to death.  All forms of abuse can cause long-term health problems from the stress of a violent relationship. Verbal abuse can lead to sexual and physical abuse.  Abuse causes:  Emotional pain.  Depression.  Anxiety.  Post-traumatic stress.  Sexual abuse can lead to sexually transmitted infections (such as HIV/AIDS) and unplanned pregnancy.  Pregnancy can be a very dangerous time for people in abusive relationships. Abuse can cause or increase the risk of problems during pregnancy. These include low weight gain, anemia, infections, and bleeding. Abuse may also increase your baby's risk of low birth weight, premature birth, and death.  It can be hard for some victims of abuse to ask for help or to leave their relationship. You may feel scared, stuck, or not sure what steps to take. But it's important not to ignore abuse. Talking to someone you trust could be the first step to ending the abuse and taking care of your own health and happiness again. There are resources available that can help keep you safe.  Where can you get help?  Talk to a trusted friend. Find a local advocacy group, or talk to your doctor about the abuse.  Contact the National Domestic Violence Hotline at 3-870-110-YJKJ (1-755.531.8284) for more safety tips. They can guide you to groups in your area that can help. Or go to the National Coalition Against Domestic Violence website at www.thehotlSpotivate.org to learn more.  Domestic violence groups or a counselor in your area can help you make a safety plan for yourself and your children.  When to call for help  Call 911 anytime you think you may need emergency care. For example, call if:  You think that you or someone you know is in danger of being abused.  You have been hurt and can't have someone safely take you to emergency  "care.  You have just been abused.  A family member has just been abused.  Where can you learn more?  Go to https://www.[employer].net/patiented  Enter S665 in the search box to learn more about \"Learning About Intimate Partner Violence.\"  Current as of: June 24, 2023  Content Version: 14.1 2006-2024 [employer].   Care instructions adapted under license by your healthcare professional. If you have questions about a medical condition or this instruction, always ask your healthcare professional. [employer] disclaims any warranty or liability for your use of this information.    Vaginal Bleeding During Pregnancy: Care Instructions  Overview     It's common to have some vaginal spotting when you are pregnant. In some cases, the bleeding isn't serious. And there aren't any more problems with the pregnancy.  But sometimes bleeding is a sign of a more serious problem. This is more common if the bleeding is heavy or painful. Examples of more serious problems include miscarriage, an ectopic pregnancy, and a problem with the placenta.  You may have to see your doctor again to be sure everything is okay. You may also need more tests to find the cause of the bleeding.  Home treatment may be all you need. But it depends on what is causing the bleeding. Be sure to tell your doctor if you have any new symptoms or if your symptoms get worse.  The doctor has checked you carefully, but problems can develop later. If you notice any problems or new symptoms, get medical treatment right away.  Follow-up care is a key part of your treatment and safety. Be sure to make and go to all appointments, and call your doctor if you are having problems. It's also a good idea to know your test results and keep a list of the medicines you take.  How can you care for yourself at home?  If your doctor prescribed medicines, take them exactly as directed. Call your doctor if you think you are having a problem with your " "medicine.  Do not have vaginal sex until your doctor says it's okay.  Do not put anything in your vagina until your doctor says it's okay.  Ask your doctor about other activities you can or can't do.  Get a lot of rest. Being pregnant can make you tired.  Do not use nonsteroidal anti-inflammatory drugs (NSAIDs), such as ibuprofen (Advil, Motrin), naproxen (Aleve), or aspirin, unless your doctor says it is okay.  When should you call for help?   Call 911 anytime you think you may need emergency care. For example, call if:    You passed out (lost consciousness).     You have severe vaginal bleeding. This means you are soaking through a pad each hour for 2 or more hours.     You have sudden, severe pain in your belly or pelvis.   Call your doctor now or seek immediate medical care if:    You have new or worse vaginal bleeding.     You are dizzy or lightheaded, or you feel like you may faint.     You have pain in your belly, pelvis, or lower back.     You think that you are in labor.     You have a sudden release of fluid from your vagina.     You've been having regular contractions for an hour. This means that you've had at least 8 contractions within 1 hour or at least 4 contractions within 20 minutes, even after you change your position and drink fluids.     You notice that your baby has stopped moving or is moving much less than normal.   Watch closely for changes in your health, and be sure to contact your doctor if you have any problems.  Where can you learn more?  Go to https://www.Skyscraper.net/patiented  Enter N829 in the search box to learn more about \"Vaginal Bleeding During Pregnancy: Care Instructions.\"  Current as of: July 10, 2023               Content Version: 14.0    0318-9992 [employer] Incorporated.   Care instructions adapted under license by your healthcare professional. If you have questions about a medical condition or this instruction, always ask your healthcare professional. [employer], " [facility] disclaims any warranty or liability for your use of this information.      Weeks 10 to 14 of Your Pregnancy: Care Instructions  It's now possible to hear the fetus's heartbeat with a special ultrasound device. And the fetus's organs are developing.    Decide about tests to check for birth defects. Think about your age, your chance of passing on a family disease, your need to know about any problems, and what you might do after you have the test results.    It's okay to exercise. Try activities such as walking or swimming. Check with your doctor before starting a new program.    You may feel more tired than usual.  Taking naps during the day may help.     You may feel emotional.  It might help to talk to someone.     You may have headaches.  Try lying down and putting a cool cloth over your forehead.     You can use acetaminophen (Tylenol) for pain relief.  Don't take any anti-inflammatory medicines (such as Advil, Motrin, Aleve), unless your doctor says it's okay.     You may feel a fullness or aching in your lower belly.  This can feel like the kind of cramps you might get before a period. A back rub may help.     You may need to urinate more.  Your growing uterus and changing hormones can affect your bladder.     You may feel sick to your stomach (morning sickness).  Try avoiding food and smells that make you feel sick.     Your breasts may feel different.  They may feel tender or get bigger. Your nipples may get darker. Try a bra that gives you good support.     Avoid alcohol, tobacco, and drugs (including marijuana).  If you need help quitting, talk to your doctor.     Take a daily prenatal vitamin.  Choose one with folic acid.   Follow-up care is a key part of your treatment and safety. Be sure to make and go to all appointments, and call your doctor if you are having problems. It's also a good idea to know your test results and keep a list of the medicines you take.  Where can you learn more?  Go  "to https://www.Deerpath Energy.net/patiented  Enter E090 in the search box to learn more about \"Weeks 10 to 14 of Your Pregnancy: Care Instructions.\"  Current as of: July 10, 2023               Content Version: 14.0    8408-9429 [employer].   Care instructions adapted under license by your healthcare professional. If you have questions about a medical condition or this instruction, always ask your healthcare professional. [employer] disclaims any warranty or liability for your use of this information.        Nutrition During Pregnancy: Care Instructions  Overview     Healthy eating when you are pregnant is important for you and your baby. It can help you feel well and have a successful pregnancy and delivery. During pregnancy your nutrition needs increase. Even if you have excellent eating habits, your doctor may recommend a multivitamin to make sure you get enough iron and folic acid.  You may wonder how much weight you should gain. In general, if you were at a healthy weight before you became pregnant, then you should gain between 25 and 35 pounds. If you were overweight before pregnancy, then you'll likely be advised to gain 15 to 25 pounds. If you were underweight before pregnancy, then you'll probably be advised to gain 28 to 40 pounds. Your doctor will work with you to set a weight goal that is right for you. Gaining a healthy amount of weight helps you have a healthy baby.  Follow-up care is a key part of your treatment and safety. Be sure to make and go to all appointments, and call your doctor if you are having problems. It's also a good idea to know your test results and keep a list of the medicines you take.  How can you care for yourself at home?  Eat plenty of fruits and vegetables. Include a variety of orange, yellow, and leafy dark-green vegetables every day.  Choose whole-grain bread, cereal, and pasta. Good choices include whole wheat bread, whole wheat pasta, brown rice, and " oatmeal.  Get 4 or more servings of milk and milk products each day. Good choices include nonfat or low-fat milk, yogurt, and cheese. If you cannot eat milk products, you can get calcium from calcium-fortified products such as orange juice, soy milk, and tofu. Other non-milk sources of calcium include leafy green vegetables, such as broccoli, kale, mustard greens, turnip greens, bok yael, and brussels sprouts.  If you eat meat, pick lower-fat types. Good choices include lean cuts of meat and chicken or turkey without the skin.  Avoid fish that are high in mercury. These include shark, swordfish, raysa mackerel, marlin, orange roughy, and bigeye tuna, as well as tilefish from the [facility].  It's okay to eat up to 8 to 12 ounces a week of fish that are low in mercury or up to 4 ounces a week of fish that have medium levels of mercury. Some fish that are low in mercury are salmon, shrimp, canned light tuna, cod, and tilapia. Some fish that have medium levels of mercury are halibut and white albacore tuna.  For more advice about eating fish, you can visit the U.S. Food and Drug Administration (FDA) or U.S. Environmental Protection Agency (EPA) website.  Heat lunch meats (such as turkey, ham, or bologna) to 165 F before you eat them. This reduces your risk of getting sick from a kind of bacteria that can be found in lunch meats.  Do not eat unpasteurized soft cheeses, such as brie, feta, fresh mozzarella, and blue cheese. They have a bacteria that could harm your baby.  Limit caffeine to about 200 to 300 mg per day. On average, a cup of brewed coffee has around 80 to 100 mg of caffeine.  Do not drink any alcohol. No amount of alcohol has been found to be safe during pregnancy.  Do not diet or try to lose weight. For example, do not follow a low-carbohydrate diet. If you are overweight at the start of your pregnancy, your doctor will work with you to manage your weight gain.  Tell your doctor about all vitamins and  "supplements you take.  When should you call for help?  Watch closely for changes in your health, and be sure to contact your doctor if you have any problems.  Where can you learn more?  Go to https://www.Helios.net/patiented  Enter Y785 in the search box to learn more about \"Nutrition During Pregnancy: Care Instructions.\"  Current as of: September 20, 2023               Content Version: 14.0    6686-1173 [employer].   Care instructions adapted under license by your healthcare professional. If you have questions about a medical condition or this instruction, always ask your healthcare professional. [employer] disclaims any warranty or liability for your use of this information.      Exercise During Pregnancy: Care Instructions  Overview     Exercise is good for you during a healthy pregnancy. It can relieve back pain, swelling, and other discomforts. It also prepares your muscles for childbirth. And exercise can improve your energy level and help you sleep better.  If your doctor advises it, get more exercise. For example, walking is a good choice. Bit by bit, increase the amount you walk every day. Try for at least 30 minutes on most days of the week. You could also try a prenatal exercise class. But if you do not already exercise, be sure to talk with your doctor before you start a new exercise program. Doctors do not recommend contact sports during pregnancy.  Follow-up care is a key part of your treatment and safety. Be sure to make and go to all appointments, and call your doctor if you are having problems. It's also a good idea to know your test results and keep a list of the medicines you take.  How can you care for yourself at home?  Talk with your doctor about the right kind of exercise for each stage of pregnancy.  Listen to your body to know if your exercise is at a safe level.  Do not become overheated while you exercise. High body temperature can be harmful. Avoid " "activities that can make your body too hot.  If you feel tired, take it easy. You might walk instead of run.  If you are used to strenuous exercise, ask your doctor how to know when it's time to slow down.  If you exercised before getting pregnant, you should be able to keep up your routine early in your pregnancy. Later in your pregnancy, you may want to switch to more gentle activities.  Drink plenty of fluids before, during, and after exercise.  Avoid contact sports, such as soccer and basketball. Also avoid risky activities. These include scuba diving, horseback riding, and exercising at a high altitude (above 6,000 feet). If you live in a place with a high altitude, talk to your doctor about how you can exercise safely.  Do not get overtired while you exercise. You should be able to talk while you work out.  After your fourth month of pregnancy, avoid exercises that require you to lie flat on your back on a hard surface. These include sit-ups and some yoga poses.  Get plenty of rest. You may be very tired while you are pregnant.  Where can you learn more?  Go to https://www.[employer] Inc.net/patiented  Enter S801 in the search box to learn more about \"Exercise During Pregnancy: Care Instructions.\"  Current as of: July 10, 2023               Content Version: 14.0    4901-6842 [employer].   Care instructions adapted under license by your healthcare professional. If you have questions about a medical condition or this instruction, always ask your healthcare professional. [employer] disclaims any warranty or liability for your use of this information.      Learning About Pregnancy and Obesity  How does your weight affect your pregnancy?     The basics of prenatal care are the same for everyone, regardless of size. You'll get what you need to have a healthy baby.  But your size can make a difference in a few things. You and your doctor will have to watch your pregnancy weight. Your weight " "may affect your labor and delivery.  You may have some extra doctor visits and tests. And you may have some tests earlier in your pregnancy. You'll need to pay close attention to things like blood pressure and the chance of getting gestational diabetes. (This is a type of diabetes that sometimes happens during pregnancy.) And close attention will be given to your developing baby.  Work with your doctor to get the care you need. Go to all your doctor visits, and follow your doctor's advice about what to do and what to avoid during pregnancy.  How much weight gain is healthy?  If you are very overweight (obese), experts recommend that you gain between 11 and 20 pounds. Your doctor will work with you to set a weight goal that's right for you. In some cases, your doctor may recommend that you not gain any weight.  How much extra food do you need to eat?  Although you may joke that you're \"eating for two\" during pregnancy, you don't need to eat twice as much food. How much you can eat depends on:  Your height.  How much you weigh when you get pregnant.  How active you are.  If you're carrying more than one fetus (multiple pregnancy).  In the first trimester, you'll probably need the same amount of calories as you did before you were pregnant. In general, in your second trimester, you need to eat about 340 extra calories a day. In your third trimester, you need to eat about 450 extra calories a day.  What can you do to have a healthy pregnancy?  The best things you can do for you and your baby are to eat healthy foods, get regular exercise, avoid alcohol and smoking, and go to your doctor visits.  Eat a variety of foods from all the food groups. Make sure to get enough calcium and folic acid.  You may want to work with a dietitian to help you plan healthy meals to get the right amount of calories for you.  If you didn't exercise much before you got pregnant, talk to your doctor about how you can slowly get more active. " "Your doctor may want to set up an exercise program with you.  Where can you learn more?  Go to https://www.Figleaves.com.net/patiented  Enter B644 in the search box to learn more about \"Learning About Pregnancy and Obesity.\"  Current as of: July 10, 2023  Content Version: 14.1 2006-2024 [employer].   Care instructions adapted under license by your healthcare professional. If you have questions about a medical condition or this instruction, always ask your healthcare professional. [employer] disclaims any warranty or liability for your use of this information.    You have been provided the CDC Warning Signs in Pregnancy document.    Additional copies can be found here: www.Modabound.com/025724.pdf  "

## 2024-08-28 ENCOUNTER — VIRTUAL VISIT (OUTPATIENT)
Dept: OBGYN | Facility: CLINIC | Age: 29
End: 2024-08-28
Attending: OBSTETRICS & GYNECOLOGY
Payer: COMMERCIAL

## 2024-08-28 ENCOUNTER — TELEPHONE (OUTPATIENT)
Dept: OBGYN | Facility: CLINIC | Age: 29
End: 2024-08-28
Payer: COMMERCIAL

## 2024-08-28 DIAGNOSIS — F43.23 ADJUSTMENT DISORDER WITH MIXED ANXIETY AND DEPRESSED MOOD: ICD-10-CM

## 2024-08-28 DIAGNOSIS — M54.50 LOW BACK PAIN: Primary | ICD-10-CM

## 2024-08-28 DIAGNOSIS — F32.9 MAJOR DEPRESSION: ICD-10-CM

## 2024-08-28 DIAGNOSIS — F32.9 MAJOR DEPRESSION: Primary | ICD-10-CM

## 2024-08-28 DIAGNOSIS — Z87.891 HISTORY OF CIGAR SMOKING: ICD-10-CM

## 2024-08-28 RX ORDER — ACETAMINOPHEN 325 MG/1
325-650 TABLET ORAL EVERY 6 HOURS PRN
COMMUNITY

## 2024-08-28 RX ORDER — VITAMIN A, VITAMIN C, VITAMIN D-3, VITAMIN E, VITAMIN B-1, VITAMIN B-2, NIACIN, VITAMIN B-6, CALCIUM, IRON, ZINC, COPPER 4000; 120; 400; 22; 1.84; 3; 20; 10; 1; 12; 200; 27; 25; 2 [IU]/1; MG/1; [IU]/1; MG/1; MG/1; MG/1; MG/1; MG/1; MG/1; UG/1; MG/1; MG/1; MG/1; MG/1
TABLET ORAL DAILY
COMMUNITY

## 2024-08-28 ASSESSMENT — ANXIETY QUESTIONNAIRES
GAD7 TOTAL SCORE: 18
5. BEING SO RESTLESS THAT IT IS HARD TO SIT STILL: SEVERAL DAYS
7. FEELING AFRAID AS IF SOMETHING AWFUL MIGHT HAPPEN: NEARLY EVERY DAY
GAD7 TOTAL SCORE: 18
IF YOU CHECKED OFF ANY PROBLEMS ON THIS QUESTIONNAIRE, HOW DIFFICULT HAVE THESE PROBLEMS MADE IT FOR YOU TO DO YOUR WORK, TAKE CARE OF THINGS AT HOME, OR GET ALONG WITH OTHER PEOPLE: VERY DIFFICULT
3. WORRYING TOO MUCH ABOUT DIFFERENT THINGS: NEARLY EVERY DAY
1. FEELING NERVOUS, ANXIOUS, OR ON EDGE: NEARLY EVERY DAY
6. BECOMING EASILY ANNOYED OR IRRITABLE: NEARLY EVERY DAY
2. NOT BEING ABLE TO STOP OR CONTROL WORRYING: NEARLY EVERY DAY

## 2024-08-28 ASSESSMENT — PATIENT HEALTH QUESTIONNAIRE - PHQ9
SUM OF ALL RESPONSES TO PHQ QUESTIONS 1-9: 11
5. POOR APPETITE OR OVEREATING: MORE THAN HALF THE DAYS

## 2024-08-28 NOTE — TELEPHONE ENCOUNTER
Patient had mentioned to the nurse doing her OB intake appointment that she was interested I getting set up with mental health counseling during her pregnancy as she has sruggled with her mental health in the [facility] and she denied current suicidal ideation, bu does feel strongly about having therapy during her current pregnancy.  She also has been having issues with nausea, Patient was advised on a list of interventions she can take to help with the nausea, such as crackers by her bed, deepa candies as well as doxylamine 25mg with vitamin B6 at bedtme to help with morning nausea. Patient was informed to call back if these interventions are not helpful. Patient agreed to the pan.

## 2024-08-28 NOTE — PROGRESS NOTES
[facility] RN NOB Intake note    29 year old female newly pregnant.  LMP: ultrasound 24 [facility] due date 3-27-25  6w 1days  unsure  Pregnancy is unplanned but coping well.      Partner/support person name and relationship - fiance  Sae  , dad richi and Ewakarson.        Symptoms since LMP include bleeding after intercourse, nausea, vomiting, bowel changes, and fatigue. Patient has tried these relief   measures: small frequent meals and increased rest.    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 nurse to call her.  Hx shoulder dystocia in previous preganncy       PERSONAL/SOCIAL HISTORY  partnered  with children.  Employment: Full time as a [employer]   .  Job involves moderate activity and long periods of standing .  Her partner works as a [employer]org[employer].     MENTAL HEALTH HISTORY:  anxiety, depression, and hx of hospitalization see yeison zara 7 and phq-9  would like to astart therapy- unplanned pregnancy making her very anxious-  routed to RN to help set this up today.         - Current Medications reviewed   Current Outpatient Medications   Medication Sig Dispense Refill    acetaminophen (TYLENOL) 325 MG tablet Take 325-650 mg by mouth every 6 hours as needed for mild pain.      Prenatal Vit-Fe Fumarate-FA (PRENATAL MULTIVITAMIN  PLUS IRON) 27-1 MG TABS Take by mouth daily.       ondansetron (ZOFRAN) 4 MG tablet Take 8 mg by mouth every 8 hours as needed (Patient not taking: Reported on 8/28/2024)      vitamin D3 (CHOLECALCIFEROL) 50 mcg (2000 units) tablet Take 1 tablet by mouth daily (Patient not taking: Reported on 8/28/2024)             - Co-morbids reviewed   Past Medical History:   Diagnosis Date    Adjustment disorder with mixed anxiety and depressed mood 03/23/2010    Adjustment disorder with mixed emotional features 03/23/2010    History of cigar smoking 04/29/2014    Low back pain 09/27/2014    Major depression 06/11/2015    Nexplanon removal 06/13/2023    was inserted in 2015 by report    Personal history of urinary tract infection     recurrent    Substance use disorder     Suicidal behavior 02/11/2010    Cutting. Seen in Er @ [facility] for self inflicted laceration.           - Genetic/Infection questionnaire completed, risks include diabetes runs in the family. Discussed genetic screening options, patient does desire first trimester genetic screening  Pt  does not have a recent known exposure to Parvo or CMV so IgG/IgM testing WILL NOT be ordered.   -Flu Vaccine.  Flu Vaccine Given last dose   COVID Vaccine: Has received most recent COVID booster   initial series given last dose 5-5-23  - Discussed expectations for routine prenatal care and scheduling.  - Discussed highlights from The Expectant Family booklet on warning signs, safe pregnancy and prevention of infections diseases, nutrition and exercise.  - Patient was encouraged to start prenatal vitamins as tolerated, if experiencing nausea and vomiting then OK to switch to folic acid only at this time.    - Additional items: Has been given information regarding WIC  Knows about Way to Grow  Denies past or present domestic violence, sexual and psychological abuse.  - Reconciled and reviewed problem list  - Pt scheduled for NOB on 9-9-24 with Asuncion Shields LPN

## 2024-08-28 NOTE — LETTER
2024       RE: Supriya Feng  [address]  [place] [address]     Dear Colleague,    Thank you for referring your patient, Supriya Feng, to the [facility] [place] [facility]. Please see a copy of my visit note below.    [facility] RN NOB Intake note    29 year old female newly pregnant.  LMP: ultrasound 24 [facility] due date 3-27-25  6w 1days  unsure  Pregnancy is unplanned but coping well.      Partner/support person name and relationship - fiance  Sae  , dad richi and Brad.        Symptoms since LMP include bleeding after intercourse, nausea, vomiting, bowel changes, and fatigue. Patient has tried these relief   measures: small frequent meals and increased rest.    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  Hx shoulder dystocia in previous preganncy       PERSONAL/SOCIAL HISTORY  partnered  with children.  Employment: Full time as a [employer]   .  Job involves moderate activity and long periods of standing .  Her partner works as a [employer].     MENTAL HEALTH HISTORY:  anxiety, depression, and hx of hospitalization see noes zara 7 and phq-9  would like to astart therapy- unplanned pregnancy making her very anxious-  routed to RN to help set this  up today.         - Current Medications reviewed   Current Outpatient Medications   Medication Sig Dispense Refill     acetaminophen (TYLENOL) 325 MG tablet Take 325-650 mg by mouth every 6 hours as needed for mild pain.       Prenatal Vit-Fe Fumarate-FA (PRENATAL MULTIVITAMIN  PLUS IRON) 27-1 MG TABS Take by mouth daily.       ondansetron (ZOFRAN) 4 MG tablet Take 8 mg by mouth every 8 hours as needed (Patient not taking: Reported on 8/28/2024)       vitamin D3 (CHOLECALCIFEROL) 50 mcg (2000 units) tablet Take 1 tablet by mouth daily (Patient not taking: Reported on 8/28/2024)             - Co-morbids reviewed   Past Medical History:   Diagnosis Date     Adjustment disorder with mixed anxiety and depressed mood 03/23/2010     Adjustment disorder with mixed emotional features 03/23/2010     History of cigar smoking 04/29/2014     Low back pain 09/27/2014     Major depression 06/11/2015     Nexplanon removal 06/13/2023    was inserted in 2015 by report     Personal history of urinary tract infection     recurrent     Substance use disorder      Suicidal behavior 02/11/2010    Cutting. Seen in Er @ [facility] for self inflicted laceration.           - Genetic/Infection questionnaire completed, risks include diabetes runs in the family. Discussed genetic screening options, patient does desire first trimester genetic screening  Pt  does not have a recent known exposure to Parvo or CMV so IgG/IgM testing WILL NOT be ordered.   -Flu Vaccine.  Flu Vaccine Given last dose   COVID Vaccine: Has received most recent COVID booster   initial series given last dose 5-5-23  - Discussed expectations for routine prenatal care and scheduling.  - Discussed highlights from The Expectant Family booklet on warning signs, safe pregnancy and prevention of infections diseases, nutrition and exercise.  - Patient was encouraged to start prenatal vitamins as tolerated, if experiencing nausea and vomiting then OK to switch to folic  acid only at this time.    - Additional items: Has been given information regarding WIC  Knows about Way to Grow  Denies past or present domestic violence, sexual and psychological abuse.  - Reconciled and reviewed problem list  - Pt scheduled for NOB on 9-9-24 with Asuncion Shields LPN             Again, thank you for allowing me to participate in the care of your patient.      Sincerely,    [facility] NURSE EDUCATION

## 2024-09-09 PROBLEM — Z87.59 HISTORY OF SHOULDER DYSTOCIA IN PRIOR PREGNANCY: Status: ACTIVE | Noted: 2024-09-09

## 2024-09-09 NOTE — PROGRESS NOTES
[facility]   New OB Visit    HPI: 29 year old  at 11w6d by 6w1d US here today for initial OB visit.     She initially expressed feeling overwhelmed and confused as to how she would like to proceed with this pregnancy, but felt that continuing with a new OB visit today would be most beneficial. Later in the visit she expressed that she plans to continue this pregnancy.     She reports AM nausea daily with occasional episodes of emesis. She is currently managing symptoms with diet, drinking ginger ale or ice water after eating, and walking after eating which has been helpful with her symptoms. She also notes increased fatigue and mood changes, specifically feeling emotions more strongly, over the past few weeks. She did have spotting about 2 weeks ago which lasted for a day, and has not had any bleeding since then. She does also note increased vaginal discharge with an associated odor. She has not had vaginal itching or urinary symptoms.     Pregnancy is complicated by:  - History of shoulder dystocia   - MDD    OBGYN history:    Age at menarche: 10  Length of menstrual cycle: 28-35 days   Duration of menses: 3 to 4 days  Heavy bleeding: first 2 days are heavy bleeding   Pain with menses: no  LMP: 23  Sexually active: yes, with one male partner  Last pap: 2023 NILM  History of abnormal paps: No  History of STIs: gonorrhea and chlamydia positive 23, completed treatment     Past Medical History:   Diagnosis Date    Adjustment disorder with mixed anxiety and depressed mood 2010    Adjustment disorder with mixed emotional features 2010    History of cigar smoking 2014    Low back pain 2014    Major depression 2015    Nexplanon removal 2023    was inserted in  by report    Personal history of urinary tract infection     recurrent    Substance use disorder     Suicidal behavior 2010    Cutting. Seen in Er @ [facility] for self inflicted laceration.  "    Past Surgical History:   Procedure Laterality Date    widom teeth       Family History   Problem Relation Age of Onset    Substance Abuse Mother     Mental Illness Mother     Diabetes Father     Cancer Father     Diabetes Paternal Grandmother     Cancer Paternal Grandmother     Diabetes Paternal Grandfather     Diabetes Other      Social History     Socioeconomic History    Marital status: Single     Spouse name: Sae smith    Number of children: 1    Years of education: Not on file    Highest education level: Not on file   Occupational History    Not on file   Tobacco Use    Smoking status: Former     Types: Cigarettes    Smokeless tobacco: Never   Vaping Use    Vaping status: Former   Substance and Sexual Activity    Alcohol use: Not Currently     Comment: history    Drug use: Not Currently     Types: \"Crack\" cocaine, Marijuana    Sexual activity: Yes     Partners: Male   Other Topics Concern    Not on file   Social History Narrative    Not on file     Social Determinants of Health     Financial Resource Strain: Not on file   Food Insecurity: Not on file   Transportation Needs: Not on file   Physical Activity: Not on file   Stress: Not on file   Social Connections: Not on file   Interpersonal Safety: Not on file   Housing Stability: Not on file       Patient has no known allergies.  Current Outpatient Medications   Medication Sig Dispense Refill    acetaminophen (TYLENOL) 325 MG tablet Take 325-650 mg by mouth every 6 hours as needed for mild pain.      Prenatal Vit-Fe Fumarate-FA (PRENATAL MULTIVITAMIN  PLUS IRON) 27-1 MG TABS Take by mouth daily.      ondansetron (ZOFRAN) 4 MG tablet Take 8 mg by mouth every 8 hours as needed (Patient not taking: Reported on 8/28/2024)      vitamin D3 (CHOLECALCIFEROL) 50 mcg (2000 units) tablet Take 1 tablet by mouth daily (Patient not taking: Reported on 8/28/2024)       Exam:   /77   Pulse 76   Ht 1.727 m (5' 8\")   Wt 87.1 kg (192 lb)   BMI 29.19 kg/m  "   Gen: alert, oriented, tearful throughout visit   CV: regular rate and rhythm, normal s1 and s2  Lungs: clear bilaterally  Abdomen: soft, gravid, non-tender   : patient declined     FHR: 160    Assessment: 29 year old  at 11w6d by 6w1d US here today for initial prenatal visit. Reports doing ok overall.     Plan:    # Prenatal care  Prenatal labs collected today   2. Prenatal testing: reviewed options for genetic screening, patient interested in first trimester screening, referral to MFM placed today.   3. Education: Clinic procedures / Outline prenatal care, limited education provided today as patient expressed feeling overwhelmed with amount of information.   4. Continue prenatal vitamins  5. Other/Follow-up: Follow up in 4 weeks.     # Increased vaginal discharge  - Vaginal Multiplex panel collected today along with prenatal labs     # Anxiety and Depression  - No current medications, not interested in starting anything at this point  - Symptoms have been exacerbated by fatigue since finding out she was pregnant    # History of shoulder dystocia   - Hgb A1c collected today   - Discuss route of delivery at future visits     RTC in 4 weeks    This patient was staffed with Dr. Aaliyah Mendoza MD PGY1  [facility]  Obstetrics, Gynecology, & Women's Health    I saw this patient with the resident and agree with the resident s findings and plan of care as documented in the resident s note.  I personally reviewed history and imaging  Key findings: NOB visit, discussed routine prenatal care and recommendations.  Decision for what would be completed was decided with shared decision making with patient.    Charline Fischer MD

## 2024-09-10 LAB
ABO/RH(D): NORMAL
ANTIBODY SCREEN: NEGATIVE
SPECIMEN EXPIRATION DATE: NORMAL

## 2024-09-10 NOTE — PATIENT INSTRUCTIONS
Thank you for trusting us with your care!   Please be aware, if you are on Mychart, you may see your results prior to your providers review. If labs are abnormal, we will call or message you on Mychart with a follow up plan.    If you need to contact us for questions about:  Symptoms, Scheduling & Medical Questions; Non-urgent (2-3 day response) Mychart message, Urgent (needing response today) [phone] (if after 3:30pm next day response)   Prescriptions: Please call your Pharmacy   Billing: Alexi [phone] or  Physicians:[phone]    Weeks 14 to 18 of Your Pregnancy: Care Instructions  Around this time, you may start to look pregnant. Your baby is now able to pass urine. And the first stool (meconium) is starting to collect in your baby's intestines. Hair is starting to grow on your baby's head.    You may notice some skin changes, such as itchy spots on your palms or acne on your face.    At your next doctor visit, you may have an ultrasound. So you might think about whether you want to know the sex of your baby. Also ask your doctor about flu and COVID-19 shots.     How to reduce stress   Ask for help when you need it.  Try to avoid things that cause you stress.  Seek out things that relieve stress, such as breathing exercises or yoga.     How to get exercise   If you don't usually exercise, start slowly. Short walks may be a good choice.  Try to be active 30 minutes a day, at least 5 days a week.  Avoid activities where you're more likely to fall.  Use light weights to reduce stress on your joints.     How to stay at a healthy weight for you   Talk to your doctor or midwife about how much weight you should gain.  It's generally best to gain:  About 28 to 40 pounds if you're underweight.  About 25 to 35 pounds if you're at a healthy weight.  About 15 to 25 pounds if you're overweight.  About 11 to 20 pounds if you're very overweight (obese).  Follow-up care is a key part of your treatment and safety.  "Be sure to make and go to all appointments, and call your doctor if you are having problems. It's also a good idea to know your test results and keep a list of the medicines you take.  Where can you learn more?  Go to https://www.[employer].net/patiented  Enter I453 in the search box to learn more about \"Weeks 14 to 18 of Your Pregnancy: Care Instructions.\"  Current as of: July 10, 2023               Content Version: 14.0    6661-6611 [employer].   Care instructions adapted under license by your healthcare professional. If you have questions about a medical condition or this instruction, always ask your healthcare professional. [employer] disclaims any warranty or liability for your use of this information.      Second-Trimester Fetal Ultrasound: About This Test  What is it?     Fetal ultrasound is a test that uses sound waves to make pictures of your baby (fetus) and placenta inside the uterus. The test is the safest way to find out the age, size, and position of your baby. You also may be able to find out the sex of your baby. (But the test isn't done just to find out a baby's sex.)  No known risks to the mother or the baby are linked to fetal ultrasound. But you may feel anxious if the test reveals a problem with your pregnancy or baby.  Why is this test done?  In the second trimester, a fetal ultrasound is done to:  Estimate the number of weeks and days a fetus has developed since the beginning of the pregnancy. This is called the gestational age.  Look at the size and position of the fetus, the placenta, and the fluid that surrounds the fetus.  Find major birth defects, such as heart problems or problems with the brain and spinal cord (neural tube defects). But the test may not be able to find many minor defects and some major birth defects.  How do you prepare for the test?  In general, there's nothing you have to do before this test, unless your doctor tells you to.  How is the " "test done?  You may be able to leave your clothes on, or you will be given a gown to wear.  You will lie on your back on a padded examination table.  A gel will be spread on your belly. It will be removed after the test.  A small, handheld device called a transducer will be pressed against the gel on your skin and moved across your belly several times.  You may watch the monitor to see the picture of your baby during the test.  What happens after the test?  You will probably be able to go home right away.  You most likely will be able to go back to your usual activities right away.  Follow-up care is a key part of your treatment and safety. Be sure to make and go to all appointments, and call your doctor if you are having problems. It's also a good idea to keep a list of the medicines you take. Ask your doctor when you can expect to have your test results.  Where can you learn more?  Go to https://www.[email or web address]/patiented  Enter Y671 in the search box to learn more about \"Second-Trimester Fetal Ultrasound: About This Test.\"  Current as of: July 10, 2023  Content Version: 14.1    5945-4909 [employer].   Care instructions adapted under license by your healthcare professional. If you have questions about a medical condition or this instruction, always ask your healthcare professional. [employer] disclaims any warranty or liability for your use of this information.    During Pregnancy: Exercises  Introduction  Here are some examples of exercises to do during your pregnancy. Start each exercise slowly. Ease off the exercise if you start to have pain.  Talk to your doctor about when you can start these exercises and which ones will work best for you.  How to do the exercises  Neck rotation    Sit up straight in a firm chair, or stand up straight. If you're standing, keep your feet about hip-width apart.  Keeping your chin level, turn your head to the right and hold for 15 to 30 " seconds.  Turn your head to the left and hold for 15 to 30 seconds.  Repeat 2 to 4 times.  Neck stretch to the front    Sit up straight in a firm chair, or stand up straight. Look straight ahead. If you're standing, keep your feet about hip-width apart.  Slowly bend your head forward without moving your shoulders.  Hold for 15 to 30 seconds, then return to your starting position.  Repeat 2 to 4 times.  Back press    Stand with your back 10 to 12 inches away from a wall.  Lean into the wall until your back is against it. Press your lower back against the wall by pulling in your stomach muscles.  Slowly slide down until your knees are slightly bent, pressing your lower back against the wall.  Hold for at least 6 seconds, then slide back up the wall.  Repeat 8 to 12 times.  Over time, work up to holding this position for as much as 1 minute.  Trunk twist    Sit on the floor with your legs crossed. If that's not comfortable, you can sit on a folded blanket so your bottom is a few inches off the floor. Or you can sit on a chair with your knees hip-width apart and your feet flat on the floor.  Reach your left hand toward your right knee. You can place your right hand at your side for support.  Slowly twist your body (trunk) to your right.  Relax and return to your starting position.  Repeat 2 to 4 times.  Switch your hands and twist to your left.  Repeat 2 to 4 times.  Pelvic rocking on hands and knees    Start on your hands and knees. Place your wrists directly below your shoulders and your knees below your hips.  Breathe in slowly. Tuck your head downward and round your back up, making a curve with your back in the shape of the letter C. Hold this position for about 6 seconds.  Breathe out slowly and bring your head back up. Relax, keeping your back straight. (Don't allow it to curve toward the floor.) Hold for about 6 seconds.  Repeat 8 to 12 times, gently rocking your pelvis.  Pelvic tilt    Lie on your back with your  knees bent and your feet flat on the floor.  Tighten your belly muscles by pulling your belly button in toward your spine. Press your lower back to the floor. You should feel your hips and pelvis rock back.  Hold for 6 seconds while breathing smoothly, and then relax.  Repeat 8 to 12 times.  Do this exercise only during the first 4 months of pregnancy. After this point, lying on your back is not recommended, because it can cause blood flow problems for you and your baby.  Backward stretch    Start on your hands and knees with your knees 8 to 10 inches apart, hands directly below your shoulders, and arms and back straight.  Keeping your arms straight, slowly lower your buttocks toward your heels and tuck your head toward your knees. Hold for 15 to 30 seconds.  Slowly return to the starting position.  Repeat 2 to 4 times.  Forward bend    Sit comfortably in a chair, with your arms relaxed.  Slowly bend forward, allowing your arms to hang down. Lean only as far as you can without feeling discomfort or pressure on your belly.  Hold for 15 to 30 seconds and then slowly sit up straight.  Repeat 2 to 4 times or to your comfort level.  Donkey kick    Start on your hands and knees. Place your hands directly below your shoulders, and keep your arms straight.  Tighten your belly muscles by pulling your belly button in toward your spine. Keep breathing normally, and don't hold your breath.  Lift one knee and bring it toward your elbow.  Slowly extend that leg behind you without completely straightening it. Be careful not to let your hip drop down. Avoid arching your back.  Hold your leg behind you for about 6 seconds.  Return to your starting position.  Repeat 8 to 12 times for each leg.  Tailor sitting    Sit on the floor.  Bring your feet close to your body while crossing your ankles.  Keep your back straight. Relax your legs and let your knees drop toward the floor.  Hold this position for as long as you are  comfortable.  Toe reach    Sit on the floor with your back straight, legs about 12 inches apart, and feet relaxed outward.  Stretch your hands forward toward your right foot, then sit up.  Stretch your hands straight forward, then sit up.  Stretch your hands forward toward your left foot, then sit up.  Hold each stretch for 15 to 30 seconds.  Repeat 2 to 4 times.  Follow-up care is a key part of your treatment and safety. Be sure to make and go to all appointments, and call your doctor if you are having problems. It's also a good idea to know your test results and keep a list of the medicines you take.  Current as of: July 10, 2023               Content Version: 14.0    4297-4174 [employer].   Care instructions adapted under license by your healthcare professional. If you have questions about a medical condition or this instruction, always ask your healthcare professional. [employer] disclaims any warranty or liability for your use of this information.      Learning About Pregnancy  Your Care Instructions     Your health in the early weeks of your pregnancy is particularly important for your baby's health. Take good care of yourself. Anything you do that harms your body can also harm your baby.  Make sure to go to all of your doctor appointments. Regular checkups will help keep you and your baby healthy.  How can you care for yourself at home?  Diet    Choose healthy foods like fruits, vegetables, whole grains, lean proteins, and healthy fats.     Choose foods that are good sources of calcium, iron, and folate. You can try dairy products, dark leafy greens, fortified orange juice and cereals, almonds, broccoli, dried fruit, and beans.     Do not skip meals or go for many hours without eating. If you are nauseated, try to eat a small, healthy snack every 2 to 3 hours.     Avoid fish that are high in mercury. These include shark, swordfish, raysa mackerel, marlin, orange roughy, and bigeye  tuna, as well as tilefish from the [facility].     It's okay to eat up to 8 to 12 ounces a week of fish that are low in mercury or up to 4 ounces a week of fish that have medium levels of mercury. Some fish that are low in mercury are salmon, shrimp, canned light tuna, cod, and tilapia. Some fish that have medium levels of mercury are halibut and white albacore tuna.     Drink plenty of fluids. If you have kidney, heart, or liver disease and have to limit fluids, talk with your doctor before you increase the amount of fluids you drink.     Limit caffeine to about 200 to 300 mg per day. On average, a cup of brewed coffee has around 80 to 100 mg of caffeine.     Do not drink alcohol, such as beer, wine, or hard liquor.     Take a multivitamin that contains at least 400 micrograms (mcg) of folic acid to help prevent birth defects. Fortified cereal and whole wheat bread are good additional sources of folic acid.     Increase the calcium in your diet. Try to drink a quart of skim milk each day. You may also take calcium supplements and choose foods such as cheese and yogurt.   Lifestyle    Make sure you go to your follow-up appointments.     Get plenty of rest. You may be unusually tired while you are pregnant.     Get at least 30 minutes of exercise on most days of the week. Walking is a good choice. If you have not exercised in the past, start out slowly. Take several short walks each day.     Do not smoke. If you need help quitting, talk to your doctor about stop-smoking programs. These can increase your chances of quitting for good.     Do not touch cat feces or litter boxes. Also, wash your hands after you handle raw meat, and fully cook all meat before you eat it. Wear gloves when you work in the yard or garden, and wash your hands well when you are done. Cat feces, raw or undercooked meat, and contaminated dirt can cause an infection that may harm your baby or lead to a miscarriage.     Avoid things that can  make your body too hot and may be harmful to your baby, such as a hot tub or sauna. Or talk with your doctor before doing anything that raises your body temperature. Your doctor can tell you if it's safe.     Avoid chemical fumes, paint fumes, or poisons.     Do not use illegal drugs, marijuana, or alcohol.   Medicines    Review all of your medicines with your doctor. Some of your routine medicines may need to be changed to protect your baby.     Use acetaminophen (Tylenol) to relieve minor problems, such as a mild headache or backache or a mild fever with cold symptoms. Do not use nonsteroidal anti-inflammatory drugs (NSAIDs), such as ibuprofen (Advil, Motrin) or naproxen (Aleve), unless your doctor says it is okay.     Do not take two or more pain medicines at the same time unless the doctor told you to. Many pain medicines have acetaminophen, which is Tylenol. Too much acetaminophen (Tylenol) can be harmful.     Take your medicines exactly as prescribed. Call your doctor if you think you are having a problem with your medicine.   To manage morning sickness    Keep food in your stomach, but not too much at once. Try eating five or six small meals a day instead of three large meals.     For nausea when you wake up, eat a small snack, such as a couple of crackers or pretzels, before rising. Allow a few minutes for your stomach to settle before you slowly get up.     Try to avoid smells and foods that make you feel nauseated. High-fat or greasy foods, milk, and coffee may make nausea worse. Some foods that may be easier to tolerate include cold, spicy, sour, and salty foods.     Drink enough fluids. Water and other caffeine-free drinks are good choices.     Take your prenatal vitamins at night on a full stomach.     Try foods and drinks made with shefali. Shefali may help with nausea.     Get lots of rest. Morning sickness may be worse when you are tired.     Talk to your doctor about over-the-counter products, such as  "vitamin B6 or doxylamine, to help relieve symptoms.     Try a P6 acupressure wrist band. These anti-nausea wristbands help some people.   Follow-up care is a key part of your treatment and safety. Be sure to make and go to all appointments, and call your doctor if you are having problems. It's also a good idea to know your test results and keep a list of the medicines you take.  Where can you learn more?  Go to https://www.[employer].net/patiented  Enter E868 in the search box to learn more about \"Learning About Pregnancy.\"  Current as of: July 10, 2023               Content Version: 14.0    3920-3683 [employer].   Care instructions adapted under license by your healthcare professional. If you have questions about a medical condition or this instruction, always ask your healthcare professional. [employer] disclaims any warranty or liability for your use of this information.      Weeks 6 to 10 of Your Pregnancy: Care Instructions  During these weeks of pregnancy, your body goes through many changes. You may start to feel different, both in your body and your emotions. Each pregnancy is different, so there's no \"right\" way to feel. These early weeks are a time to make healthy choices for you and your pregnancy.    Take a daily prenatal vitamin. Choose one with folic acid in it.    Avoid alcohol, tobacco, and drugs (including marijuana). If you need help quitting, talk to your doctor.    Drink plenty of liquids.  Be sure to drink enough water. And limit sodas, other sweetened drinks, and caffeine.     Choose foods that are good sources of calcium, iron, and folate.  You can try dairy products, dark leafy greens, fortified orange juice and cereals, almonds, broccoli, dried fruit, and beans.     Avoid foods that may be harmful.  Don't eat raw meat, deli meat, raw seafood, or raw eggs. Avoid soft cheese and unpasteurized dairy, like Brie and blue cheese. And don't eat fish that contains a lot " "of mercury, like shark and swordfish.     Don't touch ankit litter or cat poop.  They can cause an infection that could be harmful during pregnancy.     Avoid things that can make your body too hot.  For example, avoid hot tubs and saunas.     Soothe morning sickness.  Try eating 5 or 6 small meals a day, getting some fresh air, or using deepa to control symptoms.     Ask your doctor about flu and COVID-19 shots.  Getting them can help protect against infection.   Follow-up care is a key part of your treatment and safety. Be sure to make and go to all appointments, and call your doctor if you are having problems. It's also a good idea to know your test results and keep a list of the medicines you take.  Where can you learn more?  Go to https://www.[employer].net/patiented  Enter G112 in the search box to learn more about \"Weeks 6 to 10 of Your Pregnancy: Care Instructions.\"  Current as of: July 10, 2023               Content Version: 14.0    2961-4412 [employer].   Care instructions adapted under license by your healthcare professional. If you have questions about a medical condition or this instruction, always ask your healthcare professional. [employer] disclaims any warranty or liability for your use of this information.         Pregnancy: Managing Morning Sickness (01:48)  Your health professional recommends that you watch this short online health video.  Learn how to manage morning sickness during pregnancy.   Purpose:  Goal: Learn how to manage morning sickness during pregnancy.    Watch: Scan the QR code or visit the link to view video       https://hwi.se/r/[email or web address]  Current as of: July 10, 2023  Content Version: 14.1    6113-5653 [employer].   Care instructions adapted under license by your healthcare professional. If you have questions about a medical condition or this instruction, always ask your healthcare professional. [employer] disclaims " any warranty or liability for your use of this information.    Pregnancy and Heartburn: Care Instructions  Overview     Heartburn is a common problem during pregnancy.  Heartburn happens when stomach acid backs up into the tube that carries food to the stomach. This tube is called the esophagus. Early in pregnancy, heartburn is caused by hormone changes that slow down digestion. Later on, it's also caused by the large uterus pushing up on the stomach.  Even though you can't fix the cause, there are things you can do to get relief. Treating heartburn during pregnancy focuses first on making lifestyle changes, like changing what and how you eat, and on taking medicines.  Heartburn usually improves or goes away after childbirth.  Follow-up care is a key part of your treatment and safety. Be sure to make and go to all appointments, and call your doctor if you are having problems. It's also a good idea to know your test results and keep a list of the medicines you take.  How can you care for yourself at home?  Eat small, frequent meals.  Avoid foods that make your symptoms worse, such as chocolate, peppermint, and spicy foods. Avoid drinks with caffeine, such as coffee, tea, and sodas.  Avoid bending over or lying down after meals.  Take a short walk after you eat.  If heartburn is a problem at night, do not eat for 2 hours before bedtime.  Take antacids like Mylanta, Maalox, Rolaids, or Tums. Do not take antacids that have sodium bicarbonate, magnesium trisilicate, or aspirin. Be careful when you take over-the-counter antacid medicines. Many of these medicines have aspirin in them. While you are pregnant, do not take aspirin or medicines that contain aspirin unless your doctor says it is okay.  If you're not getting relief, talk to your doctor. You may be able to take a stronger acid-reducing medicine.  When should you call for help?   Call your doctor now or seek immediate medical care if:    You have new or worse  "belly pain.     You are vomiting.   Watch closely for changes in your health, and be sure to contact your doctor if:    You have new or worse symptoms of reflux.     You are losing weight.     You have trouble or pain swallowing.     You do not get better as expected.   Where can you learn more?  Go to https://www.[employer].net/patiented  Enter U946 in the search box to learn more about \"Pregnancy and Heartburn: Care Instructions.\"  Current as of: July 10, 2023  Content Version: 14.1 2006-2024 [employer].   Care instructions adapted under license by your healthcare professional. If you have questions about a medical condition or this instruction, always ask your healthcare professional. [employer] disclaims any warranty or liability for your use of this information.    Constipation: Care Instructions  Overview     Constipation means that you have a hard time passing stools (bowel movements). People pass stools from 3 times a day to once every 3 days. What is normal for you may be different. Constipation may occur with pain in the rectum and cramping. The pain may get worse when you try to pass stools. Sometimes there are small amounts of bright red blood on toilet paper or the surface of stools. This is because of enlarged veins near the rectum (hemorrhoids).  A few changes in your diet and lifestyle may help you avoid ongoing constipation. Your doctor may also prescribe medicine to help loosen your stool.  Some medicines can cause constipation. These include pain medicines and antidepressants. Tell your doctor about all the medicines you take. Your doctor may want to make a medicine change to ease your symptoms.  Follow-up care is a key part of your treatment and safety. Be sure to make and go to all appointments, and call your doctor if you are having problems. It's also a good idea to know your test results and keep a list of the medicines you take.  How can you care for yourself " "at home?  Drink plenty of fluids. If you have kidney, heart, or liver disease and have to limit fluids, talk with your doctor before you increase the amount of fluids you drink.  Include high-fiber foods in your diet each day. These include fruits, vegetables, beans, and whole grains.  Get at least 30 minutes of exercise on most days of the week. Walking is a good choice. You also may want to do other activities, such as running, swimming, cycling, or playing tennis or team sports.  Take a fiber supplement, such as Citrucel or Metamucil, every day. Read and follow all instructions on the label.  Schedule time each day for a bowel movement. A daily routine may help. Take your time having a bowel movement, but don't sit for more than 10 minutes at a time. And don't strain too much.  Support your feet with a small step stool when you sit on the toilet. This helps flex your hips and places your pelvis in a squatting position.  Your doctor may recommend an over-the-counter laxative to relieve your constipation. Examples are Milk of Magnesia and MiraLax. Read and follow all instructions on the label. Do not use laxatives on a long-term basis.  When should you call for help?   Call your doctor now or seek immediate medical care if:    You have new or worse belly pain.     You have new or worse nausea or vomiting.     You have blood in your stools.   Watch closely for changes in your health, and be sure to contact your doctor if:    Your constipation is getting worse.     You do not get better as expected.   Where can you learn more?  Go to [email or web address].healthMemento.net/[email or web address]  Enter P343 in the search box to learn more about \"Constipation: Care Instructions.\"  Current as of: October 19, 2023               Content Version: 14.0    7483-3112 [employer].   Care instructions adapted under license by your healthcare professional. If you have questions about a medical condition or this instruction, always ask your " "healthcare professional. [employer] disclaims any warranty or liability for your use of this information.      Learning About High-Iron Foods  What foods are high in iron?     The foods you eat contain nutrients, such as vitamins and minerals. Iron is a nutrient. Your body needs the right amount to stay healthy and work as it should. You can use the list below to help you make choices about which foods to eat.  Here are some foods that contain iron. They have 1 to 2 milligrams of iron per serving.  Fruits  Figs (dried), 5 figs  Vegetables  Asparagus (canned), 6 kuhn  Keke, beet, Swiss chard, or turnip greens, 1 cup  Dried peas, cooked,   cup  Seaweed, spirulina (dried),   cup  Spinach, (cooked)   cup or (raw) 1 cup  Grains  Cereals, fortified with iron, 1 cup  Grits (instant, cooked), fortified with iron,   cup  Meats and other protein foods  Beans (kidney, lima, navy, white), canned or cooked,   cup  Beef or lamb, 3 oz  Chicken giblets, 3 oz  Chickpeas (garbanzo beans),   cup  Liver of beef, lamb, or pork, 3 oz  Oysters (cooked), 3 oz  Sardines (canned), 3 oz  Soybeans (boiled),   cup  Tofu (firm),   cup  Work with your doctor to find out how much of this nutrient you need. Depending on your health, you may need more or less of it in your diet.  Where can you learn more?  Go to https://www.betaworks.net/patiented  Enter R005 in the search box to learn more about \"Learning About High-Iron Foods.\"  Current as of: September 20, 2023  Content Version: 14.1 2006-2024 [employer]   Care instructions adapted under license by your healthcare professional. If you have questions about a medical condition or this instruction, always ask your healthcare professional. [employer] disclaims any warranty or liability for your use of this information.    Rh Antibodies Screening During Pregnancy: About This Test  What is it?     The Rh antibodies screening test is a blood test. It " "checks your blood for Rh antibodies. If you have Rh-negative blood and have been exposed to Rh-positive blood, your immune system may make antibodies to attack the Rh-positive blood. When a pregnant woman has these antibodies, it is called Rh sensitization.  Why is this test done?  The Rh antibodies screening test is done during pregnancy to find out if your baby is at risk for Rh disease. This can happen if you have Rh-negative blood and your baby has Rh-positive blood. If your Rh-negative blood mixes with Rh-positive blood, your immune system will make antibodies to attack the Rh-positive blood.  During pregnancy, these antibodies could attach to the baby's red blood cells. This can cause your baby to have serious health problems. The results of this test will help your doctor know how to best care for you and your baby during your pregnancy.  How do you prepare for the test?  In general, there's nothing you have to do before this test, unless your doctor tells you to.  How is the test done?  A health professional uses a needle to take a blood sample, usually from the arm.  What happens after the test?  You will probably be able to go home right away. It depends on the reason for the test.  You can go back to your usual activities right away.  Follow-up care is a key part of your treatment and safety. Be sure to make and go to all appointments, and call your doctor if you are having problems. It's also a good idea to keep a list of the medicines you take. Ask your doctor when you can expect to have your test results.  Where can you learn more?  Go to https://[email or web address].net/patiented  Enter P722 in the search box to learn more about \"Rh Antibodies Screening During Pregnancy: About This Test.\"  Current as of: July 10, 2023  Content Version: 14.1    2339-6512 [employer].   Care instructions adapted under license by your healthcare professional. If you have questions about a medical condition or this " instruction, always ask your healthcare professional. Healthwise, [facility] disclaims any warranty or liability for your use of this information.    Learning About Preventing Rh Disease  What is Rh disease?     Rh disease can be a serious problem in pregnancy. It happens when substances called antibodies in the mother's blood cause red blood cells in her baby's blood to be destroyed. This can occur when the blood types of a mother and her baby do not match.  All blood has an Rh factor. This is what makes a blood type positive or negative. When you are Rh-negative, your baby may be Rh-negative or Rh-positive. If your baby has Rh-positive blood and it mixes with yours, your body will make antibodies. This is called Rh sensitization.  Most of the time, this is not a problem in a first pregnancy. But in future pregnancies, it could cause Rh disease.  A  with Rh disease has mild anemia and may have jaundice. In severe cases, anemia, jaundice, and swelling can be very dangerous or fatal. Some babies need to be delivered early. Some need special care in the NICU. A very sick baby will need a blood transfusion before or after birth.  Fortunately, Rh sensitization is usually easy to prevent.  That's why it's important to get your Rh status checked in your first trimester. It doesn't cause any warning signs. A blood test is the only way to know if you are Rh-sensitive or are at risk for it.  How can you prevent Rh disease?  If you are Rh-negative, your doctor gives you an Rh immune globulin shot (such as RhoGAM). It helps prevent your body from making the antibodies that attack your baby's red blood cells.  Timing is important. You need the shot at certain times during your pregnancy. And you need one anytime there is a chance that your baby's blood might mix with yours. That can happen with certain prenatal tests or when you have pregnancy bleeding, such as:  Right after any pregnancy loss, amniocentesis, or [facility]  "testing.  After turning of a breech baby.  Before and maybe after childbirth. Your doctor gives you a shot around week 28. If your  is Rh-positive, you will have another shot.  Follow-up care is a key part of your treatment and safety. Be sure to make and go to all appointments, and call your doctor if you are having problems. It's also a good idea to know your test results and keep a list of the medicines you take.  Where can you learn more?  Go to https://www.[employer]hip Science.net/patiented  Enter W177 in the search box to learn more about \"Learning About Preventing Rh Disease.\"  Current as of: July 10, 2023  Content Version: 14. [employer].   Care instructions adapted under license by your healthcare professional. If you have questions about a medical condition or this instruction, always ask your healthcare professional. [employer] disclaims any warranty or liability for your use of this information.    Learning About Rh Immunoglobulin Shots  Introduction     An Rh immunoglobulin shot is given to pregnant women who have Rh-negative blood.  You may have Rh-negative blood, and your baby may have Rh-positive blood. If the two types of blood mix, your body will make antibodies. This is called Rh sensitization. Most of the time, this is not a problem the first time you're pregnant. But it could cause problems in future pregnancies.  This shot keeps your body from making the antibodies. You get the shot around 28 weeks of pregnancy. After the birth, your baby's blood is tested. If the blood is Rh positive, you will get another shot. You may also get the shot if you have vaginal bleeding while you are pregnant or if you have a miscarriage. These shots protect future pregnancies.  Women with Rh negative blood will need this shot each time they get pregnant.  Example  Rh immunoglobulin (HypRho-D, MICRhoGAM, and RhoGAM)  Possible side effects  Rare side effects may include:  Some " "mild pain where you got the shot.  A slight fever.  An allergic reaction.  You may have other side effects not listed here. Check the information that comes with your medicine.  What to know about taking this medicine  You may need more than one shot. You may need the shot again:  After amniocentesis, fetal blood sampling, or chorionic villus sampling tests.  If you have bleeding in your second or third trimester.  After turning of a breech baby.  After an injury to the belly while you are pregnant.  After a miscarriage or an .  Before or right after treatment for an ectopic or a partial molar pregnancy.  Tell your doctor if you have any allergies or have had a bad response to medicines in the past.  If you get this shot within 3 months of getting a live-virus vaccine, the vaccine may not work. Your doctor will tell you if you need more vaccine.  Check with your doctor or pharmacist before you use any other medicines. This includes over-the-counter medicines. Make sure your doctor knows all of the medicines, vitamins, herbs, and supplements you take. Taking some medicines at the same time can cause problems.  Where can you learn more?  Go to https://www.DoublePositive.net/patiented  Enter V615 in the search box to learn more about \"Learning About Rh Immunoglobulin Shots.\"  Current as of: July 10, 2023               Content Version: 14.0    6584-1635 [employer].   Care instructions adapted under license by your healthcare professional. If you have questions about a medical condition or this instruction, always ask your healthcare professional. [employer] disclaims any warranty or liability for your use of this information.      Rubella ([place] Measles): Care Instructions  Overview  Rubella, also called [place] measles or 3-day measles, is a disease caused by a virus. It spreads by coughs, sneezes, and close contact. Rubella usually is mild and does not cause long-term problems. But if " you are pregnant and get it, you can give the disease to your unborn baby. This can cause serious birth defects.  While you have rubella, you may get a rash and a mild fever, and the lymph glands in your neck may swell. Older children often have a fever, eye pain, a sore throat, and body aches. You can relieve most symptoms with care at home. Avoid being around others, especially pregnant people, until your rash has been gone for at least 4 days. People who have not had this disease before or have not had the vaccine have the greatest chance of getting the virus.  Follow-up care is a key part of your treatment and safety. Be sure to make and go to all appointments, and call your doctor if you are having problems. It's also a good idea to know your test results and keep a list of the medicines you take.  How can you care for yourself at home?  Drink plenty of fluids. If you have kidney, heart, or liver disease and have to limit fluids, talk with your doctor before you increase the amount of fluids you drink.  Get plenty of rest to help your body heal.  Take an over-the-counter pain medicine, such as acetaminophen (Tylenol), ibuprofen (Advil, Motrin), or naproxen (Aleve), to reduce fever and discomfort. Read and follow all instructions on the label. Do not give aspirin to anyone younger than 20. It has been linked to Reye syndrome, a serious illness.  Do not take two or more pain medicines at the same time unless the doctor told you to. Many pain medicines have acetaminophen, which is Tylenol. Too much acetaminophen (Tylenol) can be harmful.  Try not to scratch the rash. Put cold, wet cloths on the rash to reduce itching.  Do not smoke. Smoking can make your symptoms worse. If you need help quitting, talk to your doctor about stop-smoking programs and medicines. These can increase your chances of quitting for good.  Avoid contact with people who have never had rubella and who have not been immunized.  When should you  "call for help?   Call your doctor now or seek immediate medical care if:    You have a fever with a stiff neck or a severe headache.     You are sensitive to light or feel very sleepy or confused.   Watch closely for changes in your health, and be sure to contact your doctor if:    You do not get better as expected.   Where can you learn more?  Go to https://[email or web address].[email or web address]/patiented  Enter B812 in the search box to learn more about \"Rubella ([place]ux[place]bourger Measles): Care Instructions.\"  Current as of: 2023               Content Version: 14.0    4470-0193 [employer].   Care instructions adapted under license by your healthcare professional. If you have questions about a medical condition or this instruction, always ask your healthcare professional. [employer] disclaims any warranty or liability for your use of this information.      Gonorrhea and Chlamydia: About These Tests  What is it?  These tests use a sample of urine or other body fluid to look for the bacteria that cause these sexually transmitted infections (STIs). The fluid sample can come from the cervix, vagina, rectum, throat, or eyes.  Why is this test done?  These tests may be done to:  Find out if symptoms are caused by gonorrhea or chlamydia.  Check people who are at high risk of being infected with gonorrhea or chlamydia.  Retest people several months after they have been treated for gonorrhea or chlamydia.  Check for infection in your  if you had a gonorrhea or chlamydia infection at the time of delivery.  How can you prepare for the test?  If you are going to have a urine test, do not urinate for at least 1 hour before the test.  If you think you may have chlamydia or gonorrhea, don't have sexual intercourse until you get your test results. And you may want to have tests for other STIs, such as HIV.  How is the test done?  For a direct sample, a swab is used to collect body fluid from the cervix, vagina, " "rectum, throat, or eyes. Your doctor may collect the sample. Or you may be given instructions on how to collect your own sample.  For a urine sample, you will collect the urine that comes out when you first start to urinate. Don't wipe the genital area clean before you urinate.  How long does the test take?  The test will take a few minutes.  What happens after the test?  You will be able to go home right away.  You can go back to your usual activities right away.  If you do have an infection, don't have sexual intercourse for 7 days after you start treatment. And your sex partner(s) should also be treated.  Follow-up care is a key part of your treatment and safety. Be sure to make and go to all appointments, and call your doctor if you are having problems. It's also a good idea to keep a list of the medicines you take. Ask your doctor when you can expect to have your test results.  Where can you learn more?  Go to https://[email or web address] [email or web address] [email or web address]/[email or web address]  Enter K976 in the search box to learn more about \"Gonorrhea and Chlamydia: About These Tests.\"  Current as of: November 27, 2023  Content Version: 14.1    5438-7118 [employer].   Care instructions adapted under license by your healthcare professional. If you have questions about a medical condition or this instruction, always ask your healthcare professional. [employer] disclaims any warranty or liability for your use of this information.    Trichomoniasis: About This Test  What is it?     This test uses a sample of urine or other body fluid to look for the tiny parasite that causes trichomoniasis (also called trich). The fluid sample can come from the vagina, cervix, or urethra. Your doctor may choose to use one or more of many available tests.  Why is it done?  A trich test may be done to:  Find out if symptoms are caused by trich.  Check people who are at high risk for being infected with trich.  Check after treatment to make sure that " the infection is gone.  How do you prepare for the test?  If you are going to have a urine test, do not urinate for at least 1 hour before the test.  How is the test done?  For a direct sample, a swab is used to collect body fluid from the cervix, vagina, or urethra. Your doctor may collect the sample. Or you may be given instructions on how to collect your own sample.  For a urine sample, you will collect the urine that comes out when you first start to urinate. Don't wipe the area clean before you urinate.  How long does the test take?  It will take a few minutes to collect a sample.  What happens after the test?  You can go home right away.  You can go back to your usual activities right away.  You may get the test results the same day or several days later. It depends on the test used.  If you do have an infection, don't have sexual intercourse for 7 days after you start treatment. Your sex partner or partners should also be treated.  Follow-up care is a key part of your treatment and safety. Be sure to make and go to all appointments, and call your doctor if you are having problems. Ask your doctor when you can expect to have your test results.  Current as of: November 27, 2023  Content Version: 14.1    6251-0679 [email or web address].   Care instructions adapted under license by your healthcare professional. If you have questions about a medical condition or this instruction, always ask your healthcare professional. [email or web address] disclaims any warranty or liability for your use of this information.    HIV Testing: Care Instructions  Overview  You can get tested for the human immunodeficiency virus (HIV). Most doctors use a blood test to check for HIV antibodies and antigens in your blood. It may also check for the genetic material (RNA) of HIV. Some tests use saliva to check for HIV antibodies. But these aren't as accurate. For example, they may give a false result if you've just been  "infected.  What do the results mean?    Normal (negative)    No HIV antibodies, antigens, or RNA were found.  You may need more testing. It can make sure your test results are correct.    Uncertain (indeterminate)    Test results didn't clearly show if you have an HIV infection.  HIV antibodies or antigens may not have formed yet.  Some other type of antibody or antigen may have affected the results.  You will need another test to be sure.    Abnormal (positive)    HIV antibodies, antigens, or RNA were found.  If you haven't had an RNA test yet, one will be done. If it's positive, you have HIV.  If your test result is positive, your doctor will talk to you. You will discuss starting treatment.  Follow-up care is a key part of your treatment and safety. Be sure to make and go to all appointments, and call your doctor if you are having problems. It's also a good idea to know your test results and keep a list of the medicines you take.  Where can you learn more?  Go to https://www.I[employer] Entertainment.net/patiented  Enter T792 in the search box to learn more about \"HIV Testing: Care Instructions.\"  Current as of: June 12, 2023               Content Version: 14.0    6993-5321 [employer].   Care instructions adapted under license by your healthcare professional. If you have questions about a medical condition or this instruction, always ask your healthcare professional. [employer] disclaims any warranty or liability for your use of this information.      Hepatitis C Virus Tests: About These Tests  What are they?     Hepatitis C virus tests are blood tests that check for substances in the blood that show whether you have hepatitis C now or had it in the past. The tests can also tell you what type of hepatitis C you have and how severe the disease is. This can help your doctor with treatment.  If the tests show that you have long-term hepatitis C, you need to take steps to prevent spreading the " "disease.  Why are these tests done?  You may need these tests if:  You have symptoms of hepatitis.  You may have been exposed to the virus. You are more likely to have been exposed to the virus if you inject drugs or are exposed to body fluids (such as if you are a health care worker).  You've had other tests that show you have liver problems.  You are 18 to 79 years old.  You have an HIV infection.  The tests also are done to help your doctor decide about your treatment and see how well it works.  How do you prepare for the test?  In general, there's nothing you have to do before this test, unless your doctor tells you to.  How is the test done?  A health professional uses a needle to take a blood sample, usually from the arm.  What happens after these tests?  You will probably be able to go home right away.  You can go back to your usual activities right away.  Follow-up care is a key part of your treatment and safety. Be sure to make and go to all appointments, and call your doctor if you are having problems. It's also a good idea to keep a list of the medicines you take. Ask your doctor when you can expect to have your test results.  Where can you learn more?  Go to https://www.[employer].net/patiented  Enter W551 in the search box to learn more about \"Hepatitis C Virus Tests: About These Tests.\"  Current as of: June 12, 2023               Content Version: 14.0    6275-2858 [employer].   Care instructions adapted under license by your healthcare professional. If you have questions about a medical condition or this instruction, always ask your healthcare professional. [employer] disclaims any warranty or liability for your use of this information.      Learning About Fetal Ultrasound Results  What is a fetal ultrasound?     Fetal ultrasound is a test that lets your doctor see an image of your baby. Your doctor learns information about your baby from this picture. You may find out, for " example, if you are having a boy or a girl. But the main reason you have this test is to get information about your baby's health.  (You may hear your baby called a fetus. This is a common medical term for a baby that's growing in the mother's uterus.)  What kind of information can you learn from this test?  The findings of an ultrasound fall into two categories, normal and abnormal.  Normal  The fetus is the right size for its age.  The placenta is the expected size and does not cover the cervix.  There is enough amniotic fluid in the uterus.  No birth defects can be seen.  Abnormal  The fetus is small or large for its age.  The placenta covers the cervix.  There is too much or too little amniotic fluid in the uterus.  The fetus may have a birth defect.  What does an abnormal result mean?  Abnormal seems to imply that something is wrong with your baby. But what it means is that the test has shown something the doctor wants to take a closer look at.  And that's what happens next. Your doctor will talk to you about what further test or tests you may need.  What do the results mean?  Some of the things your doctor may see on an abnormal ultrasound include:  Echogenic bowel.  The bowel looks very bright on the screen. This could mean that there's blood in the bowel. Or it could mean that something is blocking the small bowel.  Increased nuchal translucency.  The ultrasound measures the thickness at the back of the baby's neck. An increase in thickness is sometimes an early sign of Down syndrome.  Increased or decreased amniotic fluid.  The doctor will look for a reason for the level of amniotic fluid and will watch the pregnancy closely as it progresses.  Large ventricles.  Ventricles in the brain look larger than they should. Your doctor may take a closer look at the brain.  Renal pyelectasis/hydronephrosis.  The ultrasound measures the fluid around the kidney. If there is more fluid than expected, there is a chance  "of urinary tract or kidney problems.  Short long bones.  The ultrasound measures certain arm and leg bones. A long bone (humerus or femur) that is shorter than average could be a sign of Down syndrome.  Subchorionic hemorrhage.  An ultrasound can show bleeding under one of the membranes that surrounds the fetus. Some women don't have symptoms of bleeding. The ultrasound can find this problem when women are not bleeding from their vagina. Women who have this condition have a slightly higher chance of miscarriage.  What do you do now?  Take a deep breath, and let it out. Keep in mind that an abnormal finding on an ultrasound, after it's coupled with more information, may:  Turn out to be nothing.  Turn out to be something mild that won't affect the baby.  Turn out to be something more serious. But if this happens, early diagnosis helps you and your doctor plan treatment options sooner rather than later.  Your medical team is there for you. So are your family and friends. Ask questions, and get the help and support you need.  Follow-up care is a key part of your treatment and safety. Be sure to make and go to all appointments, and call your doctor if you are having problems. It's also a good idea to know your test results and keep a list of the medicines you take.  Where can you learn more?  Go to https://www.[employer].net/patiented  Enter K451 in the search box to learn more about \"Learning About Fetal Ultrasound Results.\"  Current as of: July 10, 2023  Content Version: 14.1 2006-2024 [employer].   Care instructions adapted under license by your healthcare professional. If you have questions about a medical condition or this instruction, always ask your healthcare professional. [employer] disclaims any warranty or liability for your use of this information.    Learning About Prenatal Visits  Overview     Regular prenatal visits are very important during any pregnancy. These quick office " visits may seem simple and routine. But they can help you have a safe and healthy pregnancy. Your doctor is watching for problems that can only be found through regular checkups. The visits also give you and your doctor time to build a good relationship.  After your first visit, you will most likely start on a schedule of monthly visits. In your third trimester, the visits will get more frequent. Based on your health, your age, and if you've had a normal, full-term pregnancy before, your doctor may want to see you more or less often.  At different times in your pregnancy, you will have exams and tests. Some are routine. Others are done only when there is a chance of a problem. Everything healthy you do for your body helps you have a healthy pregnancy. Rest when you need it. Eat well, drink plenty of water, and exercise regularly.  What happens during a prenatal visit?  You will have blood pressure checks, along with urine tests. You also may have blood tests. If you need to go to the bathroom while waiting for the doctor, tell the nurse. You will be given a sample cup so your urine can be tested.  You will be weighed and have your belly measured.  Your doctor may listen to the fetal heartbeat with a special device.  At about 24 weeks, and possibly earlier in your pregnancy, your doctor will check your blood sugar (glucose tolerance test) for diabetes that can occur during pregnancy. This is gestational diabetes, which can be harmful.  You will have tests to check for infections that could harm your . These include group B streptococcus and hepatitis B.  Your doctor may do ultrasounds to check for problems. This also checks the position of the fetus. An ultrasound uses sound waves to produce a picture of the fetus.  You may get your vaccines updated.  Your doctor may ask you questions to check for signs of anxiety or depression. Tell your doctor if you feel sad, anxious, or hopeless for more than a few  "days.  You may have other tests at any time during your pregnancy.  Use your visits to discuss with your doctor any concerns you have.  How can you care for yourself at home?  Get plenty of rest.  Try to exercise every day, if your doctor says it is okay. If you have not exercised in the past, start out slowly. For example, you can take short walks each day.  Choose healthy foods, such as fruits, vegetables, whole grains, lean proteins, low-fat dairy, and healthy fats.  Drink plenty of fluids. Cut down on drinks with caffeine, such as coffee, tea, and cola. If you have kidney, heart, or liver disease and have to limit fluids, talk with your doctor before you increase the amount of fluids you drink.  Try to avoid chemical fumes, paint fumes, and poisons.  If you smoke, vape, or use alcohol, marijuana, or other drugs, quit or cut back as much as you can. Talk to your doctor if you need help quitting.  Review all of your medicines, including over-the-counter medicines and supplements, with your doctor. Some of your routine medicines may need to be changed. Do not stop or start taking any medicines without talking to your doctor first.  Follow-up care is a key part of your treatment and safety. Be sure to make and go to all appointments, and call your doctor if you are having problems. It's also a good idea to know your test results and keep a list of the medicines you take.  Where can you learn more?  Go to https://www.[employer].net/patiented  Enter J502 in the search box to learn more about \"Learning About Prenatal Visits.\"  Current as of: July 10, 2023               Content Version: 14.0    5672-8634 [employer].   Care instructions adapted under license by your healthcare professional. If you have questions about a medical condition or this instruction, always ask your healthcare professional. [employer] disclaims any warranty or liability for your use of this information.      Intimate " Partner Violence: Care Instructions  Overview     If you want to save this information but don't think it is safe to take it home, see if a trusted friend can keep it for you. Plan ahead. Know who you can call for help, and memorize the phone number.   Be careful online too. Your online activity may be seen by others. Do not use your personal computer or device to read about this topic. Use a safe computer, such as one at work, a friend's home, or a library.    Intimate partner violence--a type of domestic abuse--is different from an argument now and then. It is a pattern of abuse that one person may use to control another person's behavior. It may start with threats and name-calling. Then, it may lead to more serious acts, like pushing and slapping. The abuse also may occur in other areas. For example, the abuser may withhold money or spend a partner's money without their knowledge.  Abuse can cause serious harm. You are more likely to have a long-term health problem from the injuries and stress of living in a violent relationship. People who are sexually abused by their partners have more sexually transmitted infections and unplanned pregnancies. Anyone who is abused also faces emotional pain. Anyone can be abused in relationships. In some relationships, both people use abusive behavior.  If you are pregnant, abuse can cause problems such as poor weight gain, infections, and bleeding. Abuse during this time may increase your baby's risk of low birth weight, premature birth, and death.  Follow-up care is a key part of your treatment and safety. Be sure to make and go to all appointments, and call your doctor if you are having problems. It's also a good idea to know your test results and keep a list of the medicines you take.  How can you care for yourself at home?  If you do not have a safe place to stay, discuss this with your doctor before you leave.  Have a plan for where to go, how to leave your home, and where  "to stay in case of an emergency. Do not tell your partner about your plan. Contact:  The National Domestic Violence Hotline toll-free at 1-479.688.1870. They can help you find resources in your area.  Your local police department, hospital, or clinic for information about shelters and safe homes near you.  Talk to a trusted friend or neighbor, a counselor, or a marcos leader. Do not feel that you have to hide what happened.  Teach your children how to call for help in an emergency.  Be alert to warning signs, such as threats, heavy alcohol use, or drug use. This can help you avoid danger.  If you can, make sure that there are no guns or other weapons in your home.  When should you call for help?   Call 911 anytime you think you may need emergency care. For example, call if:    You or someone else has just been abused.     You think you or someone else is in danger of being abused.   Watch closely for changes in your health, and be sure to contact your doctor if you have any problems.  Where can you learn more?  Go to https://www.[employer].net/patiented  Enter G282 in the search box to learn more about \"Intimate Partner Violence: Care Instructions.\"  Current as of: June 24, 2023               Content Version: 14.0    5995-6656 [employer].   Care instructions adapted under license by your healthcare professional. If you have questions about a medical condition or this instruction, always ask your healthcare professional. [employer] disclaims any warranty or liability for your use of this information.      Intimate Partner Violence Safety Instructions: Care Instructions  Overview     If you want to save this information but don't think it is safe to take it home, see if a trusted friend can keep it for you. Plan ahead. Know who you can call for help, and memorize the phone number.   Be careful online too. Your online activity may be seen by others. Do not use your personal computer or device " to read about this topic. Use a safe computer, such as one at work, a friend's home, or a library.    When you are abused by a spouse or partner, you can take actions to protect yourself and your children.  You can increase your safety whether you decide to stay with your spouse or partner or you decide to leave. You may want to make a safety plan and pack a bag ahead of time. This will help you leave quickly when you decide to. Remember, you cannot change your partner's actions, but you can find help for you and your children. No one deserves to be abused.  Follow-up care is a key part of your treatment and safety. Be sure to make and go to all appointments, and call your doctor if you are having problems. It's also a good idea to know your test results and keep a list of the medicines you take.  How can you care for yourself at home?  Make a plan for your safety   If you decide to stay with your abusive spouse or partner, you can do the following to increase your safety:  Decide what works best to keep you safe in an emergency.  Know who you can call to help you in an emergency.  Decide if you will call the police if you get hurt again. If you can, agree on a signal with your children or neighbor to call the police for you if you need help. You can flash lights or hang something out of a window.  Choose a safe place to go for a short time if you need to leave home. Memorize the address and phone number.  Learn escape routes out of your home in case you need to leave in a hurry. Teach your children different ways to get out of your home quickly if they need to.  If you can, hide or lock up things that can be used as weapons (guns, knives, hammers).  Learn the number of a domestic violence shelter. Talk to the people there about how they can help.  Find out about other community resources that can help you.  Take pictures of bruises or other injuries if you can. You can also take pictures of things your abuser has  broken.  Teach your children that violence is never okay. Tell them that they do not deserve to be hurt.  Pack a bag   Prepare a bag with things you will need if you leave suddenly. Leave it with a friend, a relative, or someone else you trust. You could include the following items in the bag:  A set of keys to your home and car.  Emergency phone numbers and addresses.  Money such as cash or checks. You can also ask a friend, a relative, or someone else you trust to hold money for you.  Copies of legal documents such as house and car titles or rent receipts, birth certificates, Social Security card, voter registration, marriage and 's licenses, and your children's health records.  Personal items you would need for a few days, such as clothes, a toothbrush, toothpaste, and any medicines you or your children need.  A favorite toy or book for your child or children.  Diapers and bottles, if you have very young children.  Pictures that show signs of abuse and violence. You may also add pictures of your abuser.  If you leave   If you decide to leave, you can take the following steps:  Go to the emergency room at a hospital if you have been hurt.  Think about asking the police to be with you as you leave. They can protect you as you leave your home.  If you decide to leave secretly, remember that activities can be tracked. Your abuser may still have access to your cell phone, email, and credit cards. It may be possible for these to be traced. Always be aware of your surroundings.  If this is an emergency, do not worry about gathering up anything. Just leave--your safety is most important.  If your abuser moves out, change the locks on the doors. If you have a security system, change the access code.  When should you call for help?   Call 911 anytime you think you may need emergency care. For example, call if:    You or someone else has just been abused.     You think you or someone else is in danger of being abused.  "  Watch closely for changes in your health, and be sure to contact your doctor if you have any problems.  Where can you learn more?  Go to https://www.[employer]ics.net/patiented  Enter A752 in the search box to learn more about \"Intimate Partner Violence Safety Instructions: Care Instructions.\"  Current as of: June 24, 2023               Content Version: 14.0    4489-3345 [employer]   Care instructions adapted under license by your healthcare professional. If you have questions about a medical condition or this instruction, always ask your healthcare professional. "[employer]" disclaims any warranty or liability for your use of this information.      Learning About Intimate Partner Violence  What is intimate partner violence?  Intimate partner violence is a type of domestic abuse. It's threatening, emotionally harmful, or violent behavior in a personal relationship. It can happen between past or current partners or spouses. In some relationships both people abuse each other. One partner may be more abusive. Or the abuse may be equal.  Abuse can affect people of any ethnic group, race, or Hinduism. It can affect teens, adults, or the elderly. And it can happen to people of any sexual orientation, gender, or social status.  Abusers use fear, bullying, and threats to control their partners. They may control what their partners do. They may control where their partners go or who they see. They may act jealous, controlling, or possessive. These early signs of abuse may happen soon after the start of the relationship. Sometimes it can be hard to notice abuse at first. But after the relationship becomes more serious, the abuse may get worse.  If you are being abused in your relationship, it's important to get help. The abuse is not your fault. You don't have to face it alone.  Be careful  It may not be safe to take home domestic abuse information like this handout. Some people ask a trusted friend " to keep it for them. It's also important to plan ahead and to memorize the phone number of places you can go for help. If you are concerned about your safety, do not use your computer, smartphone, or tablet to read about domestic abuse.   What are the types of intimate partner violence?  Abuse can happen in different ways. Each type can happen on its own or in combination with others.  Emotional abuse  Emotional abuse is a pattern of threats, insults, or controlling behavior. It includes verbal abuse. It goes beyond healthy disagreements in a relationship. It's a sign of an unhealthy relationship.  Do you feel threatened, intimidated, or controlled?  Does your partner:  Threaten your children, other family members, or pets?  Use jokes meant to embarrass or shame you?  Call you names?  Tell you that you are a bad parent?  Threaten to take away your children?  Threaten to have you or your family members deported?  Control your access to money or other basic needs?  Control what you do, who you see or talk to, or where you go?  Another form of emotional abuse is denying that it is happening. Or the abuser may act like the abuse is no big deal or is your fault.  Sexual abuse  With sexual abuse, abusers may try to convince or force you to have sex. They may force you into sex acts you're not comfortable with. Or they may sexually assault you. Sexual abuse can happen even if you are in a committed relationship.  Physical abuse  Physical abuse means that a partner hits, kicks, or does something else to physically hurt you. Physical abuse that starts with a slap might lead to kicking, shoving, and choking over time. The abuser may also threaten to hurt or kill you.  Stalking  Stalking means that an abuser gives you attention that you do not want and that causes you fear. Examples of stalking include:  Following you.  Showing up at places where the abuser isn't invited, such as at your work or school.  Constantly calling or  texting you.  What problems can  to?  Intimate partner violence can be very dangerous. It can cause serious, repeated injury. It can even lead to death.  All forms of abuse can cause long-term health problems from the stress of a violent relationship. Verbal abuse can lead to sexual and physical abuse.  Abuse causes:  Emotional pain.  Depression.  Anxiety.  Post-traumatic stress.  Sexual abuse can lead to sexually transmitted infections (such as HIV/AIDS) and unplanned pregnancy.  Pregnancy can be a very dangerous time for people in abusive relationships. Abuse can cause or increase the risk of problems during pregnancy. These include low weight gain, anemia, infections, and bleeding. Abuse may also increase your baby's risk of low birth weight, premature birth, and death.  It can be hard for some victims of abuse to ask for help or to leave their relationship. You may feel scared, stuck, or not sure what steps to take. But it's important not to ignore abuse. Talking to someone you trust could be the first step to ending the abuse and taking care of your own health and happiness again. There are resources available that can help keep you safe.  Where can you get help?  Talk to a trusted friend. Find a local advocacy group, or talk to your doctor about the abuse.  Contact the National Domestic Violence Hotline at 8-125-167-PSSS (1-908.757.1985) for more safety tips. They can guide you to groups in your area that can help. Or go to the National Coalition Against Domestic Violence website at www.thehotline.org to learn more.  Domestic violence groups or a counselor in your area can help you make a safety plan for yourself and your children.  When to call for help  Call 911 anytime you think you may need emergency care. For example, call if:  You think that you or someone you know is in danger of being abused.  You have been hurt and can't have someone safely take you to emergency care.  You have just been  "abused.  A family member has just been abused.  Where can you learn more?  Go to https://www.[employer].net/patiented  Enter S665 in the search box to learn more about \"Learning About Intimate Partner Violence.\"  Current as of: June 24, 2023  Content Version: 14.1 2006-2024 [employer].   Care instructions adapted under license by your healthcare professional. If you have questions about a medical condition or this instruction, always ask your healthcare professional. [employer] disclaims any warranty or liability for your use of this information.    Vaginal Bleeding During Pregnancy: Care Instructions  Overview     It's common to have some vaginal spotting when you are pregnant. In some cases, the bleeding isn't serious. And there aren't any more problems with the pregnancy.  But sometimes bleeding is a sign of a more serious problem. This is more common if the bleeding is heavy or painful. Examples of more serious problems include miscarriage, an ectopic pregnancy, and a problem with the placenta.  You may have to see your doctor again to be sure everything is okay. You may also need more tests to find the cause of the bleeding.  Home treatment may be all you need. But it depends on what is causing the bleeding. Be sure to tell your doctor if you have any new symptoms or if your symptoms get worse.  The doctor has checked you carefully, but problems can develop later. If you notice any problems or new symptoms, get medical treatment right away.  Follow-up care is a key part of your treatment and safety. Be sure to make and go to all appointments, and call your doctor if you are having problems. It's also a good idea to know your test results and keep a list of the medicines you take.  How can you care for yourself at home?  If your doctor prescribed medicines, take them exactly as directed. Call your doctor if you think you are having a problem with your medicine.  Do not have vaginal " "sex until your doctor says it's okay.  Do not put anything in your vagina until your doctor says it's okay.  Ask your doctor about other activities you can or can't do.  Get a lot of rest. Being pregnant can make you tired.  Do not use nonsteroidal anti-inflammatory drugs (NSAIDs), such as ibuprofen (Advil, Motrin), naproxen (Aleve), or aspirin, unless your doctor says it is okay.  When should you call for help?   Call 911 anytime you think you may need emergency care. For example, call if:    You passed out (lost consciousness).     You have severe vaginal bleeding. This means you are soaking through a pad each hour for 2 or more hours.     You have sudden, severe pain in your belly or pelvis.   Call your doctor now or seek immediate medical care if:    You have new or worse vaginal bleeding.     You are dizzy or lightheaded, or you feel like you may faint.     You have pain in your belly, pelvis, or lower back.     You think that you are in labor.     You have a sudden release of fluid from your vagina.     You've been having regular contractions for an hour. This means that you've had at least 8 contractions within 1 hour or at least 4 contractions within 20 minutes, even after you change your position and drink fluids.     You notice that your baby has stopped moving or is moving much less than normal.   Watch closely for changes in your health, and be sure to contact your doctor if you have any problems.  Where can you learn more?  Go to https://www.[employer].net/patiented  Enter N829 in the search box to learn more about \"Vaginal Bleeding During Pregnancy: Care Instructions.\"  Current as of: July 10, 2023               Content Version: 14.0    6118-6466 [employer].   Care instructions adapted under license by your healthcare professional. If you have questions about a medical condition or this instruction, always ask your healthcare professional. [employer] disclaims any warranty " or liability for your use of this information.

## 2024-09-11 ENCOUNTER — TRANSCRIBE ORDERS (OUTPATIENT)
Dept: MATERNAL FETAL MEDICINE | Facility: CLINIC | Age: 29
End: 2024-09-11
Payer: COMMERCIAL

## 2024-09-11 ENCOUNTER — PRENATAL OFFICE VISIT (OUTPATIENT)
Dept: OBGYN | Facility: CLINIC | Age: 29
End: 2024-09-11
Payer: COMMERCIAL

## 2024-09-11 VITALS
BODY MASS INDEX: 29.1 KG/M2 | DIASTOLIC BLOOD PRESSURE: 77 MMHG | HEIGHT: 68 IN | HEART RATE: 76 BPM | WEIGHT: 192 LBS | SYSTOLIC BLOOD PRESSURE: 109 MMHG

## 2024-09-11 DIAGNOSIS — N89.8 VAGINAL ODOR: ICD-10-CM

## 2024-09-11 DIAGNOSIS — Z34.81 ENCOUNTER FOR SUPERVISION OF OTHER NORMAL PREGNANCY IN FIRST TRIMESTER: Primary | ICD-10-CM

## 2024-09-11 DIAGNOSIS — O26.90 PREGNANCY RELATED CONDITION, ANTEPARTUM: Primary | ICD-10-CM

## 2024-09-11 DIAGNOSIS — F43.23 ADJUSTMENT DISORDER WITH MIXED ANXIETY AND DEPRESSED MOOD: ICD-10-CM

## 2024-09-11 LAB
BACTERIAL VAGINOSIS VAG-IMP: POSITIVE
CANDIDA DNA VAG QL NAA+PROBE: DETECTED
CANDIDA GLABRATA / CANDIDA KRUSEI DNA: NOT DETECTED
ERYTHROCYTE [DISTWIDTH] IN BLOOD BY AUTOMATED COUNT: 13.2 % (ref 10–15)
HBA1C MFR BLD: 4.3 %
HBV SURFACE AB SERPL IA-ACNC: <3.5 M[IU]/ML
HBV SURFACE AB SERPL IA-ACNC: NONREACTIVE M[IU]/ML
HBV SURFACE AG SERPL QL IA: NONREACTIVE
HCT VFR BLD AUTO: 33.1 % (ref 35–47)
HCV AB SERPL QL IA: NONREACTIVE
HGB BLD-MCNC: 11.1 G/DL (ref 11.7–15.7)
HIV 1+2 AB+HIV1 P24 AG SERPL QL IA: NONREACTIVE
MCH RBC QN AUTO: 32 PG (ref 26.5–33)
MCHC RBC AUTO-ENTMCNC: 33.5 G/DL (ref 31.5–36.5)
MCV RBC AUTO: 95 FL (ref 78–100)
PLATELET # BLD AUTO: 362 10E3/UL (ref 150–450)
RBC # BLD AUTO: 3.47 10E6/UL (ref 3.8–5.2)
RUBV IGG SERPL QL IA: 25.9 INDEX
RUBV IGG SERPL QL IA: POSITIVE
T PALLIDUM AB SER QL: NONREACTIVE
T VAGINALIS DNA VAG QL NAA+PROBE: NOT DETECTED
VZV IGG SER QL IA: 2150 INDEX
VZV IGG SER QL IA: POSITIVE
WBC # BLD AUTO: 9.1 10E3/UL (ref 4–11)

## 2024-09-11 PROCEDURE — 87340 HEPATITIS B SURFACE AG IA: CPT

## 2024-09-11 PROCEDURE — 83036 HEMOGLOBIN GLYCOSYLATED A1C: CPT

## 2024-09-11 PROCEDURE — 87086 URINE CULTURE/COLONY COUNT: CPT

## 2024-09-11 PROCEDURE — 0352U MULTIPLEX VAGINAL PANEL BY PCR: CPT

## 2024-09-11 PROCEDURE — 85027 COMPLETE CBC AUTOMATED: CPT

## 2024-09-11 PROCEDURE — 87491 CHLMYD TRACH DNA AMP PROBE: CPT

## 2024-09-11 PROCEDURE — 86762 RUBELLA ANTIBODY: CPT

## 2024-09-11 PROCEDURE — 86900 BLOOD TYPING SEROLOGIC ABO: CPT

## 2024-09-11 PROCEDURE — 99207 PR PRENATAL VISIT: CPT | Mod: GC | Performed by: OBSTETRICS & GYNECOLOGY

## 2024-09-11 PROCEDURE — 87591 N.GONORRHOEAE DNA AMP PROB: CPT

## 2024-09-11 PROCEDURE — 86780 TREPONEMA PALLIDUM: CPT

## 2024-09-11 PROCEDURE — 86803 HEPATITIS C AB TEST: CPT

## 2024-09-11 PROCEDURE — 36415 COLL VENOUS BLD VENIPUNCTURE: CPT

## 2024-09-11 PROCEDURE — G0463 HOSPITAL OUTPT CLINIC VISIT: HCPCS

## 2024-09-11 PROCEDURE — 87389 HIV-1 AG W/HIV-1&-2 AB AG IA: CPT

## 2024-09-11 PROCEDURE — 86787 VARICELLA-ZOSTER ANTIBODY: CPT

## 2024-09-11 PROCEDURE — 86706 HEP B SURFACE ANTIBODY: CPT

## 2024-09-12 LAB
BACTERIA UR CULT: NO GROWTH
C TRACH DNA SPEC QL NAA+PROBE: NEGATIVE
N GONORRHOEA DNA SPEC QL NAA+PROBE: NEGATIVE

## 2024-09-23 ENCOUNTER — PRE VISIT (OUTPATIENT)
Dept: MATERNAL FETAL MEDICINE | Facility: CLINIC | Age: 29
End: 2024-09-23
Payer: COMMERCIAL

## 2024-09-23 DIAGNOSIS — N76.0 BV (BACTERIAL VAGINOSIS): Primary | ICD-10-CM

## 2024-09-23 DIAGNOSIS — B37.31 YEAST INFECTION OF THE VAGINA: ICD-10-CM

## 2024-09-23 DIAGNOSIS — B96.89 BV (BACTERIAL VAGINOSIS): Primary | ICD-10-CM

## 2024-09-23 RX ORDER — METRONIDAZOLE 500 MG/1
500 TABLET ORAL 2 TIMES DAILY
Qty: 14 TABLET | Refills: 0 | Status: SHIPPED | OUTPATIENT
Start: 2024-09-23 | End: 2024-09-30

## 2024-09-23 RX ORDER — FLUCONAZOLE 150 MG/1
150 TABLET ORAL ONCE
Qty: 1 TABLET | Refills: 0 | Status: SHIPPED | OUTPATIENT
Start: 2024-09-23 | End: 2024-09-23

## 2024-09-24 ENCOUNTER — MEDICAL CORRESPONDENCE (OUTPATIENT)
Dept: HEALTH INFORMATION MANAGEMENT | Facility: CLINIC | Age: 29
End: 2024-09-24

## 2024-09-24 ENCOUNTER — OFFICE VISIT (OUTPATIENT)
Dept: MATERNAL FETAL MEDICINE | Facility: CLINIC | Age: 29
End: 2024-09-24
Attending: OBSTETRICS & GYNECOLOGY
Payer: COMMERCIAL

## 2024-09-24 ENCOUNTER — HOSPITAL ENCOUNTER (OUTPATIENT)
Dept: ULTRASOUND IMAGING | Facility: CLINIC | Age: 29
Discharge: HOME | End: 2024-09-24
Attending: OBSTETRICS & GYNECOLOGY
Payer: COMMERCIAL

## 2024-09-24 DIAGNOSIS — O26.90 PREGNANCY RELATED CONDITION, ANTEPARTUM: ICD-10-CM

## 2024-09-24 DIAGNOSIS — Z36.0 ENCOUNTER FOR ANTENATAL SCREENING FOR CHROMOSOMAL ANOMALIES: Primary | ICD-10-CM

## 2024-09-24 DIAGNOSIS — Z36.9 FIRST TRIMESTER SCREENING: Primary | ICD-10-CM

## 2024-09-24 PROCEDURE — 96040 HC GENETIC COUNSELING, EACH 30 MINUTES: CPT

## 2024-09-24 PROCEDURE — 76813 OB US NUCHAL MEAS 1 GEST: CPT | Mod: 26 | Performed by: OBSTETRICS & GYNECOLOGY

## 2024-09-24 PROCEDURE — 76813 OB US NUCHAL MEAS 1 GEST: CPT

## 2024-09-24 NOTE — PROGRESS NOTES
"Please see \"Imaging\" tab under \"Chart Review\" for details of today's visit.    Miguel Ángel Nunez    "

## 2024-09-25 NOTE — PROGRESS NOTES
[facility]  Genetic Counseling Consult    Patient:  Supriya Feng YOB: 1995   Date of Service:  24   MRN: [id number]    Supriya was seen at the [facility] [place] for genetic consultation. The indication for genetic counseling is desire to discuss options for genetic screening and diagnostics. The patient was accompanied to this visit by their Sae smith.    IMPRESSION/ PLAN   1. Supriya has not had genetic screening in this pregnancy but elected to have screening today.     2. During today's [facility] visit, Supriya had a blood draw for expanded non-invasive prenatal testing (also called NIPT, NIPS, or cell-free DNA) through [facility] ([facility]). The expanded NIPT screens for trisomy 21, 18, and 13 and select microdeletion syndromes, including 22q11.2 deletion syndrome. The patient opted to screen for sex chromosome aneuploidies, including reported fetal sex. Results are expected in 7-14 days. The patient will be called with results and if they do not answer they requested a detailed message with results on their voicemail, including the predicted fetal sex information.  Patient was informed that results, including fetal sex, will be available in 7writet.    3. Supriya had a nuchal translucency ultrasound today. Please see the ultrasound report for further details.    4. Further recommendations include a fetal anatomy level II ultrasound with [facility]. The upcoming ultrasound has been scheduled for 2024.    PREGNANCY HISTORY   /Parity:       Supriya's pregnancy history is significant for:   Term 2014    CURRENT PREGNANCY   Current Age: 29 year old     Age at Delivery: 30 year old    AYO: 3/26/2025, by Last Menstrual Period                                     Gestational Age: 14w0d    This pregnancy is a single gestation.     This pregnancy was conceived spontaneously.    Supriya reports no  "bleeding, complications, illnesses, fever or exposure concerns with this pregnancy.     MEDICAL HISTORY   Supriya s reported medical history is not expected to impact pregnancy management or risks to fetal development.        FAMILY HISTORY   A three-generation pedigree was obtained today and is scanned under the \"Media\" tab in Epic. The family history was reported by Supriya and their partner.    The following significant findings were reported today:   Supriya's mother passed away at age 42 due to a heart condition. Reviewed that there inherited heart conditions. I encouraged Supriya to share this information with her primary care provider. If Supriya learns more information about her mother's heart condition she can reach back out and I can provide an updated risk assessment.   The father of the pregnancy, Sae (35yrs), is healthy  Sae's paternal aunt  from breast cancer in her 40's. We discussed how most cancer seen in families occurs sporadically, but about 5-10% may be due to an underlying genetic etiology. The couple was encouraged to share this family history information with their primary care providers to ensure appropriate screening.    Otherwise, the reported family history is unremarkable for multiple miscarriages, stillbirths, birth defects, intellectual disabilities, known genetic conditions, and consanguinity.       RISK ASSESSMENT FOR CHROMOSOME CONDITIONS   We explained that the risk for fetal chromosome abnormalities increases with maternal age. We discussed specific features of common chromosome abnormalities, including Down syndrome, trisomy 13, trisomy 18, and sex chromosome trisomies.    At age 30 at midtrimester, the risk to have a baby with Down syndrome is 1 in 597.  At age 30 at midtrimester, the risk to have a baby with any chromosome abnormality is 1 in 299.     Supriya has not had genetic screening in this pregnancy but elected to have screening today.      RISK " ASSESSMENT FOR INHERITED CONDITIONS   We discussed that every pregnancy has a chance to have an inherited single-gene condition, even when there is no family history of that condition. In fact, approximately 90% of couples at an increased reproductive risk for an inherited condition have no family history of that condition. The average person may be a carrier for 5-10 different genetic variants that can increase the chance for their pregnancies to have that condition. We discussed autosomal recessive conditions and X-linked conditions. Autosomal recessive conditions happen when a mutation has been inherited from the egg and sperm and include conditions like cystic fibrosis, thalassemia, hearing loss, spinal muscular atrophy, and more. X-linked conditions happen when a mutation has been inherited from the egg and include conditions like fragile X syndrome.     We reviewed that when both biological parents carry a harmful genetic change in a gene associated with autosomal recessive inheritance, each of their pregnancies has a 1 in 4 (25%) chance to be affected by that condition. With x-linked conditions, the specific risk generally depends on the chromosomal sex of the fetus, with XY individuals (generally male) being most severely affected.      screening was reviewed. About [place]  Screening    The patient does NOT have a family history of known inherited conditions. This does NOT mean the patient and/or their partner is not a carrier of a condition. Approximately 90% of couples at an increased reproductive risk for an inherited condition have no family history of that condition.  The patient has not had carrier screening previously. The patient was not certain about whether to pursue carrier screening today. They will contact us if they would like to pursue screening. See below for the more detailed information we discussed.    We discussed that expanded carrier screening is designed to identify carrier  status for conditions that are primarily childhood or adolescent onset. Expanded carrier screening does not evaluate for adult-onset conditions such as hereditary cancer syndromes, dementia/ Alzheimer's disease, or cardiovascular disease risk factors. Additionally, expanded carrier screening is not comprehensive for all known genetic diseases or inherited conditions. It will not intentionally screen for autosomal dominant conditions. This is a screening test, and residual carrier status risk figures will be provided to the patient after results become available. Carrier screening is not meant to diagnose the patient with a condition, and generally carriers are asymptomatic. However, certain genes may confer increased risks for various health concerns in carriers (including, but not limited to: NICOLA, DMD, FMR1).    GENETIC TESTING OPTIONS   Genetic testing during a pregnancy includes screening and diagnostic procedures.      Screening tests are non-invasive which means no risk to the pregnancy and includes ultrasounds and blood work. The benefits and limitations of screening were reviewed. Screening tests provide a risk assessment (chance) specific to the pregnancy for certain fetal chromosome abnormalities but cannot definitively diagnose or exclude a fetal chromosome abnormality. Follow-up genetic counseling and consideration of diagnostic testing is recommended with any abnormal screening result. Diagnostic testing during a pregnancy is more certain and can test for more conditions. However, the tests do have a risk of miscarriage that requires careful consideration. These tests can detect fetal chromosome abnormalities with greater than 99% certainty. Results can be compromised by maternal cell contamination or mosaicism and are limited by the resolution of current genetic testing technology.     There is no screening or diagnostic test that detects all forms of birth defects or intellectual disability.     We  discussed the following screening options:   Non-invasive prenatal testing (NIPT)  Also called cell-free DNA screening because it detect chromosome fragments from the placenta in the pregnant person's blood.  Can be done any time after 10 weeks gestation.  Screens for trisomy 21, trisomy 18, trisomy 13, and sex chromosome aneuploidies. ACMG also recommends consideration of screening for 22q11.2 microdeletion syndrome.  Does not screen for all known chromosomal conditions.  Even with negative results, a residual risk for screened conditions remains.  Cannot screen for open neural tube defects, maternal serum AFP after 15 weeks is recommended    Carrier screening  Risk assessment for certain autosomal recessive and x-linked conditions. These conditions are generally infantile- or childhood-onset conditions.   Can be done any time during the pregnancy or prior to pregnancy.  Can screen for over 500 different genetic conditions.  Is not intended to diagnosis a condition in the carrier parent.    Even with negative results, a residual risk for screened conditions remains.      We discussed the following ultrasound options:  Nuchal translucency (NT) ultrasound  Ultrasound between 16h3o-09t0l that includes nuchal translucency measurement and nasal bone assessments  Nuchal translucency refers to the space at the back of the neck where fluid builds up. All babies at this stage have fluid and there is only concern if there is too much fluid  Nasal bone refers to the small bone in the nose. There is concern for conditions like Down syndrome if the bone cannot be seen at all  This ultrasound can be done as part of first trimester screening, at the same time as another screen (NIPT), at the same time as a CVS, or if the patients does not want genetic screening.  Markers on ultrasound detects about 70% of pregnancies with aneuploidy  Abnormalities on NT ultrasound can also increase the risk for a birth defect, like a heart  defect  Comprehensive level II ultrasound (Fetal Anatomy Ultrasound)  Ultrasound done between 18-20 weeks gestation  Screens for major birth defects and markers for aneuploidy (like trisomy 21 and trisomy 18)  Includes looking at the fetus/baby's growth, heart, organs (stomach, kidneys), placenta, and amniotic fluid    We discussed the following diagnostic options:   Amniocentesis  Invasive diagnostic procedure done after 15 weeks gestation  The procedure collects a small sample of amniotic fluid for the purpose of chromosomal testing and/or other genetic testing  Diagnostic result; more than 99% sensitivity for fetal chromosome abnormalities  Testing for AFP in the amniotic fluid can test for open neural tube defects      It was a pleasure to be involved with Supriya s care. Face-to-face time of the meeting was 30 minutes.    MIKE MERCADO MS, [facility]  Genetic Counselor  [facility]  Maternal Fetal Medicine  Office: [phone]   [facility]: [phone]   Fax: [phone]  [facility]

## 2024-10-02 ENCOUNTER — TELEPHONE (OUTPATIENT)
Dept: MATERNAL FETAL MEDICINE | Facility: CLINIC | Age: 29
End: 2024-10-02
Payer: COMMERCIAL

## 2024-10-02 NOTE — TELEPHONE ENCOUNTER
October 2, 2024    Left a message for Supriya regarding her Prequel (NIPT) results through [facility].     Results indicate NO ANEUPLOIDY DETECTED for chromosomes 21, 18, 13, or sex chromosomes (XY - male predicted fetal sex). Results also indicate NO MICRODELETION DETECTED for the 22q11.2 or other select microdeletions (15q11.2, 1p36, 4p, and 5p).    This puts her current pregnancy at low risk for Down syndrome, trisomy 18, trisomy 13 and sex chromosome abnormalities. This test is reported to have the following sensitivities: Down syndrome: 99.7%, trisomy 18: 97.9%, trisomy 13: 99.0%, monosomy X: 95.8%, XX: 97.6%, and XY: 99.1%. Although these results are reassuring, this does not replace a standard chromosome analysis from a chorionic villus sampling or amniocentesis. The results also reduce, but do not eliminate, the risk for 22q11.2 deletion syndrome and other select microdeletions.    MSAFP is the appropriate second trimester screening test for open neural tube defects; the maternal quad screen is not recommended.      This information was left in Supriya's voicemail per plan established at her genetic counseling appointment, and Supriya was encouraged to reach out if she has any questions or concerns.      Her results are available in her Epic chart for her primary OB to review.    MIKE MERCADO MS, [facility]  Genetic Counselor  [facility]  Maternal Fetal Medicine  Office: [phone]   [facility]: [phone]   Fax: [phone]  [facility]

## 2024-10-11 ENCOUNTER — TELEPHONE (OUTPATIENT)
Dept: OBGYN | Facility: CLINIC | Age: 29
End: 2024-10-11
Payer: COMMERCIAL

## 2024-11-04 ENCOUNTER — PRENATAL OFFICE VISIT (OUTPATIENT)
Dept: OBGYN | Facility: CLINIC | Age: 29
End: 2024-11-04
Attending: OBSTETRICS & GYNECOLOGY
Payer: COMMERCIAL

## 2024-11-04 ENCOUNTER — OFFICE VISIT (OUTPATIENT)
Dept: MATERNAL FETAL MEDICINE | Facility: CLINIC | Age: 29
End: 2024-11-04
Attending: OBSTETRICS & GYNECOLOGY
Payer: COMMERCIAL

## 2024-11-04 ENCOUNTER — HOSPITAL ENCOUNTER (OUTPATIENT)
Dept: ULTRASOUND IMAGING | Facility: CLINIC | Age: 29
Discharge: HOME | End: 2024-11-04
Attending: OBSTETRICS & GYNECOLOGY
Payer: COMMERCIAL

## 2024-11-04 VITALS
DIASTOLIC BLOOD PRESSURE: 69 MMHG | HEART RATE: 98 BPM | BODY MASS INDEX: 31.32 KG/M2 | WEIGHT: 206 LBS | SYSTOLIC BLOOD PRESSURE: 104 MMHG

## 2024-11-04 DIAGNOSIS — Z36.9 FIRST TRIMESTER SCREENING: ICD-10-CM

## 2024-11-04 DIAGNOSIS — Z36.89 ENCOUNTER FOR FETAL ANATOMIC SURVEY: Primary | ICD-10-CM

## 2024-11-04 DIAGNOSIS — Z87.59 HISTORY OF SHOULDER DYSTOCIA IN PRIOR PREGNANCY: ICD-10-CM

## 2024-11-04 DIAGNOSIS — Z34.82 ENCOUNTER FOR SUPERVISION OF OTHER NORMAL PREGNANCY, SECOND TRIMESTER: Primary | ICD-10-CM

## 2024-11-04 PROCEDURE — 99207 PR PRENATAL VISIT: CPT | Performed by: OBSTETRICS & GYNECOLOGY

## 2024-11-04 PROCEDURE — G0463 HOSPITAL OUTPT CLINIC VISIT: HCPCS | Performed by: OBSTETRICS & GYNECOLOGY

## 2024-11-04 PROCEDURE — 76805 OB US >/= 14 WKS SNGL FETUS: CPT

## 2024-11-04 PROCEDURE — 76805 OB US >/= 14 WKS SNGL FETUS: CPT | Mod: 26 | Performed by: OBSTETRICS & GYNECOLOGY

## 2024-11-04 NOTE — NURSING NOTE
Education provided to patient on today's ultrasound.  SBAR given to MARQUIS COOK, see their note in Epic.

## 2024-11-04 NOTE — PROGRESS NOTES
Chief Complaint   Patient presents with    Prenatal Care     MARISSA 19 weeks and 5 days    Guerda Mcclellan LPN

## 2024-11-05 NOTE — PROGRESS NOTES
S:  Doing well today with no concerns.  Feeling FM, no cramping or contractions.  Had normal NIPT, declines AFP only today.  Comprehensive ultrasound is scheduled this afternoon but is considering rescheduling it.      O: /69   Pulse 98   Wt 93.4 kg (206 lb)   LMP 2024   BMI 31.32 kg/m      See flow    A:  30 y/o  at 19+5 weeks, doing well.  Pregnancy uncomplicated to date.      P:  Comprehensive ultrasound later today.  Declines Afp only and flu shot.  Her Hgb A1C was normal with her new OB labs, early GCT has been ordered-encouraged her to considering having it done before her next visit.  RTC 4 weeks with OB or CNM service (reviewed CNM service, discussed that she would be a good candidate for CNM care if that is something she would like).     Gricelda Kincaid MD, FACOG

## 2025-01-04 ENCOUNTER — HEALTH MAINTENANCE LETTER (OUTPATIENT)
Age: 30
End: 2025-01-04

## 2025-01-13 ENCOUNTER — TELEPHONE (OUTPATIENT)
Dept: OBGYN | Facility: CLINIC | Age: 30
End: 2025-01-13

## 2025-01-13 ENCOUNTER — E-VISIT (OUTPATIENT)
Dept: URGENT CARE | Facility: CLINIC | Age: 30
End: 2025-01-13

## 2025-01-13 DIAGNOSIS — R06.2 WHEEZING: Primary | ICD-10-CM

## 2025-01-13 PROCEDURE — 99207 PR NON-BILLABLE SERV PER CHARTING: CPT | Performed by: NURSE PRACTITIONER

## 2025-01-13 NOTE — TELEPHONE ENCOUNTER
Pt is following up on this and requesting a call back to speak to a nurse to get next steps from here. Please advise.

## 2025-01-13 NOTE — PATIENT INSTRUCTIONS
Dear Supriya Feng,    We are sorry you are not feeling well. Based on the responses you provided, it is recommended that you be seen in-person in urgent care so we can better evaluate your symptoms. Please click here to find the nearest urgent care location to you.   You will not be charged for this Visit. Thank you for trusting us with your care.    Ellyn Liao, CNP

## 2025-01-13 NOTE — TELEPHONE ENCOUNTER
29w5d. Patient states she is having difficulty breathing and yellow/green thick sputum x 2 days. She also finds it difficult to move and work due to fatigue. Advised patient to go be evaluated in ED for pneumonia.

## 2025-01-13 NOTE — TELEPHONE ENCOUNTER
[facility] Call Center    Phone Message    May a detailed message be left on voicemail: yes     Reason for Call: Other: Pt states she has some concerns with her pregnancy. Pt states she is also starting to get a cold. Please review and call pt to discuss. Pt requests caller to call twice before leaving a voicemail due to pt being at work. Thank you.     Action Taken: Other: [facility]    Travel Screening: Not Applicable     Date of Service:

## 2025-01-13 NOTE — TELEPHONE ENCOUNTER
Caller reporting the following red-flag symptom(s): Abdominal/ pelvic pain, back pain    Per the system red-flag symptom policy, patient was instructed to:  Speak to registered nurse    Action:  Transferred to red flag triage line, pt refused to hang up, would prefer to wait.

## 2025-01-14 ENCOUNTER — HOSPITAL ENCOUNTER (EMERGENCY)
Facility: CLINIC | Age: 30
Discharge: HOME | End: 2025-01-14
Attending: FAMILY MEDICINE

## 2025-01-14 VITALS
WEIGHT: 222 LBS | HEIGHT: 66 IN | SYSTOLIC BLOOD PRESSURE: 92 MMHG | OXYGEN SATURATION: 97 % | TEMPERATURE: 99.1 F | BODY MASS INDEX: 35.68 KG/M2 | RESPIRATION RATE: 18 BRPM | HEART RATE: 117 BPM | DIASTOLIC BLOOD PRESSURE: 44 MMHG

## 2025-01-14 DIAGNOSIS — J10.1 INFLUENZA A: ICD-10-CM

## 2025-01-14 DIAGNOSIS — E86.0 DEHYDRATION: ICD-10-CM

## 2025-01-14 LAB
ALBUMIN SERPL BCG-MCNC: 3.6 G/DL (ref 3.5–5.2)
ALP SERPL-CCNC: 77 U/L (ref 40–150)
ALT SERPL W P-5'-P-CCNC: 13 U/L (ref 0–50)
ANION GAP SERPL CALCULATED.3IONS-SCNC: 12 MMOL/L (ref 7–15)
AST SERPL W P-5'-P-CCNC: 18 U/L (ref 0–45)
BASOPHILS # BLD AUTO: 0 10E3/UL (ref 0–0.2)
BASOPHILS NFR BLD AUTO: 0 %
BILIRUB SERPL-MCNC: 0.2 MG/DL
BUN SERPL-MCNC: 6.6 MG/DL (ref 6–20)
CALCIUM SERPL-MCNC: 8.8 MG/DL (ref 8.8–10.4)
CHLORIDE SERPL-SCNC: 101 MMOL/L (ref 98–107)
CREAT SERPL-MCNC: 0.6 MG/DL (ref 0.51–0.95)
EGFRCR SERPLBLD CKD-EPI 2021: >90 ML/MIN/1.73M2
EOSINOPHIL # BLD AUTO: 0.1 10E3/UL (ref 0–0.7)
EOSINOPHIL NFR BLD AUTO: 2 %
ERYTHROCYTE [DISTWIDTH] IN BLOOD BY AUTOMATED COUNT: 13.2 % (ref 10–15)
FLUAV RNA SPEC QL NAA+PROBE: POSITIVE
FLUBV RNA RESP QL NAA+PROBE: NEGATIVE
GLUCOSE SERPL-MCNC: 82 MG/DL (ref 70–99)
HCO3 SERPL-SCNC: 21 MMOL/L (ref 22–29)
HCT VFR BLD AUTO: 35.1 % (ref 35–47)
HGB BLD-MCNC: 11.7 G/DL (ref 11.7–15.7)
HOLD SPECIMEN: NORMAL
HOLD SPECIMEN: NORMAL
IMM GRANULOCYTES # BLD: 0.1 10E3/UL
IMM GRANULOCYTES NFR BLD: 1 %
LIPASE SERPL-CCNC: 25 U/L (ref 13–60)
LYMPHOCYTES # BLD AUTO: 0.8 10E3/UL (ref 0.8–5.3)
LYMPHOCYTES NFR BLD AUTO: 9 %
MAGNESIUM SERPL-MCNC: 1.7 MG/DL (ref 1.7–2.3)
MCH RBC QN AUTO: 32.3 PG (ref 26.5–33)
MCHC RBC AUTO-ENTMCNC: 33.3 G/DL (ref 31.5–36.5)
MCV RBC AUTO: 97 FL (ref 78–100)
MONOCYTES # BLD AUTO: 0.9 10E3/UL (ref 0–1.3)
MONOCYTES NFR BLD AUTO: 10 %
NEUTROPHILS # BLD AUTO: 7.2 10E3/UL (ref 1.6–8.3)
NEUTROPHILS NFR BLD AUTO: 79 %
NRBC # BLD AUTO: 0 10E3/UL
NRBC BLD AUTO-RTO: 0 /100
PLATELET # BLD AUTO: 299 10E3/UL (ref 150–450)
POTASSIUM SERPL-SCNC: 4 MMOL/L (ref 3.4–5.3)
PROT SERPL-MCNC: 6.6 G/DL (ref 6.4–8.3)
RBC # BLD AUTO: 3.62 10E6/UL (ref 3.8–5.2)
RSV RNA SPEC NAA+PROBE: NEGATIVE
SARS-COV-2 RNA RESP QL NAA+PROBE: NEGATIVE
SODIUM SERPL-SCNC: 134 MMOL/L (ref 135–145)
TROPONIN T SERPL HS-MCNC: <6 NG/L
WBC # BLD AUTO: 9.1 10E3/UL (ref 4–11)

## 2025-01-14 PROCEDURE — 83690 ASSAY OF LIPASE: CPT | Performed by: FAMILY MEDICINE

## 2025-01-14 PROCEDURE — 85025 COMPLETE CBC W/AUTO DIFF WBC: CPT | Performed by: FAMILY MEDICINE

## 2025-01-14 PROCEDURE — 258N000003 HC RX IP 258 OP 636: Performed by: FAMILY MEDICINE

## 2025-01-14 PROCEDURE — 84155 ASSAY OF PROTEIN SERUM: CPT | Performed by: FAMILY MEDICINE

## 2025-01-14 PROCEDURE — 87637 SARSCOV2&INF A&B&RSV AMP PRB: CPT | Performed by: FAMILY MEDICINE

## 2025-01-14 PROCEDURE — 96360 HYDRATION IV INFUSION INIT: CPT | Performed by: FAMILY MEDICINE

## 2025-01-14 PROCEDURE — 93010 ELECTROCARDIOGRAM REPORT: CPT | Performed by: FAMILY MEDICINE

## 2025-01-14 PROCEDURE — 250N000013 HC RX MED GY IP 250 OP 250 PS 637: Performed by: FAMILY MEDICINE

## 2025-01-14 PROCEDURE — 84484 ASSAY OF TROPONIN QUANT: CPT | Performed by: FAMILY MEDICINE

## 2025-01-14 PROCEDURE — 83735 ASSAY OF MAGNESIUM: CPT | Performed by: FAMILY MEDICINE

## 2025-01-14 PROCEDURE — 80051 ELECTROLYTE PANEL: CPT | Performed by: FAMILY MEDICINE

## 2025-01-14 PROCEDURE — 96361 HYDRATE IV INFUSION ADD-ON: CPT | Performed by: FAMILY MEDICINE

## 2025-01-14 PROCEDURE — 80053 COMPREHEN METABOLIC PANEL: CPT | Performed by: FAMILY MEDICINE

## 2025-01-14 PROCEDURE — 99284 EMERGENCY DEPT VISIT MOD MDM: CPT | Performed by: FAMILY MEDICINE

## 2025-01-14 PROCEDURE — 36415 COLL VENOUS BLD VENIPUNCTURE: CPT | Performed by: FAMILY MEDICINE

## 2025-01-14 PROCEDURE — 93005 ELECTROCARDIOGRAM TRACING: CPT | Performed by: FAMILY MEDICINE

## 2025-01-14 RX ORDER — ACETAMINOPHEN 500 MG
1000 TABLET ORAL ONCE
Status: COMPLETED | OUTPATIENT
Start: 2025-01-14 | End: 2025-01-14

## 2025-01-14 RX ORDER — ONDANSETRON 2 MG/ML
4 INJECTION INTRAMUSCULAR; INTRAVENOUS EVERY 30 MIN PRN
Status: DISCONTINUED | OUTPATIENT
Start: 2025-01-14 | End: 2025-01-14 | Stop reason: HOSPADM

## 2025-01-14 RX ORDER — ACETAMINOPHEN 500 MG
500-1000 TABLET ORAL EVERY 6 HOURS PRN
Qty: 30 TABLET | Refills: 0 | Status: SHIPPED | OUTPATIENT
Start: 2025-01-14 | End: 2025-01-21

## 2025-01-14 RX ORDER — OSELTAMIVIR PHOSPHATE 75 MG/1
75 CAPSULE ORAL 2 TIMES DAILY
Qty: 10 CAPSULE | Refills: 0 | Status: SHIPPED | OUTPATIENT
Start: 2025-01-14 | End: 2025-01-19

## 2025-01-14 RX ORDER — ONDANSETRON 4 MG/1
4 TABLET, ORALLY DISINTEGRATING ORAL EVERY 8 HOURS PRN
Qty: 10 TABLET | Refills: 0 | Status: SHIPPED | OUTPATIENT
Start: 2025-01-14 | End: 2025-01-17

## 2025-01-14 RX ADMIN — SODIUM CHLORIDE 1000 ML: 9 INJECTION, SOLUTION INTRAVENOUS at 16:36

## 2025-01-14 RX ADMIN — ACETAMINOPHEN 1000 MG: 500 TABLET, FILM COATED ORAL at 15:11

## 2025-01-14 RX ADMIN — SODIUM CHLORIDE 1000 ML: 9 INJECTION, SOLUTION INTRAVENOUS at 15:11

## 2025-01-14 ASSESSMENT — COLUMBIA-SUICIDE SEVERITY RATING SCALE - C-SSRS
6. HAVE YOU EVER DONE ANYTHING, STARTED TO DO ANYTHING, OR PREPARED TO DO ANYTHING TO END YOUR LIFE?: NO
1. IN THE PAST MONTH, HAVE YOU WISHED YOU WERE DEAD OR WISHED YOU COULD GO TO SLEEP AND NOT WAKE UP?: NO
2. HAVE YOU ACTUALLY HAD ANY THOUGHTS OF KILLING YOURSELF IN THE PAST MONTH?: NO

## 2025-01-14 ASSESSMENT — ACTIVITIES OF DAILY LIVING (ADL)
ADLS_ACUITY_SCORE: 41

## 2025-01-14 NOTE — LETTER
January 14, 2025      To Whom It May Concern:      Supriya Feng was seen in our Emergency Department today, 01/14/25.  I expect her condition to improve over the next 3 days.  She may return to work/school when improved.    Sincerely,        Michel Haddad MD  Electronically signed

## 2025-01-14 NOTE — DISCHARGE INSTRUCTIONS
Thank you for choosing [facility].     Please closely monitor for further symptoms. Return to the Emergency Department if you develop any new or worsening signs or symptoms.    If you received any opiate pain medications or sedatives during your visit, please do not drive for at least 8 hours.     Labs, cultures or final xray interpretations may still need to be reviewed.  We will call you if your plan of care needs to be changed.    Use Tylenol 1000 mg every 6 hours as needed for body aches and fever.  Zofran if needed for nausea.  Robitussin if needed for cough.  Drink fluids frequently, see work restrictions for the next 3 days.  Please follow up with your primary care physician or clinic.

## 2025-01-14 NOTE — PATIENT INSTRUCTIONS
Thank you for trusting us with your care!   Please be aware, if you are on Mychart, you may see your results prior to your providers review. If labs are abnormal, we will call or message you on Mychart with a follow up plan.    If you need to contact us for questions about:  Symptoms, Scheduling & Medical Questions; Non-urgent (2-3 day response) Mychart message, Urgent (needing response today) [phone] (if after 3:30pm next day response)   Prescriptions: Please call your Pharmacy   Billing: Alexi [phone] or [facility]:[phone]    Weeks 26 to 30 of Your Pregnancy: Care Instructions  You're starting your last trimester. You'll probably feel your baby moving around more. Your back may ache as your body gets used to your baby's size and length. Take care of yourself, and pay attention to what your body needs.    Talk to your doctor about getting the Tdap shot. It will help protect your  against whooping cough (pertussis). Also ask your doctor about flu and COVID-19 shots if you haven't had them yet. If your blood type is Rh negative, you may be given a shot of Rh immune globulin (such as RhoGAM). It can help prevent problems for your baby.   You may have George-Milligan contractions. They are single or several strong contractions without a pattern. These are practice contractions but not the start of labor.   Be kind to yourself.       Take breaks when you're tired.  Change positions often. Don't sit for too long or stand for too long.  At work, rest during breaks if you can. If you don't get breaks, talk to your doctor about writing a letter to your employer to request them.  Avoid fumes, chemicals, and tobacco smoke.  Be sexual if you want to.       You may be interested in sex, or you may not. Everyone is different.  Sex is okay unless your doctor tells you not to.  Your belly can make it hard to find good positions for sex. Stanley and explore.  Watch for signs of  labor.       "  These signs include:  Menstrual-like cramps. Or you may have pain or pressure in your pelvis that happens in a pattern.  About 6 or more contractions in an hour (even after rest and a glass of water).  A low, dull backache that doesn't go away when you change positions.  An increase or change in vaginal discharge.  Light vaginal bleeding or spotting.  Your water breaking.  Know what to do if you think you are having contractions.       Drink 1 or 2 glasses of water.  Lie down on your left side for at least an hour.  While on your side, feel the top of your belly to see if it's tight.  Write down your contractions for an hour. Time how long it is from the start of one contraction to the start of the next.  Call your doctor if you have regular contractions.  Follow-up care is a key part of your treatment and safety. Be sure to make and go to all appointments, and call your doctor if you are having problems. It's also a good idea to know your test results and keep a list of the medicines you take.  Where can you learn more?  Go to https://www.[employer].net/patiented  Enter S999 in the search box to learn more about \"Weeks 26 to 30 of Your Pregnancy: Care Instructions.\"  Current as of: April 30, 2024  Content Version: 14.3    2024 [employer].   Care instructions adapted under license by your healthcare professional. If you have questions about a medical condition or this instruction, always ask your healthcare professional. [employer] disclaims any warranty or liability for your use of this information.    Weeks 30 to 32 of Your Pregnancy: Care Instructions  Your baby is growing more every day. Its eyes can open and close, and it may have hair on its head. Your baby may sleep 20 to 45 minutes at a time and is more active at certain times.    You should feel your baby move several times every day. Your baby now turns less and kicks more.   This is a good time to tour your hospital or birthing " "center. You may also want to find childcare if needed.         To ease heartburn   Avoid foods that make your symptoms worse, such as chocolate, spicy foods, and caffeine.  Avoid bending over or lying down after meals.  Do not eat for 2 hours before bedtime.  Take antacids like Tums, but don't take ones that have sodium bicarbonate, magnesium trisilicate, or aspirin.        To care for large, swollen veins (varicose veins)   Try to avoid standing for long periods of time.  Sit with your feet propped up.  Wear support hose.  Get some exercise every day, like walking or swimming.  Counting your baby's kicks  Your doctor may ask you to count your baby's movements, such as kicks, flutters, or rolls.    Find a quiet place, and get comfortable. Write down your start time. Count your baby's movements (except hiccups). When your baby has moved 10 times, you can stop counting. Write down how many minutes it took.   If an hour goes by and you don't feel 10 movements, have something to eat or drink. Count for another hour. If you don't feel at least 10 movements in the 2-hour period, call your doctor.   Follow-up care is a key part of your treatment and safety. Be sure to make and go to all appointments, and call your doctor if you are having problems. It's also a good idea to know your test results and keep a list of the medicines you take.  Where can you learn more?  Go to https://www.[employer].net/patiented  Enter X471 in the search box to learn more about \"Weeks 30 to 32 of Your Pregnancy: Care Instructions.\"  Current as of: April 30, 2024  Content Version: 14.3    2024 [employer].   Care instructions adapted under license by your healthcare professional. If you have questions about a medical condition or this instruction, always ask your healthcare professional. [employer] disclaims any warranty or liability for your use of this information.    Learning About Birth Control After Childbirth  Birth " "control is any method used to prevent pregnancy. If you have vaginal sex without birth control, you could get pregnant--even if you haven't started having periods again. You're less likely to get pregnant while breastfeeding, but it's still possible. Finding birth control that works for you can help avoid an unplanned pregnancy.  There are many kinds of birth control. Each has pros and cons. Find what works for you. Talk to your doctor if you've just given birth or are breastfeeding.    Long-acting reversible contraception (LARC). These are placed inside your body by a doctor. They can prevent pregnancy for years.  Examples include:  An implant (hormonal).  Copper intrauterine device (IUD).  Hormonal IUDs.    Short-acting hormonal methods. These release hormones. Examples include:  Combination birth control pills (\"the pill\").  Skin patches.  A vaginal ring.  A shot.  Mini-pills. Choose progestin-only options soon after giving birth.    Barrier methods. Use these every time you have vaginal sex.  Examples include:  External (male) condoms.  Internal (female) condoms.  Diaphragms.  Cervical caps.  Sponges.    Spermicides. These kill sperm or stop sperm from moving. They can be gels, creams, foams, films, or tablets. Use them before vaginal sex.  Examples include:  Nonoxynol-9.  pH regulator gel.    Permanent birth control (sterilization). This can be an option if you're sure that you don't want to get pregnant later.  Examples include:  Vasectomy.  Having tubes tied (tubal ligation).    Emergency contraception. This is a backup method. Use it if you didn't use birth control or your birth control method failed.  Examples include:  Copper and hormonal IUDs.  Emergency contraceptive pills.    Fertility awareness. You'll learn when you are most likely to become pregnant (are fertile). You can avoid vaginal sex at that time.  It's also called:  Natural family planning.  The rhythm method.    Breastfeeding. This is most " "effective when all of these are true:  Your baby is younger than 6 months old.  You're breastfeeding and not bottle-feeding at all.  You aren't having periods.  Follow-up care is a key part of your treatment and safety. Be sure to make and go to all appointments, and call your doctor if you are having problems. It's also a good idea to know your test results and keep a list of the medicines you take.  Where can you learn more?  Go to https://www.[employer].net/patiented  Enter X408 in the search box to learn more about \"Learning About Birth Control After Childbirth.\"  Current as of: April 30, 2024  Content Version: 14.3    2024 [employer].   Care instructions adapted under license by your healthcare professional. If you have questions about a medical condition or this instruction, always ask your healthcare professional. [employer] disclaims any warranty or liability for your use of this information.    "

## 2025-01-14 NOTE — ED TRIAGE NOTES
Pt has a low grade temp     Triage Assessment (Adult)       Row Name 01/14/25 1447          Triage Assessment    Airway WDL WDL        Respiratory WDL    Respiratory WDL WDL        Cardiac WDL    Cardiac WDL WDL;chest pain        Chest Pain Assessment    Character aching

## 2025-01-14 NOTE — ED PROVIDER NOTES
"ED Provider Note  [facility]      History     Chief Complaint   Patient presents with    Chest Pain     Pt has chest pain since Sunday. Pt is 30 weeks pregnant. She is SOB. She also has back pain and vomiting     HPI  Supriya Feng is a 29 year old female who is 30 weeks pregnant.  She complains of fever, chills, cold sweats, cough, sore throat, diffuse myalgias, nausea and vomiting.  All of this started approximately 72 hours ago.  She is not having contractions or abdominal pain beyond \"my whole body hurting\".  Denies any leakage of fluid from the vagina, vaginal bleeding and states the fetus is \"moving too much\".  She feels short of breath because it hurts to cough or take a deep breath.  She has not taken anything for her fever or pain.  She states she has had very little to eat or drink today because her throat is sore and she is nauseated and vomiting.  No diarrhea.    Past Medical History  Past Medical History:   Diagnosis Date    Adjustment disorder with mixed anxiety and depressed mood 03/23/2010    Adjustment disorder with mixed emotional features 03/23/2010    History of cigar smoking 04/29/2014    Low back pain 09/27/2014    Major depression 06/11/2015    Nexplanon removal 06/13/2023    was inserted in 2015 by report    Personal history of urinary tract infection     recurrent    Substance use disorder     Suicidal behavior 02/11/2010    Cutting. Seen in Er @ [facility] for self inflicted laceration.     Past Surgical History:   Procedure Laterality Date    widom teeth       acetaminophen (TYLENOL) 325 MG tablet  acetaminophen (TYLENOL) 500 MG tablet  ondansetron (ZOFRAN ODT) 4 MG ODT tab  oseltamivir (TAMIFLU) 75 MG capsule  Prenatal Vit-Fe Fumarate-FA (PRENATAL MULTIVITAMIN  PLUS IRON) 27-1 MG TABS      No Known Allergies  Family History  Family History   Problem Relation Age of Onset    Substance Abuse Mother     Mental Illness Mother     Diabetes Father     Cancer " "Father     Diabetes Paternal Grandmother     Cancer Paternal Grandmother     Diabetes Paternal Grandfather     Diabetes Other      Social History   Social History     Tobacco Use    Smoking status: Former     Types: Cigarettes    Smokeless tobacco: Never   Vaping Use    Vaping status: Former   Substance Use Topics    Alcohol use: Not Currently     Comment: history    Drug use: Not Currently     Types: \"Crack\" cocaine, Marijuana      A medically appropriate review of systems was performed with pertinent positives and negatives noted in the HPI, and all other systems negative.    Physical Exam   BP: 132/78  Pulse: (!) 122  Temp: 99.9  F (37.7  C)  Resp: 18  Height: 167.6 cm (5' 6\")  Weight: 100.7 kg (222 lb)  SpO2: 98 %  Physical Exam  Vitals and nursing note reviewed.   Constitutional:       General: She is not in acute distress.     Appearance: Normal appearance. She is not diaphoretic.   HENT:      Head: Atraumatic.      Mouth/Throat:      Mouth: Mucous membranes are moist.      Pharynx: Uvula midline. Posterior oropharyngeal erythema present. No pharyngeal swelling.      Tonsils: No tonsillar exudate or tonsillar abscesses.      Comments: There is a slightly hoarse character to her voice  Eyes:      General: No scleral icterus.     Conjunctiva/sclera: Conjunctivae normal.   Cardiovascular:      Rate and Rhythm: Regular rhythm. Tachycardia present.      Heart sounds: Normal heart sounds.      Comments: Regular tachycardia at a rate of 110  Pulmonary:      Effort: No respiratory distress.      Breath sounds: Normal breath sounds.   Abdominal:      General: Abdomen is flat. Bowel sounds are normal.      Tenderness: There is abdominal tenderness in the epigastric area. There is no guarding or rebound.      Comments: Mild epigastric tenderness.  No guarding or rebound.  There is no lower abdominal tenderness.  Fetal Heart tones 140 at the bedside   Musculoskeletal:      Cervical back: Neck supple.      Right lower leg: " Tenderness present. No edema.      Left lower leg: Tenderness present. No edema.      Comments: All of her extremities are tender to the touch, no edema   Skin:     General: Skin is warm.      Findings: No rash.   Neurological:      General: No focal deficit present.      Mental Status: She is alert and oriented to person, place, and time.      Comments: Normal reflexes no clonus           ED Course, Procedures, & Data      Procedures            EKG Interpretation:      Interpreted by Michel Haddad MD  Time reviewed: 1442  Symptoms at time of EKG: Cough, chest pain  Rhythm: sinus tachycardia  Rate: 100-110  Axis: Normal  Ectopy: none  Conduction: normal  ST Segments/ T Waves: No ST-T wave changes and No acute ischemic changes  Q Waves: none  Comparison to prior: No old EKG available    Clinical Impression: Sinus tachycardia, rate 110, otherwise normal EKG                 Results for orders placed or performed during the hospital encounter of 01/14/25   Influenza A/B, RSV and SARS-CoV2 PCR (COVID-19) Nasopharyngeal     Status: Abnormal    Specimen: Nasopharyngeal; Swab   Result Value Ref Range    Influenza A PCR Positive (A) Negative    Influenza B PCR Negative Negative    RSV PCR Negative Negative    SARS CoV2 PCR Negative Negative    Narrative    Testing was performed using the Xpert Xpress CoV2/Flu/RSV Assay on the [employer] GeneXpert Instrument. This test should be ordered for the detection of SARS-CoV2, influenza, and RSV viruses in individuals with signs and symptoms of respiratory tract infection. This test is for in vitro diagnostic use under the [place] FDA for laboratories certified under CLIA to perform high or moderate complexity testing. This test has been [place] FDA cleared. A negative result does not rule out the presence of PCR inhibitors in the specimen or target RNA in concentration below the limit of detection for the assay. If only one viral target is positive but coinfection with multiple targets is  suspected, the sample should be re-tested with another FDA cleared, approved, or authorized test, if coninfection would change clinical management. This test was validated by the [facility]. These laboratories are certified under the Clinical Laboratory Improvement Amendments of 1988 (CLIA-88) as qualified to perfom high complexity laboratory testing.   Comprehensive metabolic panel     Status: Abnormal   Result Value Ref Range    Sodium 134 (L) 135 - 145 mmol/L    Potassium 4.0 3.4 - 5.3 mmol/L    Carbon Dioxide (CO2) 21 (L) 22 - 29 mmol/L    Anion Gap 12 7 - 15 mmol/L    Urea Nitrogen 6.6 6.0 - 20.0 mg/dL    Creatinine 0.60 0.51 - 0.95 mg/dL    GFR Estimate >90 >60 mL/min/1.73m2    Calcium 8.8 8.8 - 10.4 mg/dL    Chloride 101 98 - 107 mmol/L    Glucose 82 70 - 99 mg/dL    Alkaline Phosphatase 77 40 - 150 U/L    AST 18 0 - 45 U/L    ALT 13 0 - 50 U/L    Protein Total 6.6 6.4 - 8.3 g/dL    Albumin 3.6 3.5 - 5.2 g/dL    Bilirubin Total 0.2 <=1.2 mg/dL   Lipase     Status: Normal   Result Value Ref Range    Lipase 25 13 - 60 U/L   Magnesium     Status: Normal   Result Value Ref Range    Magnesium 1.7 1.7 - 2.3 mg/dL   Troponin T, High Sensitivity     Status: Normal   Result Value Ref Range    Troponin T, High Sensitivity <6 <=14 ng/L   CBC with platelets and differential     Status: Abnormal   Result Value Ref Range    WBC Count 9.1 4.0 - 11.0 10e3/uL    RBC Count 3.62 (L) 3.80 - 5.20 10e6/uL    Hemoglobin 11.7 11.7 - 15.7 g/dL    Hematocrit 35.1 35.0 - 47.0 %    MCV 97 78 - 100 fL    MCH 32.3 26.5 - 33.0 pg    MCHC 33.3 31.5 - 36.5 g/dL    RDW 13.2 10.0 - 15.0 %    Platelet Count 299 150 - 450 10e3/uL    % Neutrophils 79 %    % Lymphocytes 9 %    % Monocytes 10 %    % Eosinophils 2 %    % Basophils 0 %    % Immature Granulocytes 1 %    NRBCs per 100 WBC 0 <1 /100    Absolute Neutrophils 7.2 1.6 - 8.3 10e3/uL    Absolute Lymphocytes 0.8 0.8 - 5.3 10e3/uL    Absolute Monocytes 0.9 0.0 - 1.3 10e3/uL     Absolute Eosinophils 0.1 0.0 - 0.7 10e3/uL    Absolute Basophils 0.0 0.0 - 0.2 10e3/uL    Absolute Immature Granulocytes 0.1 <=0.4 10e3/uL    Absolute NRBCs 0.0 10e3/uL   Extra Tube     Status: None    Narrative    The following orders were created for panel order Extra Tube.  Procedure                               Abnormality         Status                     ---------                               -----------         ------                     Extra Blue Top Tube[[id number]]                              Final result               Extra Red Top Tube[[id number]]                               Final result                 Please view results for these tests on the individual orders.   Extra Blue Top Tube     Status: None   Result Value Ref Range    Hold Specimen JIC    Extra Red Top Tube     Status: None   Result Value Ref Range    Hold Specimen JIC    CBC with platelets differential     Status: Abnormal    Narrative    The following orders were created for panel order CBC with platelets differential.  Procedure                               Abnormality         Status                     ---------                               -----------         ------                     CBC with platelets and d...[[id number]]  Abnormal            Final result                 Please view results for these tests on the individual orders.     Medications   ondansetron (ZOFRAN) injection 4 mg (has no administration in time range)   sodium chloride 0.9% BOLUS 1,000 mL (1,000 mLs Intravenous $New Bag 1/14/25 1636)   acetaminophen (TYLENOL) tablet 1,000 mg (1,000 mg Oral $Given 1/14/25 1511)   sodium chloride 0.9% BOLUS 1,000 mL (0 mLs Intravenous Stopped 1/14/25 1545)     Labs Ordered and Resulted from Time of ED Arrival to Time of ED Departure   INFLUENZA A/B, RSV AND SARS-COV2 PCR - Abnormal       Result Value    Influenza A PCR Positive (*)     Influenza B PCR Negative      RSV PCR Negative      SARS CoV2 PCR Negative      COMPREHENSIVE METABOLIC PANEL - Abnormal    Sodium 134 (*)     Potassium 4.0      Carbon Dioxide (CO2) 21 (*)     Anion Gap 12      Urea Nitrogen 6.6      Creatinine 0.60      GFR Estimate >90      Calcium 8.8      Chloride 101      Glucose 82      Alkaline Phosphatase 77      AST 18      ALT 13      Protein Total 6.6      Albumin 3.6      Bilirubin Total 0.2     CBC WITH PLATELETS AND DIFFERENTIAL - Abnormal    WBC Count 9.1      RBC Count 3.62 (*)     Hemoglobin 11.7      Hematocrit 35.1      MCV 97      MCH 32.3      MCHC 33.3      RDW 13.2      Platelet Count 299      % Neutrophils 79      % Lymphocytes 9      % Monocytes 10      % Eosinophils 2      % Basophils 0      % Immature Granulocytes 1      NRBCs per 100 WBC 0      Absolute Neutrophils 7.2      Absolute Lymphocytes 0.8      Absolute Monocytes 0.9      Absolute Eosinophils 0.1      Absolute Basophils 0.0      Absolute Immature Granulocytes 0.1      Absolute NRBCs 0.0     LIPASE - Normal    Lipase 25     MAGNESIUM - Normal    Magnesium 1.7     TROPONIN T, HIGH SENSITIVITY - Normal    Troponin T, High Sensitivity <6     ROUTINE UA WITH MICROSCOPIC REFLEX TO CULTURE     No orders to display          Critical care was not performed.     Medical Decision Making  The patient's presentation was of moderate complexity (an acute illness with systemic symptoms).    The patient's evaluation involved:  review of external note(s) from 1 sources (review of notes from E visit 1/13/2025, and review of most recent OB clinic November 2024)  ordering and/or review of 3+ test(s) in this encounter (see separate area of note for details)    The patient's management necessitated moderate risk (prescription drug management including medications given in the ED).    Assessment & Plan    Otherwise healthy 29-year-old female who is approximately 30 weeks pregnant presenting with multiple complaints including cough, sore throat, myalgias, nausea and vomiting, shortness of  breath.  Differential diagnosis initially considered includes acute viral respiratory illness such as COVID-19 or other more common viral illness, bacterial upper respiratory infection, bronchitis, sinusitis, pharyngitis, pneumonia, seasonal allergies, clinically much less likely pneumothorax, PE, CHF, myocarditis, pericarditis, atypical presentation of ACS.    On exam the patient is in no respiratory distress but is tired appearing.  Her mucous members are dry.  There is mild erythema of her posterior pharynx.  Her lungs are clear.  She is tachycardic.  Cardiovascular exam is otherwise normal.  Abdomen is gravid, nontender with normal fetal heart tones at the bedside.  No peripheral edema, no palpable venous cords.  EKG sinus tach otherwise normal.  Was given Tylenol, IV fluids.  Is feeling better now eating a [employer] sandwich.  Vital signs improved.  No respiratory distress.  Her labs including troponin are negative with the exception that she is influenza A positive.  Otherwise healthy patient who is 30 weeks pregnant presents with an acute respiratory illness, no hypoxia, no signs of lower tract disease, improved with symptomatic treatment and found to be influenza A positive.  There is not appear to be any apparent complication of the pregnancy as she is experiencing fetal movement, has no bleeding or discharge, has a nontender abdomen, and has normal fetal heart tones at the bedside.  I see no indication for further obstetric evaluation.  Plan to start Tamiflu, as needed Tylenol and Zofran, lozenges, cough suppressants, close OB/GYN follow-up as needed.  We discussed the indications for emergency department return and follow-up.  Stable for discharge.        I have reviewed the nursing notes. I have reviewed the findings, diagnosis, plan and need for follow up with the patient.    New Prescriptions    ACETAMINOPHEN (TYLENOL) 500 MG TABLET    Take 1-2 tablets (500-1,000 mg) by mouth every 6 hours as needed for  fever or pain.    ONDANSETRON (ZOFRAN ODT) 4 MG ODT TAB    Take 1 tablet (4 mg) by mouth every 8 hours as needed for nausea.    OSELTAMIVIR (TAMIFLU) 75 MG CAPSULE    Take 1 capsule (75 mg) by mouth 2 times daily for 5 days.       Final diagnoses:   Influenza A   Dehydration       Michel Haddad MD  [facility] EMERGENCY DEPARTMENT  1/14/2025     Michel Haddad MD  01/14/25 6726

## 2025-01-15 LAB
ATRIAL RATE - MUSE: 108 BPM
DIASTOLIC BLOOD PRESSURE - MUSE: NORMAL MMHG
INTERPRETATION ECG - MUSE: NORMAL
P AXIS - MUSE: 62 DEGREES
PR INTERVAL - MUSE: 130 MS
QRS DURATION - MUSE: 72 MS
QT - MUSE: 308 MS
QTC - MUSE: 412 MS
R AXIS - MUSE: 47 DEGREES
SYSTOLIC BLOOD PRESSURE - MUSE: NORMAL MMHG
T AXIS - MUSE: 36 DEGREES
VENTRICULAR RATE- MUSE: 108 BPM

## 2025-01-16 ENCOUNTER — PRENATAL OFFICE VISIT (OUTPATIENT)
Dept: OBGYN | Facility: CLINIC | Age: 30
End: 2025-01-16
Attending: ADVANCED PRACTICE MIDWIFE

## 2025-01-16 VITALS
BODY MASS INDEX: 35.77 KG/M2 | DIASTOLIC BLOOD PRESSURE: 75 MMHG | SYSTOLIC BLOOD PRESSURE: 109 MMHG | HEART RATE: 106 BPM | WEIGHT: 221.6 LBS

## 2025-01-16 DIAGNOSIS — O09.90 HIGH-RISK PREGNANCY, UNSPECIFIED TRIMESTER: Primary | ICD-10-CM

## 2025-01-16 PROCEDURE — 99213 OFFICE O/P EST LOW 20 MIN: CPT | Performed by: ADVANCED PRACTICE MIDWIFE

## 2025-01-16 NOTE — LETTER
January 16, 2025        Supriya Feng  [address] [place] [address] [address]   [facility] [id number]    To Whom it May Concern:    Supriya Feng was seen in our office on 1/16/2025 and was given the following instructions:  Patient may return to work on 1/22/25 if she has resolution of her current illness.    Sincerely,        Francoise Hogue CNM

## 2025-01-20 PROBLEM — O09.90 HIGH-RISK PREGNANCY, UNSPECIFIED TRIMESTER: Status: ACTIVE | Noted: 2025-01-20

## 2025-01-20 NOTE — PROGRESS NOTES
Subjective:      29 year old  at 30w1d presents for a routine prenatal appointment.     no vaginal bleeding or leakage of fluid.  Denies concerning contractions or cramping.   Reports regular fetal movement.       No HA, visual changes, RUQ or epigastric pain.    Patient concerns: needs EOB and labs       Objective:  Vitals:    25 1604   BP: 109/75   Pulse: 106   Weight: 100.5 kg (221 lb 9.6 oz)   , see ob flowsheet    Blood type: A POS   Assessment/Plan     Patient Active Problem List    Diagnosis Date Noted    High-risk pregnancy, unspecified trimester 2025     Priority: High     [facility] Provider [facility] choice: MD group ?  Partner's name: Sae  Employment:  [x]NOB folder  [x]Dating  [x] 1st trimester screening: MFM referral placed for 1st trimester screening place  []Fetal anatomy US ordered  [] No added risk for PRE-E  []No need for utox in labor  []COVID vaccine completed  []Pap    12-23wks________________________    []Rubella immune  []Hep B immune   []Varicella immune  []FLU shot    24-28wk_________________________  []EOB folder  []Labor plans:  []Prenatal Ed  []:  []Infant feeding plan  [][place] care provider choice  []PP Contraception plan: If tubal,consent date:  []TDAP   []Rhogam if needed, date:    29-35 wk________________________  []TOLAC consent done  [] Water birth interest  []GCT nml results  []RSV    36-37 wks______________________   [] GBS/CBC  []OTC PP meds sent  []PP recovery plans/support:  []Planning CS-ERAS pkt    38-42 wks______________________  []IOL reason/plans  []Postdates BPP        History of shoulder dystocia in prior pregnancy 2024     Priority: Medium     2014: 3.9k       Major depression 2015     Priority: Medium    Low back pain 2014     Priority: Medium    History of cigar smoking 2014     Priority: Medium    Adjustment disorder with mixed anxiety and depressed mood 2010     Priority: Medium       No  orders of the defined types were placed in this encounter.      Updated individualized prenatal care plan on the problem list for 3rd trimester    Return to clinic in 1 weeks for EOB and labs and prn if questions or concerns.     MEENU Levy CNM

## 2025-01-21 ENCOUNTER — LAB (OUTPATIENT)
Dept: LAB | Facility: CLINIC | Age: 30
End: 2025-01-21
Attending: ADVANCED PRACTICE MIDWIFE

## 2025-01-21 ENCOUNTER — PRENATAL OFFICE VISIT (OUTPATIENT)
Dept: OBGYN | Facility: CLINIC | Age: 30
End: 2025-01-21
Attending: ADVANCED PRACTICE MIDWIFE

## 2025-01-21 VITALS
DIASTOLIC BLOOD PRESSURE: 72 MMHG | BODY MASS INDEX: 35.75 KG/M2 | SYSTOLIC BLOOD PRESSURE: 105 MMHG | HEART RATE: 88 BPM | WEIGHT: 221.5 LBS

## 2025-01-21 DIAGNOSIS — O09.90 HIGH-RISK PREGNANCY, UNSPECIFIED TRIMESTER: ICD-10-CM

## 2025-01-21 DIAGNOSIS — M25.552 HIP PAIN, BILATERAL: ICD-10-CM

## 2025-01-21 DIAGNOSIS — O09.90 HIGH-RISK PREGNANCY, UNSPECIFIED TRIMESTER: Primary | ICD-10-CM

## 2025-01-21 DIAGNOSIS — M25.551 HIP PAIN, BILATERAL: ICD-10-CM

## 2025-01-21 DIAGNOSIS — Z87.59 HISTORY OF SHOULDER DYSTOCIA IN PRIOR PREGNANCY: ICD-10-CM

## 2025-01-21 LAB
ERYTHROCYTE [DISTWIDTH] IN BLOOD BY AUTOMATED COUNT: 12.7 % (ref 10–15)
GLUCOSE 1H P 50 G GLC PO SERPL-MCNC: 118 MG/DL (ref 70–129)
HCT VFR BLD AUTO: 34.6 % (ref 35–47)
HGB BLD-MCNC: 11.6 G/DL (ref 11.7–15.7)
MCH RBC QN AUTO: 32.5 PG (ref 26.5–33)
MCHC RBC AUTO-ENTMCNC: 33.5 G/DL (ref 31.5–36.5)
MCV RBC AUTO: 97 FL (ref 78–100)
PLATELET # BLD AUTO: 339 10E3/UL (ref 150–450)
RBC # BLD AUTO: 3.57 10E6/UL (ref 3.8–5.2)
T PALLIDUM AB SER QL: NONREACTIVE
WBC # BLD AUTO: 9 10E3/UL (ref 4–11)

## 2025-01-21 PROCEDURE — 99213 OFFICE O/P EST LOW 20 MIN: CPT | Performed by: ADVANCED PRACTICE MIDWIFE

## 2025-01-21 PROCEDURE — 86780 TREPONEMA PALLIDUM: CPT

## 2025-01-21 PROCEDURE — 36415 COLL VENOUS BLD VENIPUNCTURE: CPT

## 2025-01-21 PROCEDURE — 99207 PR PRENATAL VISIT: CPT | Performed by: ADVANCED PRACTICE MIDWIFE

## 2025-01-21 PROCEDURE — 82950 GLUCOSE TEST: CPT

## 2025-01-21 PROCEDURE — 85027 COMPLETE CBC AUTOMATED: CPT

## 2025-01-21 PROCEDURE — 82306 VITAMIN D 25 HYDROXY: CPT

## 2025-01-21 NOTE — PATIENT INSTRUCTIONS
Thank you for trusting us with your care!   Please be aware, if you are on Mychart, you may see your results prior to your providers review. If labs are abnormal, we will call or message you on [employer] with a follow up plan.    If you need to contact us for questions about:  Symptoms, Scheduling & Medical Questions; Non-urgent (2-3 day response) SmartPill message, Urgent (needing response today) [phone] (if after 3:30pm next day response)   Prescriptions: Please call your Pharmacy   Billing: Alexi [phone] or [facility]:[phone]

## 2025-01-22 LAB — VIT D+METAB SERPL-MCNC: 30 NG/ML (ref 20–50)

## 2025-01-28 ENCOUNTER — THERAPY VISIT (OUTPATIENT)
Dept: PHYSICAL THERAPY | Facility: CLINIC | Age: 30
End: 2025-01-28
Attending: ADVANCED PRACTICE MIDWIFE

## 2025-01-28 DIAGNOSIS — O09.90 HIGH-RISK PREGNANCY, UNSPECIFIED TRIMESTER: ICD-10-CM

## 2025-01-28 DIAGNOSIS — R10.2 PAIN OF PELVIC GIRDLE: Primary | ICD-10-CM

## 2025-01-28 DIAGNOSIS — M25.551 HIP PAIN, BILATERAL: ICD-10-CM

## 2025-01-28 DIAGNOSIS — M25.552 HIP PAIN, BILATERAL: ICD-10-CM

## 2025-01-28 PROCEDURE — 97161 PT EVAL LOW COMPLEX 20 MIN: CPT | Mod: GP | Performed by: PHYSICAL THERAPIST

## 2025-01-28 PROCEDURE — 97530 THERAPEUTIC ACTIVITIES: CPT | Mod: GP | Performed by: PHYSICAL THERAPIST

## 2025-01-28 ASSESSMENT — ACTIVITIES OF DAILY LIVING (ADL)
HEAVY_WORK: UNABLE TO DO
WALKING_INITIALLY: SLIGHT DIFFICULTY
TWISTING/PIVOTING_ON_INVOLVED_LEG: EXTREME DIFFICULTY
PUTTING_ON_SOCKS_AND_SHOES: EXTREME DIFFICULTY
HOS_ADL_SCORE(%): 31.25
ROLLING_OVER_IN_BED: EXTREME DIFFICULTY
SPORTS_HIGHEST_POTENTIAL_SCORE: INCOMPLETE
STANDING_FOR_15_MINUTES: MODERATE DIFFICULTY
WALKING_APPROXIMATELY_10_MINUTES: MODERATE DIFFICULTY
RECREATIONAL_ACTIVITIES: UNABLE TO DO
GETTING_INTO_AND_OUT_OF_AN_AVERAGE_CAR: MODERATE DIFFICULTY
HOW_WOULD_YOU_RATE_YOUR_CURRENT_LEVEL_OF_FUNCTION_DURING_YOUR_USUAL_ACTIVITIES_OF_DAILY_LIVING_FROM_0_TO_100_WITH_100_BEING_YOUR_LEVEL_OF_FUNCTION_PRIOR_TO_YOUR_HIP_PROBLEM_AND_0_BEING_THE_INABILITY_TO_PERFORM_ANY_OF_YOUR_USUAL_DAILY_ACTIVITIES?: 82
HOS_ADL_ITEM_SCORE_TOTAL: 20
PUTTING ON SOCKS AND SHOES: EXTREME DIFFICULTY
WALKING_UP_STEEP_HILLS: EXTREME DIFFICULTY
GETTING_INTO_AND_OUT_OF_A_BATHTUB: EXTREME DIFFICULTY
GETTING_INTO_AND_OUT_OF_A_BATHTUB: EXTREME DIFFICULTY
WALKING_UP_STEEP_HILLS: EXTREME DIFFICULTY
TWISTING/PIVOTING ON INVOLVED LEG: EXTREME DIFFICULTY
HOW_WOULD_YOU_RATE_YOUR_CURRENT_LEVEL_OF_FUNCTION_DURING_YOUR_USUAL_ACTIVITIES_OF_DAILY_LIVING_FROM_0_TO_100_WITH_100_BEING_YOUR_LEVEL_OF_FUNCTION_PRIOR_TO_YOUR_HIP_PROBLEM_AND_0_BEING_THE_INABILITY_TO_PERFORM_ANY_OF_YOUR_USUAL_DAILY_ACTIVITIES?: 82
WALKING_15_MINUTES_OR_GREATER: MODERATE DIFFICULTY
SITTING_FOR_15_MINUTES: MODERATE DIFFICULTY
DEEP SQUATTING: UNABLE TO DO
ADL_COUNT: 16
DEEP_SQUATTING: UNABLE TO DO
WALKING_15_MINUTES_OR_GREATER: MODERATE DIFFICULTY
HEAVY_WORK: UNABLE TO DO
SITTING FOR 15 MINUTES: MODERATE DIFFICULTY
GOING_UP_1_FLIGHT_OF_STAIRS: EXTREME DIFFICULTY
SPORTS_COUNT: INCOMPLETE
GETTING INTO AND OUT OF AN AVERAGE CAR: MODERATE DIFFICULTY
SPORTS_SCORE(%): INCOMPLETE
ADL_HIGHEST_POTENTIAL_SCORE: 64
RECREATIONAL ACTIVITIES: UNABLE TO DO
GOING UP 1 FLIGHT OF STAIRS: EXTREME DIFFICULTY
HOW_WOULD_YOU_RATE_YOUR_CURRENT_LEVEL_OF_FUNCTION?: ABNORMAL
LIGHT_TO_MODERATE_WORK: MODERATE DIFFICULTY
STEPPING UP AND DOWN CURBS: EXTREME DIFFICULTY
GOING DOWN 1 FLIGHT OF STAIRS: EXTREME DIFFICULTY
ADL_SCORE(%): INCOMPLETE
WALKING_FOR_APPROXIMATELY_10_MINUTES: MODERATE DIFFICULTY
HOS_ADL_HIGHEST_POTENTIAL_SCORE: 64
LIGHT_TO_MODERATE_WORK: MODERATE DIFFICULTY
PLEASE_INDICATE_YOR_PRIMARY_REASON_FOR_REFERRAL_TO_THERAPY:: HIP
SPORTS_TOTAL_ITEM_SCORE: INCOMPLETE
ADL_TOTAL_ITEM_SCORE: INCOMPLETE
ROLLING OVER IN BED: EXTREME DIFFICULTY
STANDING FOR 15 MINUTES: MODERATE DIFFICULTY
WALKING_INITIALLY: SLIGHT DIFFICULTY
STEPPING_UP_AND_DOWN_CURBS: EXTREME DIFFICULTY
GOING_DOWN_1_FLIGHT_OF_STAIRS: EXTREME DIFFICULTY

## 2025-01-28 NOTE — PROGRESS NOTES
PHYSICAL THERAPY EVALUATION  Type of Visit: Evaluation              Subjective   Patient reports left anterior hip pain that has been ongoing since pregnancy started and worsening in the last two months.  Works at [employer] and has to stand for long periods, hard to move about jail through shift.  Has had a few instances of walking and felt like she locked up and couldn't move.  She does have low back pain that hurts when hip hurts.  Has a brace, hasn't tried it yet.  Rolling out of bed and getting off floor is hard.   doesn't remember the first pregnancy if she had pain, 31w6d today.  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        Presenting condition or subjective complaint: unable to walk or move due to hip pain  Date of onset: 24    Relevant medical history: Concussions   Dates & types of surgery:      Prior diagnostic imaging/testing results: X-ray     Prior therapy history for the same diagnosis, illness or injury: No      Prior Level of Function  Transfers: Independent  Ambulation: Independent  ADL: Independent  IADL: Driving, Finances, Housekeeping, Laundry, Meal preparation, Medication management, Work    Living Environment  Social support: With a significant other or spouse   Type of home: Apartment/condo   Stairs to enter the home:         Ramp: No   Stairs inside the home: Yes 15 Is there a railing: Yes     Help at home: Self Cares (home health aide/personal care attendant, family, etc); Home management tasks (cooking, cleaning); Meals on wheels/meal service  Equipment owned:       Employment: Yes Hospitality Supervisor  Hobbies/Interests: Being a mom    Patient goals for therapy: stand longer than 20 minutes,kaitlin able to get out of bed    Pain assessment: See objective evaluation for additional pain details     Objective   HIP EVALUATION  PAIN: Pain Level at Rest: 2/10  Pain Level with Use: 10/10  Pain Location: lumbar spine and hip  Pain Quality:  Aching and Stabbing  Pain Frequency: intermittent  Pain is Worst: daytime  Pain is Exacerbated By: standing, walking  Pain is Relieved By: sitting, getting up from floor, rolling over in bed   Pain Progression: Worsened  INTEGUMENTARY (edema, incisions): WFL  POSTURE: Standing Posture: Lordosis increased, Thoracic kyphosis increased  GAIT:   Weightbearing Status: WBAT  Assistive Device(s): None  Gait Deviations: Antalgic  BALANCE/PROPRIOCEPTION:   WEIGHTBEARING ALIGNMENT: Lumbar/Pelvic WB Alignment:R anterior innominate rotation   NON-WEIGHTBEARING ALIGNMENT:    ROM:   (Degrees) Left AROM Left PROM  Right AROM Right PROM   Hip Flexion       Hip Extension       Hip Abduction       Hip Adduction       Hip Internal Rotation       Hip External Rotation       Knee Flexion       Knee Extension       Lumbar Side glide WFL WFL   Lumbar Flexion To mid shin   Lumbar Extension 50% with pain    Pain:   End feel:     PELVIC/SI SCREEN:  (+) SI palpation, (+) ASLR  STRENGTH:   Pain: - none + mild ++ moderate +++ severe  Strength Scale: 0-5/5 Left Right   Hip Flexion 4 4+   Hip Extension     Hip Abduction Unable to assess due to pain     Hip Adduction     Hip Internal Rotation     Hip External Rotation     Knee Flexion     Knee Extension       LE FLEXIBILITY:   SPECIAL TESTS:   FUNCTIONAL TESTS:  SLS difficult standing on L LE; difficult to roll over in bed with pain in SIJ   PALPATION:  TTP to B SIJ, long dorsal ligament   JOINT MOBILITY:     Assessment & Plan   CLINICAL IMPRESSIONS  Medical Diagnosis: High-risk pregnancy, unspecified trimester (O09.90), Hip pain, bilateral (M25.551, M25.552)    Treatment Diagnosis: pelvic girdle pain, hip pain   Impression/Assessment: Patient is a 29 year old female with pelvic girdle and low back complaints.  The following significant findings have been identified: Pain, Decreased ROM/flexibility, Decreased joint mobility, Decreased strength, Impaired muscle performance, and Decreased activity  tolerance. These impairments interfere with their ability to perform self care tasks and recreational activities as compared to previous level of function.     Clinical Decision Making (Complexity):  Clinical Presentation: Stable/Uncomplicated  Clinical Presentation Rationale: based on medical and personal factors listed in PT evaluation  Clinical Decision Making (Complexity): Low complexity    PLAN OF CARE  Treatment Interventions:  Modalities: Cupping  Interventions: Manual Therapy, Neuromuscular Re-education, Therapeutic Activity, Therapeutic Exercise, Self-Care/Home Management    Long Term Goals     PT Goal 1  Goal Identifier: LTG 1  Goal Description: Patient will report ability to work for 6 hours with no increase in pain  Rationale: to maximize safety and independence with performance of ADLs and functional tasks  Target Date: 04/08/25      Frequency of Treatment: every week  Duration of Treatment: 10 weeks    Recommended Referrals to Other Professionals:  No further referral needed   Education Assessment:   Learner/Method: Patient;No Barriers to Learning    Risks and benefits of evaluation/treatment have been explained.   Patient/Family/caregiver agrees with Plan of Care.     Evaluation Time:     PT Eval, Low Complexity Minutes (24563): 15  Evaluation Only     Signing Clinician: Kisha Shanks, PT          [facility]                                                                                   OUTPATIENT PHYSICAL THERAPY    PLAN OF TREATMENT FOR OUTPATIENT REHABILITATION   Patient's Last Name, First Name, Supriya Cristobal YOB: 1995   Provider's Name   [facility]   Medical Record No.  [id number]     Onset Date: 01/21/25  Start of Care Date: 01/28/25     Medical Diagnosis:  High-risk pregnancy, unspecified trimester (O09.90), Hip pain, bilateral (M25.551, M25.552)      PT Treatment Diagnosis:  pelvic girdle pain, hip pain  Plan of Treatment  Frequency/Duration: every week/ 10 weeks    Certification date from 01/28/25 to 04/08/25         See note for plan of treatment details and functional goals     Kisha Shanks, PT                         I CERTIFY THE NEED FOR THESE SERVICES FURNISHED UNDER        THIS PLAN OF TREATMENT AND WHILE UNDER MY CARE     (Physician attestation of this document indicates review and certification of the therapy plan).              Referring Provider:  Francoise Hogue    Initial Assessment  See Epic Evaluation- Start of Care Date: 01/28/25

## 2025-01-30 PROBLEM — R10.2 PAIN OF PELVIC GIRDLE: Status: ACTIVE | Noted: 2025-01-30

## 2025-01-30 PROBLEM — M25.552 HIP PAIN, BILATERAL: Status: ACTIVE | Noted: 2025-01-30

## 2025-01-30 PROBLEM — M25.551 HIP PAIN, BILATERAL: Status: ACTIVE | Noted: 2025-01-30

## 2025-02-10 NOTE — PROGRESS NOTES
30 year old  at 30w6d presents for a routine prenatal appointment.   No vaginal bleeding or leakage of fluid. No contractions or cramping.   Positive fetal movement.       No HA, visual changes, RUQ or epigastric pain.  Concerns: hip pain    EOB folder given    Education completed today .     [place] Feeding plans :    Contraception planned:  nexplanon  Water birth consent form was not given.  Blood type: A POS    Antibody Screen   Date Value Ref Range Status   2024 Negative Negative Final   Rhogam  was not given.     Objective:  Vitals:    25 1416   BP: 105/72   Pulse: 88   Weight: 100.5 kg (221 lb 8 oz)     See OB flowsheet  Blood type: A POS         2023     2:58 PM 2023     7:48 AM 2024    11:03 AM   PHQ   PHQ-9 Total Score 4 0 11   Q9: Thoughts of better off dead/self-harm past 2 weeks Not at all Not at all Not at all         A/P:  Patient Active Problem List    Diagnosis Date Noted    High-risk pregnancy, unspecified trimester 2025     Priority: High     [facility] Patient Provider Group choice: MD group ?  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  [x]Dating  [x] 1st trimester screening: MFM referral placed for 1st trimester screening place  [x]Fetal anatomy US ordered  [x] No added risk for PRE-E  [x]No need for utox in labor  []COVID vaccine completed  []Pap    12-23wks________________________    [x]Rubella immune  [x]Hep B NONimmune   [x]Varicella immune  []FLU shot    24-28wk_________________________  [x]EOB folder  [x]Labor plans:FOB, his mom  [x]Prenatal Ed  [x]: no  [x]Infant feeding plan--breast  [x][place] care provider choice: [facility]  [x]PP Contraception likely nexplanon  [x]TDAP declines      29-35 wk________________________    [] Water birth interest  []GCT nml results  []RSV    36-37 wks______________________   [] GBS/CBC  []OTC PP meds sent  []PP recovery  plans/support:  []Planning CS-ERAS pkt    38-42 wks______________________  []IOL reason/plans  []Postdates BPP        Hip pain, bilateral 01/30/2025     Priority: Medium    Pain of pelvic girdle 01/30/2025     Priority: Medium    History of shoulder dystocia in prior pregnancy 09/09/2024     Priority: Medium     2014: 3.9k   She began pushing at 1945 with good effort. Upon delivery of the fetal head, a shoulder dystocia was noted. The left shoulder was anterior. Oxana maneuver was performed and the baby delivered in under 30 seconds from the time the dystocia was called. No suprapubic pressure was applied. The infant was noted to be moving both arms spontaneously after delivery.       Major depression 06/11/2015     Priority: Medium    Low back pain 09/27/2014     Priority: Medium    History of cigar smoking 04/29/2014     Priority: Medium    Adjustment disorder with mixed anxiety and depressed mood 03/23/2010     Priority: Medium       Orders Placed This Encounter   Procedures    Glucose tolerance gest screen 1 hour    Treponema Abs w Reflex to RPR and Titer    CBC with platelets    Vitamin D Deficiency    Physical Therapy  Referral       Updated individualized prenatal care plan on the problem list for 24-28weeks  Continue scheduled prenatal care  Encouraged regular visits  MEENU Levy CNM

## 2025-02-12 ENCOUNTER — TELEPHONE (OUTPATIENT)
Dept: OBGYN | Facility: CLINIC | Age: 30
End: 2025-02-12

## 2025-02-12 NOTE — TELEPHONE ENCOUNTER
[facility]    Phone Message    May a detailed message be left on voicemail: yes     Reason for Call: Form or Letter   Type or form/letter needing completion: Maternity Leave Forms  Provider: Francoise Hogue  Date form needed: asap - Pt is requesting if she is able to drop off her maternity forms to be completed. Pt states she will be able to drop them off tomorrow. Pt is unsure yet if she can make it to her appt as pts insurance is not updated and she is waiting to see how much appts costs, writer sent pt to Cost of Care to discuss and pt will call back to if she needs to cancel her appt. Pt requested this message be sent to Francoise. Thank you.    Once completed: Patient will  at .    Action Taken: Other: [facility]    Travel Screening: Not Applicable     Date of Service:

## 2025-02-12 NOTE — PATIENT INSTRUCTIONS
Weeks 34 to 36 of Your Pregnancy: Care Instructions  Your belly has grown quite large. It's almost time to give birth! Your baby's lungs are almost ready to breathe air. The skull bones are firm enough to protect your baby's head. But they're soft enough to move down through the birth canal.    You might be wondering what to expect during labor. Because each birth is different, there's no way to know exactly what childbirth will be like for you. Talk to your doctor or midwife about any concerns you have.   You'll probably have a test for group B streptococcus (GBS). GBS is bacteria that can live in the vagina and rectum. GBS can make your baby sick after birth. If you test positive, you'll get antibiotics during labor.     Choose what type of pain relief you would like during labor.  You can choose from a few types, including medicine and non-medicine options. You may want to use several types of pain relief.     Know how medicines can help with pain during labor.  Some medicines lower anxiety and help with some of the pain. Others make your lower body numb so that you will feel less pain.     Tell your doctor about your pain medicine choice.  Do this before you start labor or very early in your labor. You may be able to change your mind during labor.     Learn about the stages of labor.    The first stage includes the early (latent) and active phases of labor. Contractions start in early labor. During active labor, contractions get stronger, last longer, and happen more often. And the cervix opens more rapidly.  The second stage starts when you're ready to push. During this stage, your baby is born.  During the third stage, you'll usually have a few more contractions to push out the placenta.   Follow-up care is a key part of your treatment and safety. Be sure to make and go to all appointments, and call your doctor if you are having problems. It's also a good idea to know your test results and keep a list of the  "medicines you take.  Where can you learn more?  Go to https://www.[employer].net/patiented  Enter B912 in the search box to learn more about \"Weeks 34 to 36 of Your Pregnancy: Care Instructions.\"  Current as of: 2024  Content Version: 14.3    2024 [employer].   Care instructions adapted under license by your healthcare professional. If you have questions about a medical condition or this instruction, always ask your healthcare professional. [employer] disclaims any warranty or liability for your use of this information.    Group B Strep During Pregnancy: Care Instructions  Overview     Group B strep infection is caused by a type of bacteria. It's a different kind of bacteria than the kind that causes strep throat.  You may have this kind of bacteria in your body. Sometimes it may cause an infection, but most of the time it doesn't make you sick or cause symptoms. But if you pass the bacteria to your baby during the birth, it can cause serious health problems for your baby.  If you have this bacteria in your body, you will get antibiotics when you are in labor. Antibiotics help prevent problems for a  baby.  After birth, doctors will watch and may test your baby. If your baby tests positive for Group B strep, your baby will get antibiotics.  If you plan to breastfeed your baby, don't worry. It will be safe to breastfeed.  Follow-up care is a key part of your treatment and safety. Be sure to make and go to all appointments, and call your doctor if you are having problems. It's also a good idea to know your test results and keep a list of the medicines you take.  How can you care for yourself at home?  If your doctor has prescribed antibiotics, take them as directed. Do not stop taking them just because you feel better. You need to take the full course of antibiotics.  Tell your doctor if you are allergic to any antibiotic.  If you go into labor, or your water breaks, go to the " "hospital. Your doctor will give you antibiotics to help protect your baby from infection.  Tell the doctors and nurses if you have an allergy to penicillin.  Tell the doctors and nurses at the hospital that you tested positive for group B strep.  When should you call for help?   Call your doctor now or seek immediate medical care if:    You have symptoms of a urinary tract infection. These may include:  Pain or burning when you urinate.  A frequent need to urinate without being able to pass much urine.  Pain in the flank, which is just below the rib cage and above the waist on either side of the back.  Blood in your urine.  A fever.     You think you are in labor or your water has broken.     You have pain in your belly or pelvis.   Watch closely for changes in your health, and be sure to contact your doctor if you have any problems.  Where can you learn more?  Go to https://www.[employer]/patiented  Enter M001 in the search box to learn more about \"Group B Strep During Pregnancy: Care Instructions.\"  Current as of: April 30, 2024  Content Version: 14.3    2024 [employer].   Care instructions adapted under license by your healthcare professional. If you have questions about a medical condition or this instruction, always ask your healthcare professional. [employer] disclaims any warranty or liability for your use of this information.    Circumcision in Infants: What to Expect at Home  Your Child's Recovery  After circumcision, your baby's penis may look red and swollen. It may have petroleum jelly and gauze on it. The gauze will likely come off when your baby urinates. Follow your doctor's directions about whether to put clean gauze back on your baby's penis or to leave the gauze off. If you need to remove gauze from the penis, use warm water to soak the gauze and gently loosen it.  The doctor may have used a Plastibell device to do the circumcision. If so, your baby will have a plastic " ring around the head of the penis. The ring should fall off by itself in 10 to 12 days.  A thin, yellow film may form over the area the day after the procedure. This is part of the normal healing process. It should go away in a few days.  Your baby may seem fussy while the area heals. It may hurt for your baby to urinate. This pain often gets better in 3 or 4 days. But it may last for up to 2 weeks.  Even though your baby's penis will likely start to feel better after 3 or 4 days, it may look worse. The penis often starts to look like it's getting better after about 7 to 10 days.  This care sheet gives you a general idea about how long it will take for your child to recover. But each child recovers at a different pace. Follow the steps below to help your child get better as quickly as possible.  How can you care for your child at home?  Activity    Let your baby rest as much as possible. Sleeping will help with recovery.     You can give your baby a sponge bath the day after surgery. Ask your doctor when it is okay to give your baby a bath.   Medicines    Your doctor will tell you if and when your child can restart any medicines. The doctor will also give you instructions about your child taking any new medicines.     Your doctor may recommend giving your baby acetaminophen (Tylenol) to help with pain after the procedure. Be safe with medicines. Give your child medicines exactly as prescribed. Call your doctor if you think your child is having a problem with a medicine.     Do not give your child two or more pain medicines at the same time unless the doctor told you to. Many pain medicines have acetaminophen, which is Tylenol. Too much acetaminophen (Tylenol) can be harmful.   Circumcision care    Always wash your hands before and after touching the circumcision area.     Gently wash your baby's penis with plain, warm water after each diaper change, and pat it dry. Do not use soap. Don't use hydrogen peroxide or  "alcohol. They can slow healing.     Do not try to remove the film that forms on the penis. The film will go away on its own.     Put plenty of petroleum jelly (such as Vaseline) on the circumcision area during each diaper change. This will prevent your baby's penis from sticking to the diaper while it heals.     Fasten your baby's diapers loosely so that there is less pressure on the penis while it heals.   Follow-up care is a key part of your child's treatment and safety. Be sure to make and go to all appointments, and call your doctor if your child is having problems. It's also a good idea to know your child's test results and keep a list of the medicines your child takes.  When should you call for help?   Call your doctor now or seek immediate medical care if:    Your baby has a fever over 100.4 F.     Your baby is extremely fussy or irritable, has a high-pitched cry, or refuses to eat.     Your baby does not have a wet diaper within 12 hours after the circumcision.     You find a spot of bleeding larger than a 2-inch [place] from the incision.     Your baby has signs of infection. Signs may include severe swelling; redness; a red streak on the shaft of the penis; or a thick, yellow discharge.   Watch closely for changes in your child's health, and be sure to contact your doctor if:    A Plastibell device was used for the circumcision and the ring has not fallen off after 10 to 12 days.   Where can you learn more?  Go to https://www.[employer].net/patiented  Enter S255 in the search box to learn more about \"Circumcision in Infants: What to Expect at Home.\"  Current as of: October 24, 2023  Content Version: 14.3    2024 [employer].   Care instructions adapted under license by your healthcare professional. If you have questions about a medical condition or this instruction, always ask your healthcare professional. [employer] disclaims any warranty or liability for your use of this " information.

## 2025-02-13 ENCOUNTER — PRENATAL OFFICE VISIT (OUTPATIENT)
Dept: OBGYN | Facility: CLINIC | Age: 30
End: 2025-02-13
Attending: ADVANCED PRACTICE MIDWIFE

## 2025-02-13 VITALS
BODY MASS INDEX: 36.64 KG/M2 | HEIGHT: 66 IN | DIASTOLIC BLOOD PRESSURE: 75 MMHG | WEIGHT: 228 LBS | SYSTOLIC BLOOD PRESSURE: 119 MMHG | HEART RATE: 111 BPM

## 2025-02-13 DIAGNOSIS — O09.90 HIGH-RISK PREGNANCY, UNSPECIFIED TRIMESTER: Primary | ICD-10-CM

## 2025-02-13 DIAGNOSIS — Z87.59 HISTORY OF SHOULDER DYSTOCIA IN PRIOR PREGNANCY: ICD-10-CM

## 2025-02-13 PROCEDURE — 99211 OFF/OP EST MAY X REQ PHY/QHP: CPT | Performed by: ADVANCED PRACTICE MIDWIFE

## 2025-02-13 ASSESSMENT — PAIN SCALES - GENERAL: PAINLEVEL_OUTOF10: SEVERE PAIN (7)

## 2025-02-13 NOTE — PROGRESS NOTES
"Subjective:      30 year old  at 34w1d presents for a routine prenatal appointment.     no vaginal bleeding or leakage of fluid.  Denies concerning contractions or cramping.   Reports regular fetal movement.       No HA, visual changes, RUQ or epigastric pain.    Patient concerns: still struggling w SP pain , wearing abd belt helps some  Stopping work 3/4. Papers signed today       Objective:  Vitals:    25 1335   BP: 119/75   Pulse: 111   Weight: 103.4 kg (228 lb)   Height: 1.676 m (5' 5.98\")   , see ob flowsheet    Blood type: A POS   Assessment/Plan     Patient Active Problem List    Diagnosis Date Noted    High-risk pregnancy, unspecified trimester 2025     Priority: High     [facility] Patient Provider Group choice: MD group ?  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  [x]Dating  [x] 1st trimester screening: MFM referral placed for 1st trimester screening place  [x]Fetal anatomy US ordered  [x] No added risk for PRE-E  [x]No need for utox in labor  []COVID vaccine completed  []Pap    12-23wks________________________    [x]Rubella immune  [x]Hep B NONimmune   [x]Varicella immune  []FLU shot    24-28wk_________________________  [x]EOB folder  [x]Labor plans:FOB, his mom  [x]Prenatal Ed  [x]: no  [x]Infant feeding plan--breast  [x] care provider choice: probably [facility]  [x]PP Contraception likely nexplanon  [x]TDAP declines      29-35 wk________________________    [] Water birth interest  []GCT nml results  []RSV    36-37 wks______________________   [] GBS/CBC  []OTC PP meds sent  []PP recovery plans/support:  []Planning CS-ERAS pkt    38-42 wks______________________  []IOL reason/plans  []Postdates BPP        Hip pain, bilateral 2025     Priority: Medium    Pain of pelvic girdle 2025     Priority: Medium    History of shoulder dystocia in prior pregnancy 2024     Priority: Medium     2014: 3.9k "   She began pushing at 1945 with good effort. Upon delivery of the fetal head, a shoulder dystocia was noted. The left shoulder was anterior. Oxana maneuver was performed and the baby delivered in under 30 seconds from the time the dystocia was called. No suprapubic pressure was applied. The infant was noted to be moving both arms spontaneously after delivery.       Major depression 06/11/2015     Priority: Medium    Low back pain 09/27/2014     Priority: Medium    History of cigar smoking 04/29/2014     Priority: Medium    Adjustment disorder with mixed anxiety and depressed mood 03/23/2010     Priority: Medium       Orders Placed This Encounter   Procedures    US OB Follow Up >14 Weeks       Updated individualized prenatal care plan on the problem list for 3rd trimester    Return to clinic in 1 weeks and prn if questions or concerns.   Plan growth US next week  Francoise Hogue, APRN IGOR

## 2025-02-19 ENCOUNTER — ANCILLARY PROCEDURE (OUTPATIENT)
Dept: ULTRASOUND IMAGING | Facility: CLINIC | Age: 30
End: 2025-02-19
Attending: ADVANCED PRACTICE MIDWIFE

## 2025-02-19 DIAGNOSIS — O09.90 HIGH-RISK PREGNANCY, UNSPECIFIED TRIMESTER: ICD-10-CM

## 2025-02-19 DIAGNOSIS — Z87.59 HISTORY OF SHOULDER DYSTOCIA IN PRIOR PREGNANCY: ICD-10-CM

## 2025-02-19 PROCEDURE — 76816 OB US FOLLOW-UP PER FETUS: CPT | Mod: 26 | Performed by: STUDENT IN AN ORGANIZED HEALTH CARE EDUCATION/TRAINING PROGRAM

## 2025-02-19 PROCEDURE — 76816 OB US FOLLOW-UP PER FETUS: CPT

## 2025-03-03 NOTE — PATIENT INSTRUCTIONS
"Week 37 of Your Pregnancy: Care Instructions    Most babies are born between 37 and 40 weeks.   This is a good time to pack a bag to take with you to the birth. Then it will be ready to go when you are.     Learn about breastfeeding.  For example, find out about ways to hold your baby to make breastfeeding easier. And think about learning how to pump and store milk.     Know that crying is normal.  It's common for babies to cry 1 to 3 hours a day. Some cry more, and some cry less.     Learn why babies cry.  They may be hungry; have gas; need a diaper change; or feel cold, warm, tired, lonely, or tense. Sometimes they cry for unknown reasons.     Think about what will help you stay calm when your baby cries.  Taking slow, deep breaths can help. So can taking a break. It's okay to put your baby somewhere safe (like their crib) and walk away for a few minutes.     Learn about safe sleep for your baby.  Always put your baby to sleep on their back. Place them alone in a crib or bassinet with a firm, flat surface. Keep soft items like stuffed animals out of the crib.     Learn what to expect with  poop.  Your baby will have their own bowel patterns. Some babies have several bowel movements a day. Some have fewer.     Know that  babies will often have loose, yellow bowel movements.  Formula-fed babies have more formed stools. If your baby's poop looks like pellets, your baby is constipated.   Follow-up care is a key part of your treatment and safety. Be sure to make and go to all appointments, and call your doctor if you are having problems. It's also a good idea to know your test results and keep a list of the medicines you take.  Where can you learn more?  Go to https://www.[employer].net/patiented  Enter N257 in the search box to learn more about \"Week 37 of Your Pregnancy: Care Instructions.\"  Current as of: 2024  Content Version: 14.3    2024 [facility].   Care instructions " adapted under license by your healthcare professional. If you have questions about a medical condition or this instruction, always ask your healthcare professional. [employer] disclaims any warranty or liability for your use of this information.

## 2025-03-05 ENCOUNTER — PRENATAL OFFICE VISIT (OUTPATIENT)
Dept: OBGYN | Facility: CLINIC | Age: 30
End: 2025-03-05
Attending: REGISTERED NURSE

## 2025-03-05 VITALS
HEART RATE: 101 BPM | DIASTOLIC BLOOD PRESSURE: 75 MMHG | HEIGHT: 66 IN | SYSTOLIC BLOOD PRESSURE: 111 MMHG | BODY MASS INDEX: 36.82 KG/M2

## 2025-03-05 DIAGNOSIS — O09.90 HIGH-RISK PREGNANCY, UNSPECIFIED TRIMESTER: Primary | ICD-10-CM

## 2025-03-05 PROCEDURE — 99213 OFFICE O/P EST LOW 20 MIN: CPT | Performed by: REGISTERED NURSE

## 2025-03-05 PROCEDURE — 87653 STREP B DNA AMP PROBE: CPT | Performed by: REGISTERED NURSE

## 2025-03-05 RX ORDER — SENNA AND DOCUSATE SODIUM 50; 8.6 MG/1; MG/1
1 TABLET, FILM COATED ORAL AT BEDTIME
Qty: 90 TABLET | Refills: 0 | Status: SHIPPED | OUTPATIENT
Start: 2025-03-05

## 2025-03-05 NOTE — PROGRESS NOTES
"Subjective:      30 year old  at 37w0d presents for a routine prenatal appointment.  No vaginal bleeding or leakage of fluid.  Occasional BH contractions or cramping.   Reports regular fetal movement.       No HA, visual changes, RUQ or epigastric pain.   GBS due   Normal growth US : EFW 49%    Patient concerns: none. Feeling well overall.       Objective:  Vitals:    25 1419   BP: 111/75   Pulse: 101   Height: 1.676 m (5' 5.98\")    See OB flowsheet    Assessment/Plan     Patient Active Problem List    Diagnosis Date Noted    Hip pain, bilateral 2025     Priority: Medium    Pain of pelvic girdle 2025     Priority: Medium    High-risk pregnancy, unspecified trimester 2025     Priority: Medium     [facility] Patient Provider Group choice: MD group ?  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  [x]Dating  [x] 1st trimester screening: MFM referral placed for 1st trimester screening place  [x]Fetal anatomy US ordered  [x] No added risk for PRE-E  [x]No need for utox in labor  []COVID vaccine completed  []Pap    12-23wks________________________    [x]Rubella immune  [x]Hep B NONimmune   [x]Varicella immune  []FLU shot    24-28wk_________________________  [x]EOB folder  [x]Labor plans:FOB, his mom  [x]Prenatal Ed  [x]: no  [x]Infant feeding plan--breast  [x][place] care provider choice: probably [facility] clinic  [x]PP Contraception likely nexplanon  [x]TDAP declines      29-35 wk________________________    [x]GCT nml results  [x]RSV-declines    36-37 wks______________________   [] GBS/CBC  []OTC PP meds sent  []PP recovery plans/support:  []Planning CS-ERAS pkt    38-42 wks______________________  []IOL reason/plans  []Postdates BPP        History of shoulder dystocia in prior pregnancy 2024     Priority: Medium     2014: 3.9k   She began pushing at 1945 with good effort. Upon delivery of the fetal head, a shoulder " dystocia was noted. The left shoulder was anterior. Oxana maneuver was performed and the baby delivered in under 30 seconds from the time the dystocia was called. No suprapubic pressure was applied. The infant was noted to be moving both arms spontaneously after delivery.       Major depression 06/11/2015     Priority: Medium    Low back pain 09/27/2014     Priority: Medium    History of cigar smoking 04/29/2014     Priority: Medium    Adjustment disorder with mixed anxiety and depressed mood 03/23/2010     Priority: Medium           6/13/2023     2:58 PM 11/9/2023     7:48 AM 8/28/2024    11:03 AM   PHQ-9 SCORE   PHQ-9 Total Score 4 0 11         6/13/2023     2:58 PM 11/9/2023     7:48 AM 8/28/2024    11:03 AM   PHQ   PHQ-9 Total Score 4 0 11   Q9: Thoughts of better off dead/self-harm past 2 weeks Not at all Not at all Not at all     Orders Placed This Encounter   Procedures    Group B strep PCR     GBS ordered.   -Reviewed methods that might promote labor including cervical membrane sweep, intercourse, orgasm, nipple stimulation, acupuncture, chiropractic care. Discussed that research of each of these individual methods has not shown to significantly increase the likelihood that she will go into labor earlier, but she could try them and they might help promote labor, especially if she combines methods.       - Would plan IOL @ 40 weeks.   Updated individualized prenatal care plan on problem list including offering OTC postpartum meds and PP suppport plans:  - Has Tylenol and ibuprofen at home. RX sent for stool softener.   - Will have 12 weeks of FMLA   Labor signs discussed. Reinforced daily fetal movement counts.  Reviewed why/how to contact provider if headache/visual changes/RUQ or epigastric pain, decreased fetal movement, vaginal bleeding, leakage of fluid.  Return to clinic in 1 week and prn if questions or concerns.     MEENU Glalagher CNM

## 2025-03-06 LAB — GP B STREP DNA SPEC QL NAA+PROBE: NEGATIVE

## 2025-03-18 NOTE — PATIENT INSTRUCTIONS
"Week 39 of Your Pregnancy: Care Instructions     babies can look different than what you see in pictures or movies. Their heads can be a strange shape right after birth. And they may have swollen eyes and red marks on their faces.   You can still get pregnant even if you are breastfeeding. If you don't want to get pregnant, talk to your doctor about birth control.   Tips for week 39 of pregnancy  If you plan to breastfeed, get prepared.       Continue to eat healthy foods.  Keep taking your prenatal vitamins during breastfeeding if your doctor recommends it.  Talk to your doctor before taking any medicines or supplements.  Choose the right birth control for you.       Intrauterine devices (IUDs) are placed in the uterus. Sometimes the IUD can be placed right after giving birth. They work for years.  Hormonal implants are placed under the skin of the arm. They also work for years.  Depo-Provera is a shot. You get it every 3 months.  Birth control pills can be used. They're taken every day.  Tubal ligation (tying your tubes) and vasectomy are surgeries. They're permanent.  Diaphragms, spermicide, and condoms must be used each time you have sex. If you used a diaphragm before, you should get refitted after the baby is born.  A birth control patch or ring can be used. These just can't be started until several weeks after you give birth.  Follow-up care is a key part of your treatment and safety. Be sure to make and go to all appointments, and call your doctor if you are having problems. It's also a good idea to know your test results and keep a list of the medicines you take.  Where can you learn more?  Go to https://www.[address].net/patiented  Enter A811 in the search box to learn more about \"Week 39 of Your Pregnancy: Care Instructions.\"  Current as of: 2024  Content Version: 14.4    6473-8339 [employer].   Care instructions adapted under license by your healthcare professional. If you " "have questions about a medical condition or this instruction, always ask your healthcare professional. [employer] disclaims any warranty or liability for your use of this information.    Weeks 40 to 41 of Your Pregnancy: Care Instructions  By week 40, you've reached your due date. Your baby could be coming any day. Most babies born after their due dates are healthy. But you may have tests to make sure everything is okay. If you feel stressed, you could try a meditation exercise, such as guided imagery. It may help you relax.    If you are past 41 weeks, your doctor may measure the amount of fluid that surrounds your baby. They may also test your baby's movement and heart rate.   If you don't start labor on your own by 41 or 42 weeks, your doctor may want to start (induce) labor. If there are other concerns, your doctor may talk to you about a .   To start (induce) your labor, your doctor may do any of these things.    Place a narrow tube with a small balloon on the end (balloon catheter) into your cervix.  The doctor inflates the balloon. This helps your cervix open (dilate).     Sweep the membranes (separate the lining of the amniotic sac from the uterus).  This helps the uterus make a chemical that can start contractions.     \"Break your water\" (rupture the amniotic sac).  This may be done if your cervix has started to open.     Use medicines.  They may be used to soften the cervix or start contractions.   How to do guided imagery    Close your eyes, and take a few deep breaths. Picture a peaceful setting, such as a beach or a meadow. Add a winding path. Follow the path until you feel more and more relaxed.   Take a few minutes to breathe slowly and feel the calm. When you are ready, slowly take yourself back to the present. Count to 3, and open your eyes.   Follow-up care is a key part of your treatment and safety. Be sure to make and go to all appointments, and call your doctor if you are " "having problems. It's also a good idea to know your test results and keep a list of the medicines you take.  Where can you learn more?  Go to https://www.[employer].net/patiented  Enter T922 in the search box to learn more about \"Weeks 40 to 41 of Your Pregnancy: Care Instructions.\"  Current as of: April 30, 2024  Content Version: 14.4    7583-7600 [employer].   Care instructions adapted under license by your healthcare professional. If you have questions about a medical condition or this instruction, always ask your healthcare professional. [employer] disclaims any warranty or liability for your use of this information.    Labor Induction  What You Need to Know  What is labor induction?  In most cases, it is best to go into labor naturally. When labor does not start on its own, we may use medicine or other methods to start (induce) labor. This is called induction, which:  Helps the uterus contract  Thins, softens and opens the cervix (opening of the uterus)  When is induction used?  There are two types of induction.  Medical induction may be done to protect the health of the parent or baby if:  There are medical concerns for you or your baby.  You haven't had your baby by 41 weeks.  Induction for non-medical reasons may be done at 39 weeks or later if:  You live far from the hospital.  You've been having contractions for many days.  You've given birth quickly in the past.   We will not perform an induction for non-medical reasons before 39 weeks. Studies show that babies born before 39 weeks may struggle with breathing, feeding, sleeping and staying warm. They are more likely to have health problems and may need to stay in the hospital longer. If we start your labor for medical reasons, the benefits will outweigh these risks. We will talk to you about your risks, benefits and alternatives (other options) before we start your labor.  How is induction done?  We may start your labor by doing " "one or more of the following:  We may need to help your cervix soften and open (sometimes called \"ripening\") to prepare it for labor. There are two ways to do this:  Medicines--these may be in the form of pills that you swallow or medicines that are put into the vagina next to the cervix.  Mechanical--using either a single or double balloon. These are small balloons that are attached to tubes that go up inside the cervix. The balloons are then filled with water to put pressure on the cervix and help the cervix soften and open. Depending on the type of balloon used, you may be allowed to go home after it is placed.  After your cervix is ready:  We may give you medicine through an IV (a small tube placed in your vein). This medicine is called Pitocin. It helps the muscles of your uterus to contract.  We may make a small opening in your bag of water (the sac around your baby). This is called an amniotomy. Sometimes called \"breaking the water\". It may help your uterus contract and your cervix open.  What might happen if my labor is induced?  Some of this depends on how your labor is started and how your body responds. Your labor may be more complicated. You and your baby may need more medical treatments. In general:  You may not go into labor right away. If so, we may send you home with follow-up instructions.  It will be important to monitor you and your baby during the induction.  You may not be able to move around during labor. You will have two belts with monitors attached to your belly. These allow the nurse to watch your contractions and your baby's heart rate.  Your labor may take longer than if you went into labor on your own. It might take more than one day.  If you need cervical ripening, it is important to know that it may be many hours (even days) until delivery happens.  Cervical ripening can be slow and require several doses before your body is ready to labor.  A long labor may increase the risk of " infection in mother and baby.  Your labor may not progress as planned. This could lead to a  birth.  Can I plan the date of my delivery?  After 39 weeks, you may ask about planning your delivery date. This is only an option if your body--and your baby--are ready. To find out, we will check your cervix and your baby's heart rate.   If you are ready to be induced, we will give you a date and time to come to the hospital. If many patients are in labor that day, we may need to start your labor at another time.  If you are not ready, we will not start your labor. It will be safer for your baby to come on its own.  What else do I need to know?  Before you have an induction, make sure you understand the reasons, risks and benefits. Ask your doctor or nurse-midwife:  Why do I need to be induced?  What are the risks to my baby?  How will you start my labor?  How will you know if my baby is ready to be born?  How will you know if my body is ready to give birth?  Where can I get more information?  To learn more about induction, you may visit these websites:  The American College of Nurse-Midwives:  www.mymidwife.org  The American College of Obstetricians and Gynecologists: www.acog.org  Childbirth Connection:  www.childbirthconnection.org  March of Dimes: www.marchofdimes.com  Association of Women's Health, Obstetrics, and  Nursing  www.jhoham3imw.org/go-the-full-40&#047;  For informational purposes only. Not to replace the advice of your health care provider. Copyright   2008 [employer]. All rights reserved. Clinically reviewed by the  System Operations Leadership team. [employer] [id number] - REV .

## 2025-03-19 ENCOUNTER — PRENATAL OFFICE VISIT (OUTPATIENT)
Dept: OBGYN | Facility: CLINIC | Age: 30
End: 2025-03-19
Attending: ADVANCED PRACTICE MIDWIFE

## 2025-03-19 VITALS
HEART RATE: 112 BPM | WEIGHT: 235 LBS | SYSTOLIC BLOOD PRESSURE: 97 MMHG | BODY MASS INDEX: 37.95 KG/M2 | DIASTOLIC BLOOD PRESSURE: 66 MMHG

## 2025-03-19 DIAGNOSIS — Z87.59 HISTORY OF SHOULDER DYSTOCIA IN PRIOR PREGNANCY: ICD-10-CM

## 2025-03-19 DIAGNOSIS — O09.90 HIGH-RISK PREGNANCY, UNSPECIFIED TRIMESTER: Primary | ICD-10-CM

## 2025-03-19 PROCEDURE — 99213 OFFICE O/P EST LOW 20 MIN: CPT | Performed by: ADVANCED PRACTICE MIDWIFE

## 2025-03-19 PROCEDURE — 99207 PR PRENATAL VISIT: CPT | Performed by: ADVANCED PRACTICE MIDWIFE

## 2025-03-19 NOTE — PROGRESS NOTES
Subjective:     30 year old  at 39w0d presents for routine prenatal visit.   No vaginal bleeding or leakage of fluid. Intermittent contractions or cramping.   Reports regular fetal movement.       No HA, visual changes, RUQ or epigastric pain.       Patient concerns: none today    Objective:  Vitals:    25 1257   BP: 97/66   Pulse: 112   Weight: 106.6 kg (235 lb)      See OB flowsheet    Assessment/Plan  Patient Active Problem List    Diagnosis Date Noted    High-risk pregnancy, unspecified trimester 2025     Priority: High     [facility] Patient Provider Group choice: MD group ?  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  [x]Dating  [x] 1st trimester screening: MFM referral placed for 1st trimester screening place  [x]Fetal anatomy US ordered  [x] No added risk for PRE-E  [x]No need for utox in labor  []COVID vaccine completed  []Pap    12-23wks________________________    [x]Rubella immune  [x]Hep B NONimmune   [x]Varicella immune  []FLU shot    24-28wk_________________________  [x]EOB folder  [x]Labor plans:FOB, his mom  [x]Prenatal Ed  [x]: no  [x]Infant feeding plan--breast  [x] care provider choice: probably [facility]  [x]PP Contraception likely nexplanon  [x]TDAP declines      29-35 wk________________________    [x]GCT nml results  [x]RSV-declines    36-37 wks______________________   [x] GBS neg  [x]OTC PP meds sent  [x]PP recovery plans/support:Will have 12 weeks of FMLA   []Planning CS-ERAS pkt    38-42 wks______________________  [x]IOL reason/plans: elective, hx of SD, scheduled for 3/27/25  []Postdates BPP        Hip pain, bilateral 2025     Priority: Medium    Pain of pelvic girdle 2025     Priority: Medium    History of shoulder dystocia in prior pregnancy 2024     Priority: Medium     2014: 3.9k   She began pushing at 1945 with good effort. Upon delivery of the fetal head, a shoulder  dystocia was noted. The left shoulder was anterior. Oxana maneuver was performed and the baby delivered in under 30 seconds from the time the dystocia was called. No suprapubic pressure was applied. The infant was noted to be moving both arms spontaneously after delivery.       Major depression 06/11/2015     Priority: Medium    Low back pain 09/27/2014     Priority: Medium    History of cigar smoking 04/29/2014     Priority: Medium    Adjustment disorder with mixed anxiety and depressed mood 03/23/2010     Priority: Medium     No orders of the defined types were placed in this encounter.    [id number] Discussed cervical membrane sweep as an option,  reviewed safety, risks, benefits and possible increased benefits with serial membrane sweep.  Will consider for next visit.  Discussed plans of labor and birth, updated the individualized prenatal care plan on the problem list  - Reviewed why/how to contact provider if headache/visual changes/RUQ or epigastric pain, decreased fetal movement, vaginal bleeding, leakage of fluid or strong/regular contractions  Return to clinic in 1 week and prn if questions or concerns.   I, Nery Lanier, completed the PFSH and ROS. I then acted as a scribe for MEENU Levy CNM, for the remainder of the visit.  Nery Lanier, NP DNP Student    I agree with the PFSH and ROS as completed by the ASHANTI Student, except for changes made by me.  The remainder of the encounter was performed by me and scribed by the ASHANTI Student.  The scribed note accurately reflects my personal services and decisions made by me.  MEENU Levy CNM

## 2025-03-26 ENCOUNTER — PRENATAL OFFICE VISIT (OUTPATIENT)
Dept: OBGYN | Facility: CLINIC | Age: 30
End: 2025-03-26
Attending: ADVANCED PRACTICE MIDWIFE

## 2025-03-26 VITALS
SYSTOLIC BLOOD PRESSURE: 105 MMHG | BODY MASS INDEX: 37.91 KG/M2 | HEIGHT: 66 IN | WEIGHT: 235.9 LBS | HEART RATE: 108 BPM | DIASTOLIC BLOOD PRESSURE: 70 MMHG

## 2025-03-26 DIAGNOSIS — O09.90 HIGH-RISK PREGNANCY, UNSPECIFIED TRIMESTER: Primary | ICD-10-CM

## 2025-03-26 PROCEDURE — 99213 OFFICE O/P EST LOW 20 MIN: CPT | Performed by: ADVANCED PRACTICE MIDWIFE

## 2025-03-26 ASSESSMENT — ANXIETY QUESTIONNAIRES
1. FEELING NERVOUS, ANXIOUS, OR ON EDGE: NOT AT ALL
7. FEELING AFRAID AS IF SOMETHING AWFUL MIGHT HAPPEN: NOT AT ALL
6. BECOMING EASILY ANNOYED OR IRRITABLE: NOT AT ALL
5. BEING SO RESTLESS THAT IT IS HARD TO SIT STILL: NOT AT ALL
IF YOU CHECKED OFF ANY PROBLEMS ON THIS QUESTIONNAIRE, HOW DIFFICULT HAVE THESE PROBLEMS MADE IT FOR YOU TO DO YOUR WORK, TAKE CARE OF THINGS AT HOME, OR GET ALONG WITH OTHER PEOPLE: NOT DIFFICULT AT ALL
3. WORRYING TOO MUCH ABOUT DIFFERENT THINGS: NOT AT ALL
GAD7 TOTAL SCORE: 0
GAD7 TOTAL SCORE: 0
2. NOT BEING ABLE TO STOP OR CONTROL WORRYING: NOT AT ALL

## 2025-03-26 ASSESSMENT — PATIENT HEALTH QUESTIONNAIRE - PHQ9
SUM OF ALL RESPONSES TO PHQ QUESTIONS 1-9: 0
5. POOR APPETITE OR OVEREATING: NOT AT ALL

## 2025-03-27 ENCOUNTER — ANESTHESIA EVENT (OUTPATIENT)
Dept: OBGYN | Facility: CLINIC | Age: 30
End: 2025-03-27
Payer: MEDICAID

## 2025-03-27 ENCOUNTER — ANESTHESIA (OUTPATIENT)
Dept: OBGYN | Facility: CLINIC | Age: 30
End: 2025-03-27
Payer: MEDICAID

## 2025-03-27 ENCOUNTER — HOSPITAL ENCOUNTER (INPATIENT)
Facility: CLINIC | Age: 30
LOS: 2 days | Discharge: HOME HEALTH | End: 2025-03-29
Attending: MIDWIFE | Admitting: ADVANCED PRACTICE MIDWIFE
Payer: MEDICAID

## 2025-03-27 DIAGNOSIS — D64.9 ANEMIA, UNSPECIFIED TYPE: ICD-10-CM

## 2025-03-27 DIAGNOSIS — O09.90 HIGH-RISK PREGNANCY, UNSPECIFIED TRIMESTER: ICD-10-CM

## 2025-03-27 LAB
ABO + RH BLD: NORMAL
APTT PPP: 27 SECONDS (ref 22–38)
BLD GP AB SCN SERPL QL: NEGATIVE
ERYTHROCYTE [DISTWIDTH] IN BLOOD BY AUTOMATED COUNT: 12.8 % (ref 10–15)
ERYTHROCYTE [DISTWIDTH] IN BLOOD BY AUTOMATED COUNT: 13 % (ref 10–15)
FIBRINOGEN PPP-MCNC: 520 MG/DL (ref 170–510)
HCT VFR BLD AUTO: 35.1 % (ref 35–47)
HCT VFR BLD AUTO: 37.8 % (ref 35–47)
HGB BLD-MCNC: 11.7 G/DL (ref 11.7–15.7)
HGB BLD-MCNC: 12.8 G/DL (ref 11.7–15.7)
INR PPP: 0.89 (ref 0.85–1.15)
MCH RBC QN AUTO: 32.7 PG (ref 26.5–33)
MCH RBC QN AUTO: 32.7 PG (ref 26.5–33)
MCHC RBC AUTO-ENTMCNC: 33.3 G/DL (ref 31.5–36.5)
MCHC RBC AUTO-ENTMCNC: 33.9 G/DL (ref 31.5–36.5)
MCV RBC AUTO: 96 FL (ref 78–100)
MCV RBC AUTO: 98 FL (ref 78–100)
PLATELET # BLD AUTO: 269 10E3/UL (ref 150–450)
PLATELET # BLD AUTO: 285 10E3/UL (ref 150–450)
RBC # BLD AUTO: 3.58 10E6/UL (ref 3.8–5.2)
RBC # BLD AUTO: 3.92 10E6/UL (ref 3.8–5.2)
SPECIMEN EXP DATE BLD: NORMAL
T PALLIDUM AB SER QL: NONREACTIVE
WBC # BLD AUTO: 13.3 10E3/UL (ref 4–11)
WBC # BLD AUTO: 8.6 10E3/UL (ref 4–11)

## 2025-03-27 PROCEDURE — 258N000003 HC RX IP 258 OP 636: Performed by: ADVANCED PRACTICE MIDWIFE

## 2025-03-27 PROCEDURE — 85610 PROTHROMBIN TIME: CPT

## 2025-03-27 PROCEDURE — 3E033VJ INTRODUCTION OF OTHER HORMONE INTO PERIPHERAL VEIN, PERCUTANEOUS APPROACH: ICD-10-PCS | Performed by: ADVANCED PRACTICE MIDWIFE

## 2025-03-27 PROCEDURE — 250N000009 HC RX 250: Performed by: ADVANCED PRACTICE MIDWIFE

## 2025-03-27 PROCEDURE — 36415 COLL VENOUS BLD VENIPUNCTURE: CPT

## 2025-03-27 PROCEDURE — 86780 TREPONEMA PALLIDUM: CPT | Performed by: ADVANCED PRACTICE MIDWIFE

## 2025-03-27 PROCEDURE — 250N000013 HC RX MED GY IP 250 OP 250 PS 637: Performed by: ADVANCED PRACTICE MIDWIFE

## 2025-03-27 PROCEDURE — 86850 RBC ANTIBODY SCREEN: CPT | Performed by: ADVANCED PRACTICE MIDWIFE

## 2025-03-27 PROCEDURE — 120N000002 HC R&B MED SURG/OB UMMC

## 2025-03-27 PROCEDURE — 85730 THROMBOPLASTIN TIME PARTIAL: CPT

## 2025-03-27 PROCEDURE — 3E0P7VZ INTRODUCTION OF HORMONE INTO FEMALE REPRODUCTIVE, VIA NATURAL OR ARTIFICIAL OPENING: ICD-10-PCS | Performed by: ADVANCED PRACTICE MIDWIFE

## 2025-03-27 PROCEDURE — 85018 HEMOGLOBIN: CPT

## 2025-03-27 PROCEDURE — 250N000011 HC RX IP 250 OP 636: Performed by: ADVANCED PRACTICE MIDWIFE

## 2025-03-27 PROCEDURE — 370N000003 HC ANESTHESIA WARD SERVICE: Performed by: STUDENT IN AN ORGANIZED HEALTH CARE EDUCATION/TRAINING PROGRAM

## 2025-03-27 PROCEDURE — 85384 FIBRINOGEN ACTIVITY: CPT

## 2025-03-27 PROCEDURE — 86900 BLOOD TYPING SEROLOGIC ABO: CPT | Performed by: ADVANCED PRACTICE MIDWIFE

## 2025-03-27 PROCEDURE — 85014 HEMATOCRIT: CPT | Performed by: ADVANCED PRACTICE MIDWIFE

## 2025-03-27 RX ORDER — METHYLERGONOVINE MALEATE 0.2 MG/ML
200 INJECTION INTRAVENOUS
Status: DISCONTINUED | OUTPATIENT
Start: 2025-03-27 | End: 2025-03-28 | Stop reason: HOSPADM

## 2025-03-27 RX ORDER — OXYTOCIN 10 [USP'U]/ML
10 INJECTION, SOLUTION INTRAMUSCULAR; INTRAVENOUS
Status: DISCONTINUED | OUTPATIENT
Start: 2025-03-27 | End: 2025-03-29 | Stop reason: HOSPADM

## 2025-03-27 RX ORDER — PROCHLORPERAZINE MALEATE 10 MG
10 TABLET ORAL EVERY 6 HOURS PRN
Status: DISCONTINUED | OUTPATIENT
Start: 2025-03-27 | End: 2025-03-28 | Stop reason: HOSPADM

## 2025-03-27 RX ORDER — NALBUPHINE HYDROCHLORIDE 10 MG/ML
2.5-5 INJECTION INTRAMUSCULAR; INTRAVENOUS; SUBCUTANEOUS EVERY 6 HOURS PRN
Status: DISCONTINUED | OUTPATIENT
Start: 2025-03-27 | End: 2025-03-29 | Stop reason: HOSPADM

## 2025-03-27 RX ORDER — TRANEXAMIC ACID 10 MG/ML
1 INJECTION, SOLUTION INTRAVENOUS EVERY 30 MIN PRN
Status: DISCONTINUED | OUTPATIENT
Start: 2025-03-27 | End: 2025-03-28 | Stop reason: HOSPADM

## 2025-03-27 RX ORDER — IBUPROFEN 800 MG/1
800 TABLET, FILM COATED ORAL
Status: DISCONTINUED | OUTPATIENT
Start: 2025-03-27 | End: 2025-03-28 | Stop reason: HOSPADM

## 2025-03-27 RX ORDER — ONDANSETRON 4 MG/1
4 TABLET, ORALLY DISINTEGRATING ORAL EVERY 6 HOURS PRN
Status: DISCONTINUED | OUTPATIENT
Start: 2025-03-27 | End: 2025-03-28 | Stop reason: HOSPADM

## 2025-03-27 RX ORDER — LIDOCAINE 40 MG/G
CREAM TOPICAL
Status: DISCONTINUED | OUTPATIENT
Start: 2025-03-27 | End: 2025-03-29 | Stop reason: HOSPADM

## 2025-03-27 RX ORDER — NALOXONE HYDROCHLORIDE 0.4 MG/ML
0.2 INJECTION, SOLUTION INTRAMUSCULAR; INTRAVENOUS; SUBCUTANEOUS
Status: DISCONTINUED | OUTPATIENT
Start: 2025-03-27 | End: 2025-03-29 | Stop reason: HOSPADM

## 2025-03-27 RX ORDER — MISOPROSTOL 100 UG/1
25 TABLET ORAL EVERY 4 HOURS PRN
Status: DISCONTINUED | OUTPATIENT
Start: 2025-03-27 | End: 2025-03-28 | Stop reason: HOSPADM

## 2025-03-27 RX ORDER — OXYTOCIN/0.9 % SODIUM CHLORIDE 30/500 ML
100-340 PLASTIC BAG, INJECTION (ML) INTRAVENOUS CONTINUOUS PRN
Status: DISCONTINUED | OUTPATIENT
Start: 2025-03-27 | End: 2025-03-29 | Stop reason: HOSPADM

## 2025-03-27 RX ORDER — LIDOCAINE 40 MG/G
CREAM TOPICAL
Status: DISCONTINUED | OUTPATIENT
Start: 2025-03-27 | End: 2025-03-28 | Stop reason: HOSPADM

## 2025-03-27 RX ORDER — OXYTOCIN/0.9 % SODIUM CHLORIDE 30/500 ML
1-24 PLASTIC BAG, INJECTION (ML) INTRAVENOUS CONTINUOUS
Status: DISCONTINUED | OUTPATIENT
Start: 2025-03-27 | End: 2025-03-28 | Stop reason: HOSPADM

## 2025-03-27 RX ORDER — FENTANYL CITRATE-0.9 % NACL/PF 10 MCG/ML
100 PLASTIC BAG, INJECTION (ML) INTRAVENOUS EVERY 5 MIN PRN
Status: DISCONTINUED | OUTPATIENT
Start: 2025-03-27 | End: 2025-03-28 | Stop reason: HOSPADM

## 2025-03-27 RX ORDER — OXYTOCIN/0.9 % SODIUM CHLORIDE 30/500 ML
340 PLASTIC BAG, INJECTION (ML) INTRAVENOUS CONTINUOUS PRN
Status: DISCONTINUED | OUTPATIENT
Start: 2025-03-27 | End: 2025-03-28 | Stop reason: HOSPADM

## 2025-03-27 RX ORDER — CITRIC ACID/SODIUM CITRATE 334-500MG
30 SOLUTION, ORAL ORAL
Status: DISCONTINUED | OUTPATIENT
Start: 2025-03-27 | End: 2025-03-28 | Stop reason: HOSPADM

## 2025-03-27 RX ORDER — MISOPROSTOL 200 UG/1
400 TABLET ORAL
Status: DISCONTINUED | OUTPATIENT
Start: 2025-03-27 | End: 2025-03-28 | Stop reason: HOSPADM

## 2025-03-27 RX ORDER — SODIUM CHLORIDE, SODIUM LACTATE, POTASSIUM CHLORIDE, CALCIUM CHLORIDE 600; 310; 30; 20 MG/100ML; MG/100ML; MG/100ML; MG/100ML
INJECTION, SOLUTION INTRAVENOUS CONTINUOUS PRN
Status: DISCONTINUED | OUTPATIENT
Start: 2025-03-27 | End: 2025-03-28 | Stop reason: HOSPADM

## 2025-03-27 RX ORDER — LOPERAMIDE HYDROCHLORIDE 2 MG/1
2 CAPSULE ORAL
Status: DISCONTINUED | OUTPATIENT
Start: 2025-03-27 | End: 2025-03-28 | Stop reason: HOSPADM

## 2025-03-27 RX ORDER — OXYTOCIN 10 [USP'U]/ML
10 INJECTION, SOLUTION INTRAMUSCULAR; INTRAVENOUS
Status: DISCONTINUED | OUTPATIENT
Start: 2025-03-27 | End: 2025-03-28 | Stop reason: HOSPADM

## 2025-03-27 RX ORDER — FENTANYL/ROPIVACAINE/NS/PF 2MCG/ML-.1
PLASTIC BAG, INJECTION (ML) EPIDURAL
Status: DISCONTINUED | OUTPATIENT
Start: 2025-03-27 | End: 2025-03-28 | Stop reason: HOSPADM

## 2025-03-27 RX ORDER — ONDANSETRON 2 MG/ML
4 INJECTION INTRAMUSCULAR; INTRAVENOUS EVERY 6 HOURS PRN
Status: DISCONTINUED | OUTPATIENT
Start: 2025-03-27 | End: 2025-03-28 | Stop reason: HOSPADM

## 2025-03-27 RX ORDER — KETOROLAC TROMETHAMINE 30 MG/ML
15 INJECTION, SOLUTION INTRAMUSCULAR; INTRAVENOUS
Status: DISCONTINUED | OUTPATIENT
Start: 2025-03-27 | End: 2025-03-28 | Stop reason: HOSPADM

## 2025-03-27 RX ORDER — MISOPROSTOL 200 UG/1
800 TABLET ORAL
Status: DISCONTINUED | OUTPATIENT
Start: 2025-03-27 | End: 2025-03-28 | Stop reason: HOSPADM

## 2025-03-27 RX ORDER — SODIUM CHLORIDE, SODIUM LACTATE, POTASSIUM CHLORIDE, CALCIUM CHLORIDE 600; 310; 30; 20 MG/100ML; MG/100ML; MG/100ML; MG/100ML
INJECTION, SOLUTION INTRAVENOUS CONTINUOUS
Status: DISCONTINUED | OUTPATIENT
Start: 2025-03-27 | End: 2025-03-28 | Stop reason: HOSPADM

## 2025-03-27 RX ORDER — CARBOPROST TROMETHAMINE 250 UG/ML
250 INJECTION, SOLUTION INTRAMUSCULAR
Status: DISCONTINUED | OUTPATIENT
Start: 2025-03-27 | End: 2025-03-28 | Stop reason: HOSPADM

## 2025-03-27 RX ORDER — LOPERAMIDE HYDROCHLORIDE 2 MG/1
4 CAPSULE ORAL
Status: DISCONTINUED | OUTPATIENT
Start: 2025-03-27 | End: 2025-03-28 | Stop reason: HOSPADM

## 2025-03-27 RX ORDER — METOCLOPRAMIDE 10 MG/1
10 TABLET ORAL EVERY 6 HOURS PRN
Status: DISCONTINUED | OUTPATIENT
Start: 2025-03-27 | End: 2025-03-28 | Stop reason: HOSPADM

## 2025-03-27 RX ORDER — FENTANYL CITRATE 50 UG/ML
100 INJECTION, SOLUTION INTRAMUSCULAR; INTRAVENOUS
Status: DISCONTINUED | OUTPATIENT
Start: 2025-03-27 | End: 2025-03-28 | Stop reason: HOSPADM

## 2025-03-27 RX ORDER — METOCLOPRAMIDE HYDROCHLORIDE 5 MG/ML
10 INJECTION INTRAMUSCULAR; INTRAVENOUS EVERY 6 HOURS PRN
Status: DISCONTINUED | OUTPATIENT
Start: 2025-03-27 | End: 2025-03-28 | Stop reason: HOSPADM

## 2025-03-27 RX ORDER — NALOXONE HYDROCHLORIDE 0.4 MG/ML
0.4 INJECTION, SOLUTION INTRAMUSCULAR; INTRAVENOUS; SUBCUTANEOUS
Status: DISCONTINUED | OUTPATIENT
Start: 2025-03-27 | End: 2025-03-29 | Stop reason: HOSPADM

## 2025-03-27 RX ADMIN — SODIUM CHLORIDE, SODIUM LACTATE, POTASSIUM CHLORIDE, AND CALCIUM CHLORIDE: .6; .31; .03; .02 INJECTION, SOLUTION INTRAVENOUS at 16:12

## 2025-03-27 RX ADMIN — MISOPROSTOL 25 MCG: 100 TABLET ORAL at 11:13

## 2025-03-27 RX ADMIN — ONDANSETRON 4 MG: 2 INJECTION INTRAMUSCULAR; INTRAVENOUS at 23:09

## 2025-03-27 RX ADMIN — ONDANSETRON 4 MG: 2 INJECTION INTRAMUSCULAR; INTRAVENOUS at 12:52

## 2025-03-27 RX ADMIN — Medication 2 MILLI-UNITS/MIN: at 16:12

## 2025-03-27 ASSESSMENT — ACTIVITIES OF DAILY LIVING (ADL)
ADLS_ACUITY_SCORE: 41
ADLS_ACUITY_SCORE: 15
ADLS_ACUITY_SCORE: 41
ADLS_ACUITY_SCORE: 15

## 2025-03-27 ASSESSMENT — LIFESTYLE VARIABLES: TOBACCO_USE: 1

## 2025-03-27 NOTE — PROGRESS NOTES
RN called into patients room. Arrived and found patient had just returned to bed area from bathroom. Patient stated she vomited all over the toilet. Patient declined feeling nauseated. Patient declined wanting medication for nausea/vomiting. Patient stated she had to stool and got up and successfully stooled then when she stood and turned to wash hands she suddenly vomited all over the toilet.     After patient finished explaining event, patient immediately began sobbing. Patient was unable to verbally state what was wrong and only stated she was emotional. Patient was supported, encouraged to lean into her emotions and reassured she was safe. Patient recovered and requested to attempt to keep napping at this time.     Patient tucked in with peanut ball. CNROSALEE updated on event.

## 2025-03-27 NOTE — PROGRESS NOTES
Patient is on birth ball with spouse at bedside.     Patient has had relief from contractions post balloon removal. Patient had dinner just delivered and plans on eating and having some family visit.     Patient CTX's have spaced and pitocin is being titrated to tolerance.

## 2025-03-27 NOTE — PROVIDER NOTIFICATION
03/27/25 1510   Provider Notification   Provider Name/Title phyllis groves cnm   Method of Notification Electronic Page   Request Evaluate in Person   Notification Reason SVE     Requested cnm to bedside for sve to check on balloon due to pain with tension per patient preferred SVE to check on balloon

## 2025-03-27 NOTE — H&P
"ADMIT NOTE  =================  40w1d    Supriya Feng is a 30 year old female with an Patient's last menstrual period was 2024 (exact date). and Estimated Date of Delivery: Mar 26, 2025 is [facility] 3[facility]27/2025 at 10:04 AM for a scheduled induction of labor.     HPI  ================  Supriya presents for a scheduled IOL. Indication: elective  Supriya's cervix in clinic yesterday was 1/50%/-3 posterior soft.  Today cervix is unchanged and pt accepts COOK balloon and vaginal misoprostol  Hx is significant for shoulder dystocia: pt reports she just learned that she had a shoulder dystocia with her first delivery two months ago. She was not aware of this at the time. Per the notes it was minor, 30 seconds and resolved with Oxana and baby did not have any birth injury, normal Apgars of 8, 8. 8lbs 10oz  EFW this pregnancy at 35 weeks = EFW 49%/AC 56%. She has had a 43 lb weight gain    Supriya also has a history of anxiety/depression but feels this was more significant with her last birth (in ) when she had fewer resources. She has good support from partner Prashant, and denies any concerns regarding her mood.     Contractions- none  Fetal movement- active  ROM- no   Vaginal bleeding- none  GBS- negative  FOB- is involved, Prashant  Other labor support- Prashant's mother may also come later in her induction process.    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[employer])  [x]NOB folder  [x]Dating  [x] " 1st trimester screening: MFM referral placed for 1st trimester screening place  [x]Fetal anatomy US ordered  [x] No added risk for PRE-E  [x]No need for utox in labor  []COVID vaccine completed  []Pap    12-23wks________________________    [x]Rubella immune  [x]Hep B NONimmune   [x]Varicella immune  []FLU shot    24-28wk_________________________  [x]EOB folder  [x]Labor plans:FOB, his mom  [x]Prenatal Ed  [x]: no  [x]Infant feeding plan--breast  [x][place] care provider choice: probably [facility]  [x]PP Contraception likely nexplanon  [x]TDAP declines      29-35 wk________________________    [x]GCT nml results  [x]RSV-declines    36-37 wks______________________   [x] GBS neg  [x]OTC PP meds sent  [x]PP recovery plans/support:Will have 12 weeks of FMLA   []Planning CS-ERAS pkt    38-42 wks______________________  [x]IOL reason/plans: elective, hx of SD, scheduled for 3/27/25  []Postdates BPP        History of shoulder dystocia in prior pregnancy 2024     Priority: Medium     2014: 3.9k   She began pushing at 1945 with good effort. Upon delivery of the fetal head, a shoulder dystocia was noted. The left shoulder was anterior. Oxana maneuver was performed and the baby delivered in under 30 seconds from the time the dystocia was called. No suprapubic pressure was applied. The infant was noted to be moving both arms spontaneously after delivery.       Major depression 2015     Priority: Medium    Low back pain 2014     Priority: Medium    History of cigar smoking 2014     Priority: Medium    Adjustment disorder with mixed anxiety and depressed mood 2010     Priority: Medium       HISTORIES  ============  No Known Allergies  Past Medical History:   Diagnosis Date    Adjustment disorder with mixed anxiety and depressed mood 2010    Adjustment disorder with mixed emotional features 2010    History of cigar smoking 2014    Low back pain 2014    Major  depression 2015    Nexplanon removal 2023    was inserted in  by report    Personal history of urinary tract infection     recurrent    Substance use disorder     Suicidal behavior 2010    Cutting. Seen in Er @ [facility] for self inflicted laceration.     Past Surgical History:   Procedure Laterality Date    widom teeth     .  Family History   Problem Relation Age of Onset    Substance Abuse Mother     Mental Illness Mother     Diabetes Father     Cancer Father     Diabetes Paternal Grandmother     Cancer Paternal Grandmother     Diabetes Paternal Grandfather     Diabetes Other      Social History     Tobacco Use    Smoking status: Former     Current packs/day: 0.00     Types: Cigarettes     Quit date:      Years since quitting: 10.2    Smokeless tobacco: Never   Substance Use Topics    Alcohol use: Not Currently     Comment: history     OB History    Para Term  AB Living   2 1 1 0 0 1   SAB IAB Ectopic Multiple Live Births   0 0 0 0 1      # Outcome Date GA Lbr Ronald/2nd Weight Sex Type Anes PTL Lv   2 Current            1 Term 14 39w6d 19:00 / 02:34 3.921 kg (8 lb 10.3 oz) M  EPI N CASANDRA      Complications: Shoulder Dystocia      Apgar1: 8  Apgar5: 8      Obstetric Comments   Pt unaware of SD in her history, baby with no birth injury            LABS:   ===========  Prenatal Labs:  Rhogam not indicated   Lab Results   Component Value Date    AS Negative 2024    HEPBANG Nonreactive 2024    TREPAB Negative 2011    HGB 11.6 (L) 2025    HIV Negative 2011     Rubella immune  GBS negative  Other labs:  No results found for this or any previous visit (from the past 24 hours).    ROS  =========  Pt denies significant respiratory, cardiovacular, GI, or muscular/skeletalcomplaints.    See RN data base ROS.       PHYSICAL EXAM:  ===============  /69 (BP Location: Left arm, Patient Position: Semi-Mayen's, Cuff Size: Adult Regular)   Pulse 97   " Temp 97.4  F (36.3  C) (Oral)   Resp 20   Ht 1.676 m (5' 6\")   Wt 106.6 kg (235 lb)   LMP 06/19/2024 (Exact Date)   BMI 37.93 kg/m    General appearance: comfortable  GENERAL APPEARANCE: healthy, alert and no distress  RESP: lungs clear to auscultation - no rales, rhonchi or wheezes  CV: regular rates and rhythm, normal S1 S2, no S3 or S4 and no murmur,and no varicosities  ABDOMEN:  soft, nontender, no epigastric pain  SKIN: no suspicious lesions or rashes  NEURO: Denies headache, blurred vision, other vision changes  PSYCH: mentation appears normal. and affect normal/bright  Legs: Reflexes normal bilaterally     Abdomen: gravid, vertex fetus per Leopold's, non-tender between contractions.   Cephalic presentation confirmed by BSUS JEAN CARLOS  EFW-  7.5lb.   CONTACTIONS: none  FETAL HEART TONES: continuous EFM- baseline 125 with moderate variability and positive accelerations. No decelerations.  PELVIC EXAM: 1/ 50%/ Posterior/ average/ -3   NGUYEN SCORE: 3  BLOODY SHOW: spotting with balloon placement   0940 COOK placed internal balloon 80mL   ROM:no  FLUID: white  ROMPlus: not done    ASSESSMENT:  ==============  IUP @ 40w1d admitted for induction of labor.  Indication: elective   Fetal Heart Rate - category one  GBS- negative  Hx of shoulder dystocia    Patient Active Problem List   Diagnosis    Adjustment disorder with mixed anxiety and depressed mood    Major depression    Low back pain    History of cigar smoking    History of shoulder dystocia in prior pregnancy    High-risk pregnancy, unspecified trimester    Hip pain, bilateral    Pain of pelvic girdle        PLAN:  ===========  Admit - see IP orders.  Pain medication options of nitrous oxide, fentanyl IV and epidural anesthesia reviewed with pt. Pt is interested in avoiding Fentanyl and an epidural. Open to nitrous.  Discussed anesthesia evaluation shortly after admit, for safety and awareness.  Ambulation, hydration, position changes, birthing ball and bath " tub/shower options to facilitate labor reviewed with pt.  Labor induction with vaginal Misoprostol and Cook catheter risks and benefits reviewed with pt. Agreeable to plan.  Anticipate .        Medically Ready for Discharge: Anticipated in 2-4 Days      Nathalie Obando CNM

## 2025-03-27 NOTE — PROGRESS NOTES
Patient labored in tub with minimal relief. Patient moved back to bed and is now in hands and knees with counter pressure to low back. Patient emesis has ceased following zofran. Patient endorses hips being sore and difficulty walking from bathroom to bed.     Patient is breathing through contractions and vocalizing as needed.     Cooks balloon checked and still in place at this time.

## 2025-03-27 NOTE — PROGRESS NOTES
"Car seat education provided to parents as follows:    Inspected car seat and noted to be non . Reviewed with parents proper usage of non , not involved in accident and knowing car seat history. Reviewed sending in card in mail for warranty claims.     Discussed with parents proper positioning of new born. Not using aftermarket products. Chest clip placement at armpit level. How to perform pinch test for belt tightness. Reviewed seatbelts must be at or below shoulder level according to . Reviewed seatbelts to not be twisted in any form or fashion. Review proper attire when placed in car seat including no bulky clothing.     Parents stated they were using a base install with infant car seat. Parents state base is going to be installed via ADULT SEAT BELT Proper usage of adult seat belt was reviewed with parents including not using LATCH and Adult seat belt together. Discussed proper check for 1\" or less movement at belt path. Discussed and demonstrated what this would look like by using infant car seat base and chair as example in room.     Discussed benefits of rear facing for infant safety. Parents noted verbal understanding of this.     All questions were answered during education conversation. Parents were encouraged to refer to their 's recommendation by reading the manual that came with the car seat. Parents also encouraged to seek a car seat clinic in future should they require assistance. Parents reminded of [place] law regarding car seats to use according to 's directions.      Cele Trejo- CPST #[id number]  3/13/2027  "

## 2025-03-27 NOTE — PROVIDER NOTIFICATION
03/27/25 1212   Provider Notification   Provider Name/Title phyllis groves cnm   Method of Notification Electronic Page   Request Evaluate - Remote   Notification Reason Status Update     Notified cnm of vomiting/emotional episode, see note in chart

## 2025-03-27 NOTE — PROGRESS NOTES
"Labor Progress Note     SUBJECTIVE:  ==============  Supriya Feng  Estimated Date of Delivery: Mar 26, 2025  Supriya is resting in bed and reports she feels like something shifted with her balloon and is requesting an SVE   General appearance: uncomfortable with contractions  Support: Partner Prashant      OBJECTIVE:  ==============  VITALS  Blood pressure 113/74, pulse 100, temperature 98  F (36.7  C), temperature source Oral, resp. rate 20, height 1.676 m (5' 6\"), weight 106.6 kg (235 lb), last menstrual period 2024.  Patient Vitals for the past 24 hrs:   BP Temp Temp src Pulse Resp Height Weight   25 1344 113/74 98  F (36.7  C) Oral 100 20 -- --   25 0957 -- -- -- -- -- 1.676 m (5' 6\") 106.6 kg (235 lb)   25 0835 105/69 97.4  F (36.3  C) Oral 97 20 -- --       FETAL HEART RATE ASSESSMENT:  Baseline rate 125, normal  Variability moderate  Accelerations present   Decelerations not present   CONTRACTIONS: Contractions every 3-4 minutes.  Palpate: moderate  Pitocin- none,  Antibiotics- none  ROM: not ruptured    PELVIC EXAM: PELVIC EXAM: 3/ 60/ Posterior/ soft/ -3   Cervical ripening balloon in vagina at 1530. Deflated prior to removal.    # Pain Assessment:      3/27/2025     1:44 PM   Current Pain Score   Patient currently in pain? yes   - Supriya is experiencing pain due to contractions. Pain management was discussed with Supriya and her significant other and the plan was created in a collaborative fashion.  Supriya's response to the current recommendations: engaged  compliant  Patient will rest now that the balloon is out. Amenable to nitrous oxide.    FETAL HEART RATE ASSESSMENT:  Reviewed fetal monitoring strip on unit  EFM interpretation suggests: absence of concern for metabolic acidemia due to: presence of accelerations and moderate variability. EFM suggests no concern for interruption of the oxygen pathway..      Labor course:  3/27/25   0940 1cm/50%/-3/post/mod Jernigan " = 3  COOK 80mL  1113 vaginal miso #1  12pm and 1pm vomited   1pm in tub  1530 Cook /-3/post/soft  1612 pitocin start    ASSESSMENT:  ==============  Supriya WHYTE Ping  30 year old  female  Estimated Date of Delivery: Mar 26, 2025  IUP @ 40w1d in latent/prodromal labor   Elective IOL, COOK out, start pitocin  Fetal Heart Rate Tracing category one over the last 30 minutes  GBS- negative      Patient Active Problem List   Diagnosis    Adjustment disorder with mixed anxiety and depressed mood    Major depression    Low back pain    History of cigar smoking    History of shoulder dystocia in prior pregnancy    High-risk pregnancy, unspecified trimester    Hip pain, bilateral    Pain of pelvic girdle          PLAN:  ===========  -Plan to start pitocin. Patient to try to nap until pitocin start.  -Start nitrous oxide as needed for pain.  - Encouraged frequent position changes to facilitate labor and fetal descent.  - Anticipate progress and NSVB.   - Reevaluate progress in 2-4 hours or sooner with a change in maternal or fetal status.         TERRI ParishN RN Student Nurse Midwife    I, Yasmeen Pritchett, am serving as a scribe; to document services personally performed by  Nathalie Obando CNM based on data collection and the provider's statements to me.   Yasmeen Pritchett  I was present with the student who participated in the service and in the documentation of the note. I have verified the history and personally performed the physical exam and medical decision-making. I agree with the assessment and plan of care as documented in the note.    The student participated in the procedure. I was present for the entire procedure.    MEENU Holliday, IGOR

## 2025-03-27 NOTE — ANESTHESIA PREPROCEDURE EVALUATION
Anesthesia Pre-Procedure Evaluation    Patient: Supriya Feng   MRN: [id number] : 1995        Procedure :           Past Medical History:   Diagnosis Date    Adjustment disorder with mixed anxiety and depressed mood 2010    Adjustment disorder with mixed emotional features 2010    History of cigar smoking 2014    Low back pain 2014    Major depression 2015    Nexplanon removal 2023    was inserted in  by report    Personal history of urinary tract infection     recurrent    Substance use disorder     Suicidal behavior 2010    Cutting. Seen in Er @ [facility] for self inflicted laceration.      Past Surgical History:   Procedure Laterality Date    widom teeth        No Known Allergies   Social History     Tobacco Use    Smoking status: Former     Current packs/day: 0.00     Types: Cigarettes     Quit date:      Years since quitting: 10.2    Smokeless tobacco: Never   Substance Use Topics    Alcohol use: Not Currently     Comment: history      Wt Readings from Last 1 Encounters:   25 106.6 kg (235 lb)        Anesthesia Evaluation   Pt has had prior anesthetic. Type: OB Labor Epidural.    No history of anesthetic complications       ROS/MED HX  ENT/Pulmonary:     (+)                tobacco use (hx of cigar use), Past use,                       Neurologic:  - neg neurologic ROS     Cardiovascular:  - neg cardiovascular ROS     METS/Exercise Tolerance:     Hematologic:  - neg hematologic  ROS     Musculoskeletal: Comment: Low back pain  Bilateral hip pain.      GI/Hepatic:     (+) GERD,                   Renal/Genitourinary:  - neg Renal ROS     Endo:  - neg endo ROS     Psychiatric/Substance Use: Comment: Adjustment disorder    (+) psychiatric history anxiety and depression       Infectious Disease:  - neg infectious disease ROS     Malignancy:       Other:     40w1d  Hx of shoulder dystocia with previous delivery         Physical  "Exam    Airway        Mallampati: III   TM distance: > 3 FB   Neck ROM: full   Mouth opening: > 3 cm    Respiratory Devices and Support         Dental       (+) Modest Abnormalities - crowns, retainers, 1 or 2 missing teeth    B=Bridge, C=Chipped, L=Loose, M=Missing    Cardiovascular   cardiovascular exam normal       Rhythm and rate: regular     Pulmonary   pulmonary exam normal        breath sounds clear to auscultation           OUTSIDE LABS:  CBC:   Lab Results   Component Value Date    WBC 8.6 03/27/2025    WBC 9.0 01/21/2025    HGB 11.7 03/27/2025    HGB 11.6 (L) 01/21/2025    HCT 35.1 03/27/2025    HCT 34.6 (L) 01/21/2025     03/27/2025     01/21/2025     BMP:   Lab Results   Component Value Date     (L) 01/14/2025     11/09/2023    POTASSIUM 4.0 01/14/2025    POTASSIUM 4.6 11/09/2023    CHLORIDE 101 01/14/2025    CHLORIDE 103 11/09/2023    CO2 21 (L) 01/14/2025    CO2 21 (L) 11/09/2023    BUN 6.6 01/14/2025    BUN 15.9 11/09/2023    CR 0.60 01/14/2025    CR 0.60 11/09/2023    GLC 82 01/14/2025    GLC 90 11/09/2023     COAGS: No results found for: \"PTT\", \"INR\", \"FIBR\"  POC:   Lab Results   Component Value Date    HCG Negative 11/09/2023     HEPATIC:   Lab Results   Component Value Date    ALBUMIN 3.6 01/14/2025    PROTTOTAL 6.6 01/14/2025    ALT 13 01/14/2025    AST 18 01/14/2025    ALKPHOS 77 01/14/2025    BILITOTAL 0.2 01/14/2025     OTHER:   Lab Results   Component Value Date    A1C 4.3 09/11/2024    LOLY 8.8 01/14/2025    MAG 1.7 01/14/2025    LIPASE 25 01/14/2025       Anesthesia Plan    ASA Status:  2       Anesthesia Type: Epidural.              Consents    Anesthesia Plan(s) and associated risks, benefits, and realistic alternatives discussed. Questions answered and patient/representative(s) expressed understanding.     - Discussed:     - Discussed with:  Patient            Postoperative Care            Comments:    Other Comments: Unsure about epidural at this time. Patient " okay with signing consent if she changes her mind in the future. Has one broken, but non-loose tooth on tooth #3. Also with mallampati 3 view, but no other specific airway concerns. Glidescope with hyperangulated blade should be adequate if general anesthesia is needed.           Migel Jessica MD    Clinically Significant Risk Factors Present on Admission

## 2025-03-27 NOTE — PROGRESS NOTES
"Labor Progress Note     SUBJECTIVE:  ==============  Supriya Feng  Estimated Date of Delivery: Mar 26, 2025  Called to room by RN who reports pt has vomited twice and is tearful  Supriya had a COOK catheter placed at 9:30am and did not have much cramping. She was able to sit up and eat.   1113 Vaginal misoprostol placed and at 12 and 1pm she had emesis and is now cramping  General appearance: uncomfortable with contractions  Support: Prashant      OBJECTIVE:  ==============  VITALS  Blood pressure 105/69, pulse 97, temperature 97.4  F (36.3  C), temperature source Oral, resp. rate 20, height 1.676 m (5' 6\"), weight 106.6 kg (235 lb), last menstrual period 2024.  Patient Vitals for the past 24 hrs:   BP Temp Temp src Pulse Resp Height Weight   25 0957 -- -- -- -- -- 1.676 m (5' 6\") 106.6 kg (235 lb)   25 0835 105/69 97.4  F (36.3  C) Oral 97 20 -- --       FETAL HEART RATE ASSESSMENT:  Baseline rate 145, normal  Variability moderate  Accelerations present   Decelerations not present       CONTRACTIONS: Contractions every 4-5 minutes.  Palpate: moderate  Pitocin- none,  Antibiotics- none    ROM: not ruptured  PELVIC EXAM: deferred  RN applied gentle traction to COOK catheter and it did not dislodge    # Pain Assessment:      2025     2:46 PM   Current Pain Score   Patient currently in pain? yes   - Supriya is experiencing pain due to contractions. Pain management was discussed and the plan was created in a collaborative fashion.  Supriya's response to the current recommendations: engaged  - desires hydrotherapy      FETAL HEART RATE ASSESSMENT:  Reviewed fetal monitoring strip at bedside  EFM interpretation suggests: absence of concern for metabolic acidemia due to: presence of accelerations and moderate variability. EFM suggests no concern for interruption of the oxygen pathway..    Labor course:    3/27/25   0940 1cm/50%/-3/post/mod Jernigan = 3  COOK 80mL  1113 vaginal miso " #1  12pm and 1pm vomited   1pm: in tub      ASSESSMENT:  ==============  Supriya WHYTE Ping  30 year old  female  Estimated Date of Delivery: Mar 26, 2025  IUP @ 40w1d for induction of labor.  Indication: elective   Fetal Heart Rate Tracing category one over the last 120 minutes  GBS- negative    Patient Active Problem List   Diagnosis    Adjustment disorder with mixed anxiety and depressed mood    Major depression    Low back pain    History of cigar smoking    History of shoulder dystocia in prior pregnancy    High-risk pregnancy, unspecified trimester    Hip pain, bilateral    Pain of pelvic girdle          PLAN:  ===========  - Reviewed recent change in symptoms and discussed that she is likely abruptly going into labor, but could also be an rare allergic reaction to misoprostol. Recommended continuing to monitor symptoms and give Zofran now  -Assisted to toilet and then to tub  -Will continue to check to see if balloon is coming through cervix  - Encouraged frequent position changes to facilitate labor and fetal descent.  - Anticipate progress and NSVB.   - Reevaluate progress in 2-3 hours or sooner with a change in status.                  Nathalie Obando CNM

## 2025-03-27 NOTE — PROGRESS NOTES
"Data: Patient admitted to room 442 at 0755. Patient is a . Prenatal record reviewed.   OB History    Para Term  AB Living   2 1 1 0 0 1   SAB IAB Ectopic Multiple Live Births   0 0 0 0 1      # Outcome Date GA Lbr Ronald/2nd Weight Sex Type Anes PTL Lv   2 Current            1 Term 14 39w6d 19:00 / 02:34 3.921 kg (8 lb 10.3 oz) M  EPI N CASANDRA      Complications: Shoulder Dystocia      Apgar1: 8  Apgar5: 8      Obstetric Comments   Pt unaware of SD in her history, baby with no birth injury       .  Medical History:   Past Medical History:   Diagnosis Date    Adjustment disorder with mixed anxiety and depressed mood 2010    Adjustment disorder with mixed emotional features 2010    History of cigar smoking 2014    Low back pain 2014    Major depression 2015    Nexplanon removal 2023    was inserted in  by report    Personal history of urinary tract infection     recurrent    Substance use disorder     Suicidal behavior 2010    Cutting. Seen in Er @ [facility] for self inflicted laceration.   .  Gestational age 40w1d. Vital signs per doc flowsheet. Fetal movement present. Patient reports Induction Of Labor   as reason for admission. Support persons Sae \"FRANCO\" present.  Action: Care of patient assumed at PATIENT ARRIVAL. Verbal consent for EFM, external fetal monitors applied. Admission assessment completed. Patient and support persons educated on labor process. Patient instructed to report change in fetal movement, contractions, vaginal leaking of fluid or bleeding, abdominal pain, or any concerns related to the pregnancy to her nurse/physician. Patient oriented to room, call light in reach.   Response:  ALONSO PERALTA CNM informed of PATIENT ARRIVAL. Plan per provider is COOK BALLOON W/VAGINAL MISO Q4HR. Patient verbalized understanding of education and verbalized agreement with plan. Patient coping with labor via RESTING, PLANS TO " TAKE NAP IN AM THEN AMBULATE AND BE MORE ACTIVE. WANTS TO UTILIZE NITROUS OXIDE DURING LABOR. PREFERS TO AVOID EPIDURAL. COMPLETELY DECLINES IV FENTANYL.    HX OF SHOULDER DYSTOCIA. DISCUSSED SHOULDER PRECAUTIONS WITH PATIENT AND EXPECTATION, CNM PRESENT AND DISCUSSED TOO. PATIENT QUESTIONS ANSWERED. PLAN FOR 2X RN FOR PATIENT + RESOURCE RN FOR DELIVERY AND NICU PRESENT.

## 2025-03-28 PROCEDURE — 3E0R3BZ INTRODUCTION OF ANESTHETIC AGENT INTO SPINAL CANAL, PERCUTANEOUS APPROACH: ICD-10-PCS | Performed by: STUDENT IN AN ORGANIZED HEALTH CARE EDUCATION/TRAINING PROGRAM

## 2025-03-28 PROCEDURE — 59400 OBSTETRICAL CARE: CPT | Performed by: ADVANCED PRACTICE MIDWIFE

## 2025-03-28 PROCEDURE — 120N000002 HC R&B MED SURG/OB UMMC

## 2025-03-28 PROCEDURE — 250N000011 HC RX IP 250 OP 636: Performed by: STUDENT IN AN ORGANIZED HEALTH CARE EDUCATION/TRAINING PROGRAM

## 2025-03-28 PROCEDURE — 00HU33Z INSERTION OF INFUSION DEVICE INTO SPINAL CANAL, PERCUTANEOUS APPROACH: ICD-10-PCS | Performed by: STUDENT IN AN ORGANIZED HEALTH CARE EDUCATION/TRAINING PROGRAM

## 2025-03-28 PROCEDURE — 10907ZC DRAINAGE OF AMNIOTIC FLUID, THERAPEUTIC FROM PRODUCTS OF CONCEPTION, VIA NATURAL OR ARTIFICIAL OPENING: ICD-10-PCS | Performed by: ADVANCED PRACTICE MIDWIFE

## 2025-03-28 PROCEDURE — 250N000009 HC RX 250: Performed by: ADVANCED PRACTICE MIDWIFE

## 2025-03-28 PROCEDURE — 722N000001 HC LABOR CARE VAGINAL DELIVERY SINGLE

## 2025-03-28 PROCEDURE — 250N000011 HC RX IP 250 OP 636: Performed by: ADVANCED PRACTICE MIDWIFE

## 2025-03-28 PROCEDURE — 258N000003 HC RX IP 258 OP 636: Performed by: ADVANCED PRACTICE MIDWIFE

## 2025-03-28 PROCEDURE — 250N000011 HC RX IP 250 OP 636

## 2025-03-28 PROCEDURE — 250N000013 HC RX MED GY IP 250 OP 250 PS 637: Performed by: MIDWIFE

## 2025-03-28 PROCEDURE — 0KQM0ZZ REPAIR PERINEUM MUSCLE, OPEN APPROACH: ICD-10-PCS | Performed by: ADVANCED PRACTICE MIDWIFE

## 2025-03-28 PROCEDURE — 250N000013 HC RX MED GY IP 250 OP 250 PS 637: Performed by: ADVANCED PRACTICE MIDWIFE

## 2025-03-28 RX ORDER — POLYETHYLENE GLYCOL 3350 17 G/17G
17 POWDER, FOR SOLUTION ORAL DAILY PRN
Status: DISCONTINUED | OUTPATIENT
Start: 2025-03-28 | End: 2025-03-29 | Stop reason: HOSPADM

## 2025-03-28 RX ORDER — METHYLERGONOVINE MALEATE 0.2 MG/ML
200 INJECTION INTRAVENOUS
Status: DISCONTINUED | OUTPATIENT
Start: 2025-03-28 | End: 2025-03-29 | Stop reason: HOSPADM

## 2025-03-28 RX ORDER — CARBOPROST TROMETHAMINE 250 UG/ML
250 INJECTION, SOLUTION INTRAMUSCULAR
Status: DISCONTINUED | OUTPATIENT
Start: 2025-03-28 | End: 2025-03-29 | Stop reason: HOSPADM

## 2025-03-28 RX ORDER — LOPERAMIDE HYDROCHLORIDE 2 MG/1
4 CAPSULE ORAL
Status: DISCONTINUED | OUTPATIENT
Start: 2025-03-28 | End: 2025-03-29 | Stop reason: HOSPADM

## 2025-03-28 RX ORDER — OXYTOCIN/0.9 % SODIUM CHLORIDE 30/500 ML
340 PLASTIC BAG, INJECTION (ML) INTRAVENOUS CONTINUOUS PRN
Status: DISCONTINUED | OUTPATIENT
Start: 2025-03-28 | End: 2025-03-29 | Stop reason: HOSPADM

## 2025-03-28 RX ORDER — AMOXICILLIN 250 MG
2 CAPSULE ORAL
Status: DISCONTINUED | OUTPATIENT
Start: 2025-03-28 | End: 2025-03-29 | Stop reason: HOSPADM

## 2025-03-28 RX ORDER — OXYTOCIN 10 [USP'U]/ML
10 INJECTION, SOLUTION INTRAMUSCULAR; INTRAVENOUS
Status: DISCONTINUED | OUTPATIENT
Start: 2025-03-28 | End: 2025-03-29 | Stop reason: HOSPADM

## 2025-03-28 RX ORDER — KETOROLAC TROMETHAMINE 30 MG/ML
15 INJECTION, SOLUTION INTRAMUSCULAR; INTRAVENOUS ONCE
Status: COMPLETED | OUTPATIENT
Start: 2025-03-28 | End: 2025-03-28

## 2025-03-28 RX ORDER — ACETAMINOPHEN 325 MG/1
650 TABLET ORAL EVERY 4 HOURS PRN
Status: DISCONTINUED | OUTPATIENT
Start: 2025-03-28 | End: 2025-03-29 | Stop reason: HOSPADM

## 2025-03-28 RX ORDER — BISACODYL 10 MG
10 SUPPOSITORY, RECTAL RECTAL DAILY PRN
Status: DISCONTINUED | OUTPATIENT
Start: 2025-03-28 | End: 2025-03-29 | Stop reason: HOSPADM

## 2025-03-28 RX ORDER — IBUPROFEN 800 MG/1
800 TABLET, FILM COATED ORAL EVERY 6 HOURS PRN
Status: DISCONTINUED | OUTPATIENT
Start: 2025-03-28 | End: 2025-03-29 | Stop reason: HOSPADM

## 2025-03-28 RX ORDER — MISOPROSTOL 200 UG/1
400 TABLET ORAL
Status: DISCONTINUED | OUTPATIENT
Start: 2025-03-28 | End: 2025-03-29 | Stop reason: HOSPADM

## 2025-03-28 RX ORDER — OXYCODONE HYDROCHLORIDE 5 MG/1
5 TABLET ORAL EVERY 6 HOURS PRN
Status: DISCONTINUED | OUTPATIENT
Start: 2025-03-28 | End: 2025-03-29 | Stop reason: HOSPADM

## 2025-03-28 RX ORDER — HYDROCORTISONE 25 MG/G
CREAM TOPICAL 3 TIMES DAILY PRN
Status: DISCONTINUED | OUTPATIENT
Start: 2025-03-28 | End: 2025-03-29 | Stop reason: HOSPADM

## 2025-03-28 RX ORDER — TRANEXAMIC ACID 10 MG/ML
1 INJECTION, SOLUTION INTRAVENOUS EVERY 30 MIN PRN
Status: DISCONTINUED | OUTPATIENT
Start: 2025-03-28 | End: 2025-03-29 | Stop reason: HOSPADM

## 2025-03-28 RX ORDER — MISOPROSTOL 200 UG/1
800 TABLET ORAL
Status: DISCONTINUED | OUTPATIENT
Start: 2025-03-28 | End: 2025-03-29 | Stop reason: HOSPADM

## 2025-03-28 RX ORDER — LOPERAMIDE HYDROCHLORIDE 2 MG/1
2 CAPSULE ORAL
Status: DISCONTINUED | OUTPATIENT
Start: 2025-03-28 | End: 2025-03-29 | Stop reason: HOSPADM

## 2025-03-28 RX ADMIN — ACETAMINOPHEN 650 MG: 325 TABLET, FILM COATED ORAL at 16:40

## 2025-03-28 RX ADMIN — ACETAMINOPHEN 650 MG: 325 TABLET, FILM COATED ORAL at 13:09

## 2025-03-28 RX ADMIN — IBUPROFEN 800 MG: 800 TABLET, FILM COATED ORAL at 13:50

## 2025-03-28 RX ADMIN — Medication 8 ML: at 00:37

## 2025-03-28 RX ADMIN — IBUPROFEN 800 MG: 800 TABLET, FILM COATED ORAL at 19:22

## 2025-03-28 RX ADMIN — ACETAMINOPHEN 650 MG: 325 TABLET, FILM COATED ORAL at 21:37

## 2025-03-28 RX ADMIN — OXYCODONE 5 MG: 5 TABLET ORAL at 23:42

## 2025-03-28 RX ADMIN — SODIUM CHLORIDE, SODIUM LACTATE, POTASSIUM CHLORIDE, AND CALCIUM CHLORIDE: .6; .31; .03; .02 INJECTION, SOLUTION INTRAVENOUS at 04:46

## 2025-03-28 RX ADMIN — NALBUPHINE HYDROCHLORIDE 2.5 MG: 10 INJECTION, SOLUTION INTRAMUSCULAR; INTRAVENOUS; SUBCUTANEOUS at 03:22

## 2025-03-28 RX ADMIN — SODIUM CHLORIDE, SODIUM LACTATE, POTASSIUM CHLORIDE, AND CALCIUM CHLORIDE: .6; .31; .03; .02 INJECTION, SOLUTION INTRAVENOUS at 00:00

## 2025-03-28 RX ADMIN — Medication: at 00:37

## 2025-03-28 RX ADMIN — KETOROLAC TROMETHAMINE 15 MG: 30 INJECTION, SOLUTION INTRAMUSCULAR at 07:48

## 2025-03-28 RX ADMIN — LIDOCAINE HYDROCHLORIDE 20 ML: 10 INJECTION, SOLUTION EPIDURAL; INFILTRATION; INTRACAUDAL; PERINEURAL at 06:08

## 2025-03-28 ASSESSMENT — ACTIVITIES OF DAILY LIVING (ADL)
ADLS_ACUITY_SCORE: 17
ADLS_ACUITY_SCORE: 25
ADLS_ACUITY_SCORE: 17
ADLS_ACUITY_SCORE: 25
ADLS_ACUITY_SCORE: 17
ADLS_ACUITY_SCORE: 17
ADLS_ACUITY_SCORE: 25
ADLS_ACUITY_SCORE: 17
ADLS_ACUITY_SCORE: 15
ADLS_ACUITY_SCORE: 17
ADLS_ACUITY_SCORE: 25
ADLS_ACUITY_SCORE: 17
ADLS_ACUITY_SCORE: 17
ADLS_ACUITY_SCORE: 25
ADLS_ACUITY_SCORE: 17
ADLS_ACUITY_SCORE: 25

## 2025-03-28 NOTE — PROGRESS NOTES
"Labor Progress Note     SUBJECTIVE:  ==============  Supriya Feng  Estimated Date of Delivery: Mar 26, 2025    Patient called for RN to come to bedside. [place] like she \"peed herself.\" Possible SROM. Blood noted on chux pad. Thin- unclear if mixed with fluid. Several less than dime sized clots. Pt reporting contractions are more painful, crying, moaning. States worry that she \"can;t do this anymore.\" Has break between contractions.     OBJECTIVE:  ==============  VITALS  Blood pressure 92/53, pulse 100, temperature 97.8  F (36.6  C), temperature source Oral, resp. rate 20, height 1.676 m (5' 6\"), weight 106.6 kg (235 lb), last menstrual period 2024.  Patient Vitals for the past 24 hrs:   BP Temp Temp src Pulse Resp Height Weight   25 1936 92/53 97.8  F (36.6  C) Oral 100 20 -- --   25 1344 113/74 98  F (36.7  C) Oral 100 20 -- --   25 0957 -- -- -- -- -- 1.676 m (5' 6\") 106.6 kg (235 lb)   25 0835 105/69 97.4  F (36.3  C) Oral 97 20 -- --       FETAL HEART RATE ASSESSMENT:  Baseline rate 135, normal  Variability moderate  Accelerations present   Decelerations not present       CONTRACTIONS: every 2-4 minutes.  Palpate: strong  Pitocin- 16 mu/min.,  Antibiotics- none    ROM: unclear SROM  PELVIC EXAM: deferred; Blood noted on chux pad. Thin- unclear if mixed with fluid. Several less than dime sized clots    Not actively bleeding, thin blood tinged fluid    # Pain Assessment:      3/27/2025     7:36 PM   Current Pain Score   Patient currently in pain? yes   - Supriya is experiencing pain due to contractions. Pain management was discussed and the plan was created in a collaborative fashion.  Supriya's response to the current recommendations: engaged  - position changes, breathing, support       FETAL HEART RATE ASSESSMENT:  Reviewed fetal monitoring strip at bedside  EFM interpretation suggests: absence of concern for metabolic acidemia due to: presence of accelerations and " moderate variability. EFM suggests no concern for interruption of the oxygen pathway..      Labor course:  3/27/25   0940 1cm/50%/-3/post/mod Jernigan = 3  COOK 80mL  1113 vaginal miso #1  12pm and 1pm vomited   1pm in tub  1530 Cook /-3/post/soft  1612 pitocin start  - exam stopped d/t pain, int os 4?/40/-3, pit increased to 14mu  [id number]? SROM- blood noted, will get nitrous for exam      ASSESSMENT:  ==============  Supriya Feng  30 year old  female  Estimated Date of Delivery: Mar 26, 2025  IUP @ 40w1d for induction of labor.  Indication: elective   Fetal Heart Rate Tracing category one  GBS- negative    Patient Active Problem List   Diagnosis    Adjustment disorder with mixed anxiety and depressed mood    Major depression    Low back pain    History of cigar smoking    History of shoulder dystocia in prior pregnancy    High-risk pregnancy, unspecified trimester    Hip pain, bilateral    Pain of pelvic girdle          PLAN:  ===========  Continue to monitor bleeding closely.   Will consult with OB MD team if increased.   Cat 1, no tachysystole.   Reevaluate prn per maternal/fetal indications.    MEENU Jaramillo, IGOR

## 2025-03-28 NOTE — L&D DELIVERY NOTE
Delivery Summary    Supriya Feng MRN# [id number]   Age: 30 year old YOB: 1995     Delivery Note    Labor Course: Supriya Feng is a 30 year old  @ 40w2d admitted to  on 3/27/25 for elective induction of labor. C/b hx of shoulder dystocia with last birth, relieved by [place], no injury. SVE on admission at 0940 was 1cm/50%/-3/post/mod, Jernigan score 3. Cook catheter was placed and inflated to 80ml. Received one dose of PV misoprostol at 1113. Used hydrotherapy, position changes and birthing ball. Cook out at 1530, exam 3/60/-3, post/soft. Pitocin was started at 1612. Started feeling much more uncomfortable and requested cervical exam. Difficult exam d/t pain- 4/40/-3. At 2110, bleeding with small clots noted several times, possible fluid. Dr. Powers to bedside to evaluate with passage of 3x4cm clot in toilet. Fetal tracing was category 1, no uterine tachysystole. Coags drawn and wnl. Exam 6cm, very posterior. Pt requested epidural for pain management at 2346.  Received adequate pain relief and was able to rest. Pitocin was turned off during epidural placement d/t discomfort. At 0354, sve 6.5/60/-2, AROM performed with pt consent, return of clear pink tinged fluid.   Pt reported increased pressure and urge to push. C/C/+2.     Delivery Course:  Pt became complete at 0531 and started pushing at 0535. Shoulder dystocia precautions taken.  Delivered a vigorous baby boy at 0556 who was immediately placed on mom's abdomen. Shoulders easily followed delivery of head. No nuchal. IV pitocin started after delivery of infant per protocol. Umbilical cord was double clamped and cut by SNM after the cord stopped pulsing. Cord segment for gases not indicated. Cord blood obtained. Placenta spontaneously delivered intact at 0603 without difficulty via Schultze mechanism. Inspection of vagina and perineum revealed a small 2nd degree laceration and  shallow right labial laceration.  Shallow 2nd degree  laceration was repaired in the usual fashion with 3.0 vicryl. Right labial laceration hemostatic and not requiring repair. 1% lidocaine was infiltrated before the repair.  Fundus is firm and midline.  Mom and baby are stable. RN to [facility] patient.      IUP at 40 weeks gestation delivered on 2025.    , with 2nd degree laceration and hemostatic right labial abrasion, delivery of a viable Male infant.  Weight : 8# 6.9oz  Apgars of 8 at 1 minute and 9 at 5 minutes.  Labor was induced.  Medications administered  in labor:  Pain Rx nubain and Epidural; Antibiotics No.  Perineum: 2nd degree and right labial laceration  Placenta-mechanism: spontaneous, intact,  with a 3 vessel cord. IV oxytocin was given.  QBL was 560mL  Anticipated Discharge Date: 25  Complications of pregnancy, labor and delivery: None  Birth attendants:Rima Henriquez RN,SNM and IGOR Lockett Male-Danielle [[id number]]      Labor Event Times      Latent labor onset date/time: 3/27/2025 0730    Active labor onset date: 3/27/25 Onset time: 10:19 PM   Dilation complete date: 3/28/25 Complete time:  5:31 AM   Start pushing date/time: 3/28/2025 0535          Labor Length      1st Stage (hrs): 7 (min): 12   2nd Stage (hrs): 0 (min): 25   3rd Stage (hrs): 0 (min): 7          Labor Events     labor?: No   steroids: None  Labor Type: Induction/Cervical ripening  Predominate monitoring during 1st stage: continuous electronic fetal monitoring     Antibiotics received during labor?: No       Rupture date/time: 3/28/25 0354   Rupture type: Artificial Rupture of Membranes  Fluid color: Clear, Pink  Fluid odor: Normal     Induction: Misoprostol, Cervical Ripening Balloon  Induction date/time: 3/27/25     Cervical ripening date/time: 3/27/25 0730    Indications for induction: Elective     Augmentation: Oxytocin, AROM  Indications for augmentation: Ineffective Contraction Pattern       Delivery/Placenta Date  "and Time      Delivery Date: 3/28/25 Delivery Time:  5:56 AM   Placenta Date/Time: 3/28/2025  6:03 AM  Oxytocin given at the time of delivery: after delivery of baby  Delivering clinician: Laila Red CNM   Other personnel present at delivery:  Provider Role   Mary Kate Freed RN Delivery Nurse   Sandifer, Suad A, RN Registered Nurse   Rima Henriquez             Vaginal Counts       Initial count performed by 2 team members:  Two Team Members   JAG Kelley RN         Needles Suture Needles Sponges (RETIRED) Instruments   Initial counts 2 0 5    Added to count 0 1 0    Relief counts       Final counts 2 1 5            Placed during labor Accounted for at the end of labor   FSE No NA   IUPC No NA   Cervidil No NA                  Final count performed by 2 team members:  Two Team Members   IGOR Sanches RN      Final count correct?: Yes  Pre-Birth Team Brief: Complete  Post-Birth Team Debrief: Complete       Apgars    Living status: Living   1 Minute 5 Minute 10 Minute 15 Minute 20 Minute   Skin color: 1  1       Heart rate: 2  2       Reflex irritability: 2  2       Muscle tone: 2  2       Respiratory effort: 1  2       Total: 8  9       Apgars assigned by: SUAD MORGAN RN       Cord      Vessels: 3 Vessels    Cord Complications: None               Cord Blood Disposition: Discard    Gases Sent?: No    Delayed cord clamping?: Yes    Cord Clamping Delay (seconds): >120 seconds    Stem cell collection?: No           [place] Resuscitation    Methods: None  [place] Care at Delivery: Data: male baby born at 0556. Delivery unremarkable.  Action: Interventions at birth were drying, bulb suctioning, and warm blankets. Infant placed skin-to-skin with mother.  Response: Stable . Positive bonding behaviors observed.              Measurements      Weight: 8 lb 6 oz Length: 1' 8\"     Head circumference: 35.6 cm           Skin to Skin and Feeding Plan  "     Skin to skin initiation date/time: 1/3/1841    Skin to skin with: Mother  Skin to skin end date/time: 1/3/1841    Breastfeeding initiated date/time: 3/28/2025 0711       Labor Events and Shoulder Dystocia    Fetal Tracing Prior to Delivery: Category 1, Category 2  Fetal Tracing Comments: moderate variability, rare variable decel  Shoulder dystocia present?: Neg       Delivery (Maternal) (Provider to Complete) ([id number])    Episiotomy: None  Perineal lacerations: 2nd Repaired?: Yes     Labial laceration: right Repaired?: Yes   Repair suture: 3-0 Vicryl  Genital tract inspection done: Pos       Blood Loss  Mother: Supriya Feng #[id number]     Start of Mother's Information      Delivery Blood Loss   Intrapartum & Postpartum: 03/27/25 2219 - 03/28/25 0810    Delivery Admission: 03/27/25 0755 - 03/28/25 0810         Intrapartum & Postpartum Delivery Admission    Blood Hospital Encounter 38 mL 38 mL    Delivery QBL (mL) Hospital Encounter 569 mL 569 mL    Total  607 mL 607 mL               End of Mother's Information  Mother: Supriya Feng #[id number]                Delivery - Provider to Complete ([id number])    Delivering clinician: Laila Red CNM  Delivery Type (Choose the 1 that will go to the Birth History): Vaginal, Spontaneous                         Other personnel:  Provider Role   Mary Kate Freed, RN Delivery Nurse   Sandifer, Jaylynn A, RN Registered Nurse   Rima Henriquez                    Placenta    Date/Time: 3/28/2025  6:03 AM  Removal: Spontaneous  Comments: [place]  Disposition: Hospital disposal             Anesthesia    Method: Epidural  Cervical dilation at placement: 4-7                    Presentation and Position    Presentation: Vertex     Occiput Anterior                   Rima GLORIA am serving as a scribe; to document services personally performed by  Laila CORRIGAN CNM based on data collection and the provider's statements to me.     The  encounter was performed by me and scribed by the SNM. The scribed note accurately reflects my personal services and decisions made by me.     MEENU Jaramillo, ЕКАТЕРИНАM

## 2025-03-28 NOTE — PROGRESS NOTES
2108 patient called out for concerns of SROM. RN to bedside noted vaginal bleeding with some small clots. Blood did not appear to be watered down as it would with SROM. Patient cleaned. CNM updated at desk of event.     CAT 1 strip at this time, no decel noted. Pit at 16mu/min.

## 2025-03-28 NOTE — PROGRESS NOTES
"Labor Progress Note     SUBJECTIVE:  ==============  Supriya Feng  Estimated Date of Delivery: Mar 26, 2025    Pt requesting cervical exam. Feels less than she did when the cook catheter was in. Notes some contractions.   Prashant at bedside and supportive.    OBJECTIVE:  ==============  VITALS  Blood pressure 92/53, pulse 100, temperature 97.8  F (36.6  C), temperature source Oral, resp. rate 20, height 1.676 m (5' 6\"), weight 106.6 kg (235 lb), last menstrual period 2024.  Patient Vitals for the past 24 hrs:   BP Temp Temp src Pulse Resp Height Weight   25 1936 92/53 97.8  F (36.6  C) Oral 100 20 -- --   25 1344 113/74 98  F (36.7  C) Oral 100 20 -- --   25 0957 -- -- -- -- -- 1.676 m (5' 6\") 106.6 kg (235 lb)   25 0835 105/69 97.4  F (36.3  C) Oral 97 20 -- --       FETAL HEART RATE ASSESSMENT:  Baseline rate 135, normal  Variability moderate  Accelerations present   Decelerations not present      CONTRACTIONS: every 3-5 minutes.  Palpate: moderate  Pitocin- 14 mu/min.,  Antibiotics- none    ROM: not ruptured  PELVIC EXAM:  very posterior, pt uncomfortable when internal os reached and requested discontinuation of exam, exam immediately stopped     Multiparous post cook catheter cervix- external os 5-6, internal os felt to be 4cm/40/-3    # Pain Assessment:      3/27/2025     7:36 PM   Current Pain Score   Patient currently in pain? yes   - Supriya is experiencing pain due to contractions. Pain management was discussed and the plan was created in a collaborative fashion.  Supriya's response to the current recommendations: engaged  - position change      FETAL HEART RATE ASSESSMENT:  Reviewed fetal monitoring strip at bedside  EFM interpretation suggests: absence of concern for metabolic acidemia due to: presence of accelerations and moderate variability. EFM suggests no concern for interruption of the oxygen pathway..    Labor course:  3/27/25   0940 1cm/50%/-3/post/mod " "Jernigan = 3  COOK 80mL  1113 vaginal miso #1  12pm and 1pm vomited   1pm in tub  1530 Cook OUT 3/60/-3/post/soft  1612 pitocin start  - exam stopped d/t pain, int os 4?/40/-3, pit increased to 14mu      ASSESSMENT:  ==============  Supriya WHYTE Feng  30 year old  female  Estimated Date of Delivery: Mar 26, 2025  IUP @ 40w1d for induction of labor.  Indication: elective   Fetal Heart Rate Tracing category one   GBS- negative    Hx of shoulder dystocia  Patient Active Problem List   Diagnosis    Adjustment disorder with mixed anxiety and depressed mood    Major depression    Low back pain    History of cigar smoking    History of shoulder dystocia in prior pregnancy    High-risk pregnancy, unspecified trimester    Hip pain, bilateral    Pain of pelvic girdle          PLAN:  ===========  Pt uncomfortable with cervical exam.   Pt tearful, expressing frustration about length of time of induction. \"Want baby to come\" and for \"this to be over.\"  Space given for pt to discuss concerns, fears. Support provided. Reassurance given- discussed cervical ripening and induction process, expected length of time, especially before in active labor.  Reviewed AROM could help expedite but not appropriate at this time d/t fetal station.   Reviewed plan to continue pitocin titration.   Encouraged rest when able. Discussed position changes, ambulation to help facilitate labor progress, fetal descent.   Pt verbalized understanding and agreement with plan of care.  Reevaluate prn per maternal/fetal indications.    Laila Red, APRN, CNM      "

## 2025-03-28 NOTE — PROVIDER NOTIFICATION
03/28/25 0350   Provider Notification   Provider Name/Title CAROL Red CNM   Method of Notification At Bedside   Request Evaluate in Person   Notification Reason Labor Status;SVE     SVE per provider was 6.5/60/-2. AROM @0354 was clear & pink. Continue to titrate pitocin as able to. Pt agreeable with plan.

## 2025-03-28 NOTE — PROGRESS NOTES
VSS. Straight cath after delivery was 150 mL. Fundus firm, at U & bleeding is light with a little trickle. Continue observing for more bleeding. Pt breastfeeding well with little assistance. Report given to MIKAEL Perales.

## 2025-03-28 NOTE — PROGRESS NOTES
"Labor Progress Note     SUBJECTIVE:  ==============  Supriya Feng  Estimated Date of Delivery: Mar 26, 2025    Pt sat up for epidural which was placed successfully. Now laying down. Feeling more comfortable. Ready to sleep.     OBJECTIVE:  ==============  VITALS  Blood pressure 92/53, pulse 100, temperature 97.8  F (36.6  C), temperature source Oral, resp. rate 20, height 1.676 m (5' 6\"), weight 106.6 kg (235 lb), last menstrual period 2024.  Patient Vitals for the past 24 hrs:   BP Temp Temp src Pulse Resp Height Weight   25 1936 92/53 97.8  F (36.6  C) Oral 100 20 -- --   25 1344 113/74 98  F (36.7  C) Oral 100 20 -- --   25 0957 -- -- -- -- -- 1.676 m (5' 6\") 106.6 kg (235 lb)   25 0835 105/69 97.4  F (36.3  C) Oral 97 20 -- --       FETAL HEART RATE ASSESSMENT:  Baseline rate 145, normal  Variability moderate  Accelerations present   Decelerations not present       CONTRACTIONS: every 2-4 minutes.  Palpate: strong  Pitocin- turned off for epidural placement d/t pain,  Antibiotics- none    ROM: questionable SROM  PELVIC EXAM: deferred    # Pain Assessment:      3/27/2025     7:36 PM   Current Pain Score   Patient currently in pain? yes   Pain improving with contractions      FETAL HEART RATE ASSESSMENT:  Reviewed fetal monitoring strip at bedside  EFM interpretation suggests: absence of concern for metabolic acidemia due to: presence of accelerations and moderate variability. EFM suggests no concern for interruption of the oxygen pathway..      Labor course:    3/27/25   0940 1cm/50%/-3/post/mod Jernigan = 3  COOK 80mL  1113 vaginal miso #1  12pm and 1pm vomited   1pm in tub  1530 Cook /-3/post/soft  1612 pitocin start  - exam stopped d/t pain, int os 4?/40/-3, pit increased to 14mu  ? SROM- blood noted, will get nitrous for exam  - bleeding with clot 38ml, paolo to bedside, continue labor, stat coags   requesting epidural  3/28  0007- pitocin " off, anesthesia in room  0054 - epidural in place      ASSESSMENT:  ==============  Supriya WHYTE Ping  30 year old  female  Estimated Date of Delivery: Mar 26, 2025  IUP @ 40w2d for induction of labor.  Indication: elective   Fetal Heart Rate Tracing category one  GBS- negative     Hx of shoulder dystocia  Patient Active Problem List   Diagnosis    Adjustment disorder with mixed anxiety and depressed mood    Major depression    Low back pain    History of cigar smoking    History of shoulder dystocia in prior pregnancy    High-risk pregnancy, unspecified trimester    Hip pain, bilateral    Pain of pelvic girdle          PLAN:  ===========  Pitocin turned off to facilitate successful epidural placement.  Now comfortable with epidural and very sleepy- ready for a nap. Thankful for the pain relief.   Will monitor contraction pattern and consider restarting pitocin at 2mu and titrating per protocol.   Encouraged pt to sleep now.   Continue to monitor bleeding- none noted since last check.  Reevaluate prn per maternal/fetal indications. Consider AROM if bow present at next exam.       MEENU Jaramillo, CNM

## 2025-03-28 NOTE — ANESTHESIA PROCEDURE NOTES
Epidural catheter Procedure Note    Pre-Procedure   Staff -        Anesthesiologist:  Griffin Mendoza MD       Resident/Fellow: Armani Cage MD       Performed By: resident, anesthesiologist and with residents       Procedure performed by resident/fellow/CRNA in presence of a teaching physician.         Location: OB       Procedure Start/Stop Times: 3/28/2025 12:15 AM and 3/28/2025 12:40 AM       Pre-Anesthestic Checklist: patient identified, IV checked, risks and benefits discussed, informed consent, monitors and equipment checked, pre-op evaluation, at physician/surgeon's request and post-op pain management  Timeout:       Correct Patient: Yes        Correct Procedure: Yes        Correct Site: Yes        Correct Position: Yes   Procedure Documentation  Procedure: epidural catheter         Patient Position: sitting       Patient Prep/Sterile Barriers: sterile gloves, mask, patient draped       Skin prep: Chloraprep       Local skin infiltrated with 5 mL of 1% lidocaine.        Insertion Site: L2-3. (midline approach).       Technique: LORT saline and LORT air        AVA at 7 cm.       Needle Type: Touhy needle       Needle Gauge: 17.        Needle Length (Inches): 3.5        Catheter: 20 G.          Catheter threaded easily.         5 cm epidural space.         Threaded 12 cm at skin.         # of attempts: 3 and  # of redirects:  2    Assessment/Narrative         Paresthesias: No.       Test dose of 3 mL lidocaine 1.5% w/ 1:200,000 epinephrine at 00:32 CDT.         Test dose negative, 3 minutes after injection, for signs of intravascular, subdural, or intrathecal injection.       Insertion/Infusion Method: LORT saline and LORT air       Aspiration negative for Heme or CSF via Epidural Catheter.       Sensory Level Left: T5.       Sensory Level Right: T4.    Medication(s) Administered   0.125% bupivacaine (Epidural) - EPIDURAL   8 mL - 3/28/2025 12:37:00 AM  Medication Administration Time: 3/28/2025 12:15  "AM      FOR [facility] ONLY:   Pain Team Contact information: please page the Pain Team Via [employer]. Search \"Pain\". During daytime hours, please page the attending first. At night please page the resident first.      "

## 2025-03-28 NOTE — PROVIDER NOTIFICATION
03/27/25 8679   Provider Notification   Provider Name/Title CAROL Red CNM   Method of Notification At Bedside   Request Evaluate in Person   Notification Reason Labor Status;Patient Request     RN notified provider that pt was feeling pressure & the urge to push. SVE per provider was unchanged from previous. Pt requesting epidural & unable to cope with pain. Pitocin was stopped at 0007 while pt was sitting up for epidural. Plan to restart pitocin when ctx's space out. Pt agreeable with plan.

## 2025-03-28 NOTE — LACTATION NOTE
"This note was copied from a baby's chart.  Consult for:  Maternal questions, \"I'm worried I don't have enough milk\"    Infant Name: Marcy    Infant's Primary Care Clinic: still deciding    Delivery Information:  Marcy was born at Gestational Age: 40w2d via vaginal delivery on 3/28/2025 5:56 AM (9 hours old at visit)    Breastfeeding goal (if known): Breastfeed    Maternal Health History:    Information for the patient's mother:  Supriya Feng [[id number]]     Past Medical History:   Diagnosis Date    Adjustment disorder with mixed anxiety and depressed mood 03/23/2010    Adjustment disorder with mixed emotional features 03/23/2010    History of cigar smoking 04/29/2014    Low back pain 09/27/2014    Major depression 06/11/2015    Nexplanon removal 06/13/2023    was inserted in 2015 by report    Personal history of urinary tract infection     recurrent    Substance use disorder     Suicidal behavior 02/11/2010    Cutting. Seen in Er @ [facility] for self inflicted laceration.   ,   Information for the patient's mother:  Supriya Feng [[id number]]     Patient Active Problem List   Diagnosis    Adjustment disorder with mixed anxiety and depressed mood    Major depression    Low back pain    History of cigar smoking    History of shoulder dystocia in prior pregnancy    High-risk pregnancy, unspecified trimester    Hip pain, bilateral    Pain of pelvic girdle   , and   Information for the patient's mother:  Supriya Feng [[id number]]     Medications Prior to Admission   Medication Sig Dispense Refill Last Dose/Taking    acetaminophen (TYLENOL) 325 MG tablet Take 325-650 mg by mouth every 6 hours as needed for mild pain.   More than a month    Prenatal Vit-Fe Fumarate-FA (PRENATAL MULTIVITAMIN  PLUS IRON) 27-1 MG TABS Take by mouth daily.   More than a month    SENNA-docusate sodium (SENNA S) 8.6-50 MG tablet Take 1 tablet by mouth at bedtime. 90 tablet 0 More than a month         Maternal Breast " "Exam:  Supriya noted breast growth and sensitivity in early pregnancy. She denies any history of breast/chest injury or surgery. Her breasts are soft and symmetrical with bilateral intact, everted nipples. She had not yet been able to hand express colostrum; tried with a pump and didn't get anything either. ?    Breastfeeding/ Lactation History: Did not get around to asking; prior child born in .    Infant information: BB was AGA at birth and has age appropriate output and weight loss.      Weight Change Since Birth: 0% at 0 day old     Oral exam of baby:  Babe sleeping at visit.    Feeding History: Only 9 hours old.  Several latches charted.  No voids/ stools yet.    Feeding Assessment:  Mom stated she had tried a few days before delivery to use the pump \"to get labor going and get milk\" but only gtts came out.  She also tried again here and was concerned about lack of milk.  I explained physiology of colostrum, how much easier a baby (and hands) can get it out vs a pump and showed her how to hand express.  She got gtts quickly and easily and applied them to baby's mouth.  I showed them a feeding log and we talked about normal feeding, voiding, stooling patterns in the first 24 hours (vs 2nd and 3rd) and parents were very reassured.  We talked about the concept of the \"birthday nap\" day and mom verbalized wanting to take her \"birthday nap\", did not have any more questions/ concerns for me, so after briefly going over the outpatient resource sheet we ended the conversation.  I let her know she can buzz us with more questions if desired.    Education:   [x] Expected  feeding patterns in the first few days-- discussed verbally  [x] Stages of milk production  [x] Benefits of hand expression of colostrum  [] Early feeding cues     [] Benefits of feeding on cue  [] Benefits of skin to skin  [] Breastfeeding positions  [] Tips to get and maintain a deep latch  [] Nutritive vs.non-nutritive sucking  [] Gentle " breast compressions as needed to enhance milk transfer  [] How to tell when baby is finished  [] How to tell if baby is getting enough  [x] Expected  output  [] [place] weight loss  [x] Infant Feeding Log  [] [facility] Breastfeeding/Pumping videos  [] Signs breastfeeding is going well (comfortable latch, audible swallows, age appropriate output and weight loss)    [] Tips to prevent engorgement  [] Signs of engorgement  [] Tips to manage engorgement  [] Pumping recommendations (based on patient need)  [] [facility] breast pump part/infant feeding supplies cleaning recommendations  [] Inpatient breastfeeding support  [] Outpatient lactation resources    Handouts: Infant Feeding Log (Week 1, Your Guide to Postpartum & [place] Care Book) and [facility] Lactation Resources    Home Breast Pump: MomCozy    Plan: Continue breastfeeding on cue with RN support as needed, goal of 8-12 feedings per day.     Encourage frequent skin to skin and hand expression.     Encouraged follow up with outpatient lactation consultant  as needed after discharge. Family plans to follow up with still deciding.      Emma Parada, RNC-JAIMIE, IBCLC   Lactation Consultant  [place]: [facility] [phone]  Office: [phone]

## 2025-03-28 NOTE — PROGRESS NOTES
"Labor Progress Note     SUBJECTIVE:  ==============  Supriya Feng  Estimated Date of Delivery: Mar 26, 2025    Pt has been sleeping soundly following epidural placement. Agreeable to cervical exam and possible AROM.    OBJECTIVE:  ==============  VITALS  Blood pressure 125/71, pulse 100, temperature 99.5  F (37.5  C), temperature source Oral, resp. rate 18, height 1.676 m (5' 6\"), weight 106.6 kg (235 lb), last menstrual period 2024, SpO2 95%.  Patient Vitals for the past 24 hrs:   BP Temp Temp src Pulse Resp SpO2 Height Weight   25 0715 125/71 -- -- -- -- -- -- --   25 0652 110/68 -- -- -- -- -- -- --   25 0635 126/69 -- -- -- -- -- -- --   25 0619 129/71 -- -- -- -- -- -- --   25 0604 131/60 -- -- -- -- -- -- --   25 0523 -- 99.5  F (37.5  C) Oral -- -- -- -- --   25 0426 -- 98.5  F (36.9  C) Oral -- -- -- -- --   25 0329 107/56 98.7  F (37.1  C) Oral -- -- -- -- --   25 0242 98/52 98.2  F (36.8  C) Oral -- -- 95 % -- --   25 0210 109/56 -- -- -- -- 97 % -- --   25 0140 106/61 -- -- -- -- -- -- --   25 0135 106/62 -- -- -- -- -- -- --   25 0130 103/59 -- -- -- -- -- -- --   25 109/64 -- -- -- -- -- -- --   250 109/68 -- -- -- -- -- -- --   255 108/72 -- -- -- -- -- -- --   250 98/53 -- -- -- -- -- -- --   255 123/70 -- -- -- -- 98 % -- --   250 125/66 -- -- -- -- 97 % -- --   255 110/62 -- -- -- -- 97 % -- --   25 109/60 -- -- -- -- -- -- --   258 124/80 -- -- -- -- -- -- --   256 123/74 -- -- -- -- 98 % -- --   254 124/74 -- -- -- -- -- -- --   25 0042 124/70 -- -- -- -- -- -- --   250 124/72 98.4  F (36.9  C) Oral -- 18 98 % -- --   258 123/79 -- -- -- -- -- -- --   254 127/70 -- -- -- -- -- -- --   25 1936 92/53 97.8  F (36.6  C) Oral 100 20 -- -- --   25 " "1344 113/74 98  F (36.7  C) Oral 100 20 -- -- --   25 0957 -- -- -- -- -- -- 1.676 m (5' 6\") 106.6 kg (235 lb)   25 0835 105/69 97.4  F (36.3  C) Oral 97 20 -- -- --       FETAL HEART RATE ASSESSMENT:  Baseline rate 140, normal  Variability moderate  Accelerations present   Decelerations not present       CONTRACTIONS: every 3-6 minutes.  Palpate: strong  Pitocin- 6 mu/min.,  Antibiotics- none    ROM: AROM performed with pt consent- return of clear fluid, light pink tinge, no active bleeding  PELVIC EXAM:  0352 6.5/60/-2, cervix now anterior      # Pain Assessment:      3/28/2025     3:29 AM   Current Pain Score   Patient currently in pain? el matute pain level was assessed and she currently denies pain.        FETAL HEART RATE ASSESSMENT:  Reviewed fetal monitoring strip at bedside  EFM interpretation suggests: absence of concern for metabolic acidemia due to: presence of accelerations and moderate variability. EFM suggests no concern for interruption of the oxygen pathway..      ASSESSMENT:  ==============  Supriya WHYTE Feng  30 year old  female  Estimated Date of Delivery: Mar 26, 2025  IUP @ 40w2d in active labor   Fetal Heart Rate Tracing category one  GBS- negative    Patient Active Problem List   Diagnosis    Adjustment disorder with mixed anxiety and depressed mood    Major depression    Low back pain    History of cigar smoking    History of shoulder dystocia in prior pregnancy    High-risk pregnancy, unspecified trimester    Hip pain, bilateral    Pain of pelvic girdle          PLAN:  ===========  - AROM performed with pt consent.   - Encouraged frequent position changes to facilitate labor and fetal descent.  - Anticipate progress and NSVB.   - Reevaluate progress in 2 hours or sooner with a change in status.      MEENU Jaramillo, IGOR                "

## 2025-03-28 NOTE — PROGRESS NOTES
"Patient walked the halls from [id number]. Spotty tracing noted due to connectivity and maternal HR tracing intermittently.     Patient back to room and sitting on toilet. Noted decelerations that were gradual on onset but due to difficulty tracing contractions unable to determine what type.     Patient agreeable to return to bed and try some side lying. Patient on right side. Noted increase accelerations and no additional decelerations.     Patient requested SVE to \"know where I am at\". IGOR Ull[place] was messaged at 1931 with an update of patient request for SVE. Plan is to round following safety rounds for SVE. Patient understands this and plans to rest until that time.   "

## 2025-03-28 NOTE — PROGRESS NOTES
"    Labor Progress Note     SUBJECTIVE:  ==============  Supriya Feng  Estimated Date of Delivery: Mar 26, 2025    Called to room for clot in toilet when pt up to void.      OBJECTIVE:  ==============  VITALS  Blood pressure 92/53, pulse 100, temperature 97.8  F (36.6  C), temperature source Oral, resp. rate 20, height 1.676 m (5' 6\"), weight 106.6 kg (235 lb), last menstrual period 2024.  Patient Vitals for the past 24 hrs:   BP Temp Temp src Pulse Resp Height Weight   25 1936 92/53 97.8  F (36.6  C) Oral 100 20 -- --   25 1344 113/74 98  F (36.7  C) Oral 100 20 -- --   25 0957 -- -- -- -- -- 1.676 m (5' 6\") 106.6 kg (235 lb)   25 0835 105/69 97.4  F (36.3  C) Oral 97 20 -- --       FETAL HEART RATE ASSESSMENT:  Baseline rate 125, normal  Variability moderate  Accelerations present   Decelerations not present       CONTRACTIONS: every 2-4 minutes.  Palpate: strong  Pitocin- 16 mu/min.,  Antibiotics- none    ROM: possible srom, clot in toilet, small thin blood on pad  PELVIC EXAM: 6-7/60/-2    Clot and chux wt: 38ml    # Pain Assessment:      3/27/2025     7:36 PM   Current Pain Score   Patient currently in pain? yes   - Supriya is experiencing pain due to contractions. Pain management was discussed and the plan was created in a collaborative fashion.  Supriya's response to the current recommendations: engaged  - unmedicated      FETAL HEART RATE ASSESSMENT:  Reviewed fetal monitoring strip at bedside  EFM interpretation suggests: absence of concern for metabolic acidemia due to: presence of accelerations and moderate variability. EFM suggests no concern for interruption of the oxygen pathway..    Labor course:  3/27/25   0940 1cm/50%/-3/post/mod Jernigan = 3  COOK 80mL  1113 vaginal miso #1  12pm and 1pm vomited   1pm in tub  1530 Cook OUT 3/60/-3/post/soft  1612 pitocin start  - exam stopped d/t pain, int os 4?//-3, pit increased to 14mu  2259-5821? SROM- blood " noted, will get nitrous for exam  - bleeding with clot 38ml, paolo to bedside, continue labor, stat coags      ASSESSMENT:  ==============  Supriya WHYTE Ping  30 year old  female  Estimated Date of Delivery: Mar 26, 2025  IUP @ 40w1d for induction of labor.  Indication: elective   Fetal Heart Rate Tracing category one   GBS- negative    Patient Active Problem List   Diagnosis    Adjustment disorder with mixed anxiety and depressed mood    Major depression    Low back pain    History of cigar smoking    History of shoulder dystocia in prior pregnancy    High-risk pregnancy, unspecified trimester    Hip pain, bilateral    Pain of pelvic girdle          PLAN:  ===========  -Dr. Powers to bedside, evaluated bleeding.   Total 38ml.   - Reviewed fetal tracing, contraction pattern and labor progress.   - Continue close monitoring and goal of vaginal delivery.   - STAT coag labs ordered.    MEENU Jaramillo CNM    Addendum @ 9582:    More pushing pressure during contractions. No pressure between contractions. Requesting cervical exam.   Gloved fingers inserted in vaginal vault, fetal station not lower, cervix remains posterior. Decision made to discontinue exam as not time to push and exam are painful for patient.     Pt requesting epidural. Anesthesia notified. RN preparing patient for placement.     MEENU Jaramillo CNM

## 2025-03-28 NOTE — PROGRESS NOTES
2235 patient called out for concerns of a blood clot in the toilet. Clot noted to be 3x4 inches in toilet. Patient was emotionally distressed and required coaching.     Patient coached to come back to bed to get better fetal monitoring due to telemetry being spotty while on toilet.    IGOR paged to bedside and viewed clot and at bedside for assessment. MD Powers paged for collaborative care due to continued maternal vaginal bleeding. Weighed products since 2108 that noted 38ml of blood by weight.     Patient required coaching to maintain breathing control. Patient voiced she did not want to continue to do this as well as wanting something for the pain. Patient was offered IV fentanyl and/or epidural and patient declined. Patient was asked if she felt better saying these things vs actually wanting pain interventions. Patient did not answer.     Patient labs drawn.     Patient was assisted to left lateral position and encouraged to rest between contractions due to exhaustion. Patient was coached to relax and close her eyes during this time. Vocalization during contractions encouraged.     Verbal bedside report given to Mary Kate Haney RN at 2305. Alerted shoulder precautions due to previous SD with first child 10 years ago. NICU has not been made aware. Two stools in room, warmer checked. Pit at 16.

## 2025-03-29 VITALS
HEIGHT: 66 IN | SYSTOLIC BLOOD PRESSURE: 106 MMHG | BODY MASS INDEX: 36.85 KG/M2 | DIASTOLIC BLOOD PRESSURE: 72 MMHG | OXYGEN SATURATION: 97 % | TEMPERATURE: 97.9 F | WEIGHT: 229.28 LBS | HEART RATE: 85 BPM | RESPIRATION RATE: 16 BRPM

## 2025-03-29 LAB — HGB BLD-MCNC: 9.7 G/DL (ref 11.7–15.7)

## 2025-03-29 PROCEDURE — 85018 HEMOGLOBIN: CPT | Performed by: MIDWIFE

## 2025-03-29 PROCEDURE — 250N000013 HC RX MED GY IP 250 OP 250 PS 637: Performed by: MIDWIFE

## 2025-03-29 PROCEDURE — 36415 COLL VENOUS BLD VENIPUNCTURE: CPT | Performed by: MIDWIFE

## 2025-03-29 RX ORDER — IBUPROFEN 600 MG/1
600 TABLET, FILM COATED ORAL EVERY 6 HOURS PRN
COMMUNITY
Start: 2025-03-29

## 2025-03-29 RX ORDER — FERROUS SULFATE 325(65) MG
325 TABLET ORAL
Qty: 60 TABLET | Refills: 0 | Status: SHIPPED | OUTPATIENT
Start: 2025-03-29

## 2025-03-29 RX ADMIN — IBUPROFEN 800 MG: 800 TABLET, FILM COATED ORAL at 08:16

## 2025-03-29 RX ADMIN — ACETAMINOPHEN 650 MG: 325 TABLET, FILM COATED ORAL at 01:50

## 2025-03-29 RX ADMIN — ACETAMINOPHEN 650 MG: 325 TABLET, FILM COATED ORAL at 05:58

## 2025-03-29 RX ADMIN — PSYLLIUM HUSK 1 PACKET: 3.4 POWDER ORAL at 08:16

## 2025-03-29 RX ADMIN — IBUPROFEN 800 MG: 800 TABLET, FILM COATED ORAL at 01:50

## 2025-03-29 ASSESSMENT — ACTIVITIES OF DAILY LIVING (ADL)
ADLS_ACUITY_SCORE: 17

## 2025-03-29 NOTE — DISCHARGE INSTRUCTIONS
Warning Signs after Having a Baby    Keep this paper on your fridge or somewhere else where you can see it.    Call your provider if you have any of these symptoms up to 12 weeks after having your baby.    Thoughts of hurting yourself or your baby  Pain in your chest or trouble breathing  Severe headache not helped by pain medicine  Eyesight concerns (blurry vision, seeing spots or flashes of light, other changes to eyesight)  Fainting, shaking or other signs of a seizure    Call 9-1-1 if you feel that it is an emergency.     The symptoms below can happen to anyone after giving birth. They can be very serious. Call your provider if you have any of these warning signs.    My provider s phone number: _______________________    Losing too much blood (hemorrhage)    Call your provider if you soak through a pad in less than an hour or pass blood clots bigger than a golf ball. These may be signs that you are bleeding too much.    Blood clots in the legs or lungs    After you give birth, your body naturally clots its blood to help prevent blood loss. Sometimes this increased clotting can happen in other areas of the body, like the legs or lungs. This can block your blood flow and be very dangerous.     Call your provider if you:  Have a red, swollen spot on the back of your leg that is warm or painful when you touch it.   Are coughing up blood.     Infection    Call your provider if you have any of these symptoms:  Fever of 100.4 F (38 C) or higher.  Pain or redness around your stitches if you had an incision.   Any yellow, white, or green fluid coming from places where you had stitches or surgery.    Mood Problems (postpartum depression)    Many people feel sad or have mood changes after having a baby. But for some people, these mood swings are worse.     Call your provider right away if you feel so anxious or nervous that you can't care for yourself or your baby.    Preeclampsia (high blood pressure)    Even if you  "didn't have high blood pressure when you were pregnant, you are at risk for the high blood pressure disease called preeclampsia. This risk can last up to 12 weeks after giving birth.     Call your provider if you have:   Pain on your right side under your rib cage  Sudden swelling in the hands and face    Remember: You know your body. If something doesn't feel right, get medical help.     For informational purposes only. Not to replace the advice of your health care provider. Copyright 2020 [facility]. All rights reserved. Clinically reviewed by Opal Rodriguez, RNC-OB, MSN. [employer] [id number] - Rev .  After Your Delivery (the Postpartum Period): Care Instructions  Overview     After childbirth (postpartum period), your body goes through many changes as you recover. In these weeks after delivery, try to take good care of yourself. Get rest whenever you can and accept help from others.  It may take 4 to 6 weeks to feel like yourself again, and possibly longer if you had a  birth. You may feel sore or very tired as you recover. After delivery, you may continue to have contractions as the uterus returns to the size it was before your pregnancy. You will also have some vaginal bleeding. And you may have pain around the vagina as you heal. Several days after delivery you may also have pain and swelling in your breasts as they fill with milk. There are things you can do at home to help ease these discomforts.  After childbirth, it's common to feel emotional. You may feel irritable, cry easily, and feel happy one minute and sad the next. This is called the \"baby blues.\" Hormone changes are one cause of these emotional changes. These feelings usually get better within a couple of weeks. If they don't, talk to your doctor or midwife.  In the first couple of weeks after you give birth, your doctor or midwife may want to check in with you and make a plan for follow-up care. You will likely have a " complete postpartum visit in the first 3 months after delivery. At that time, your doctor or midwife will check on your recovery and see how you're doing. But if you have questions or concerns before then, you can always call your doctor or midwife.  Follow-up care is a key part of your treatment and safety. Be sure to make and go to all appointments, and call your doctor if you are having problems. It's also a good idea to know your test results and keep a list of the medicines you take.  How can you care for yourself at home?  Taking care of your body  Use pads instead of tampons for bleeding. After birth, you will have bloody vaginal discharge. You may also pass some blood clots that shouldn't be bigger than an egg. Over the next 6 weeks or so, your bleeding should decrease a little every day and slowly change to a pinkish and then whitish discharge.  For cramps or mild pain, try an over-the-counter pain medicine, such as acetaminophen (Tylenol) or ibuprofen (Advil, Motrin). Read and follow all instructions on the label.  To ease pain around the vagina or from hemorrhoids:  Put ice or a cold pack on the area for 10 to 20 minutes at a time. Put a thin cloth between the ice and your skin.  Try sitting in a few inches of warm water (sitz bath) when you can or after bowel movements.  Clean yourself with a gentle squeeze of warm water from a bottle instead of wiping with toilet paper.  Use witch hazel or hemorrhoid pads (such as Tucks).  Try using a cold compress for sore and swollen breasts. And wear a supportive bra that fits.  Ease constipation by drinking plenty of fluids and eating high-fiber foods. Ask your doctor or midwife about over-the-counter stool softeners.  Activity  Rest when you can.  Ask for help from family or friends when you need it.  If you can, have another adult in your home for at least 2 or 3 days after birth.  When you feel ready, try to get some exercise every day. For many people, walking  is a good choice. Don't do any heavy exercise until your doctor or midwife says it's okay.  Ask your doctor or midwife when it is okay to have vaginal sex.  If you don't want to get pregnant, talk to your doctor or midwife about birth control options. You can get pregnant even before your period returns. Also, you can get pregnant while you are breastfeeding.  Talk to your doctor or midwife if you want to get pregnant again. They can talk to you about when it is safe.  Emotional health  It's normal to have some sadness, anxiety, and mood swings after delivery. It may help to talk with a trusted friend or family member. You can also call the Maternal Mental Health Hotline at 8-064-FRU-[facility] (1-684.999.1355) for support. If these mood changes last more than a couple of weeks, talk to your doctor or midwife.  When should you call for help?  Share this information with your partner, family, or a friend. They can help you watch for warning signs.  Call 911  anytime you think you may need emergency care. For example, call if:    You feel you cannot stop from hurting yourself, your baby, or someone else.     You passed out (lost consciousness).     You have chest pain, are short of breath, or cough up blood.     You have a seizure.   Where to get help 24 hours a day, 7 days a week   If you or someone you know talks about suicide, self-harm, a mental health crisis, a substance use crisis, or any other kind of emotional distress, get help right away. You can:    Call the Suicide and Crisis Lifeline at 428.     Call 8-594-911-TALK (1-114.536.6051).     Text HOME to 919933 to access the Crisis Text Line.   Consider saving these numbers in your phone.  Go to [employer] for more information or to chat online.  Call your doctor or midwife now or seek immediate medical care if:    You have signs of hemorrhage (too much bleeding), such as:  Heavy vaginal bleeding. This means that you are soaking through one or more pads in an  "hour. Or you pass blood clots bigger than an egg.  Feeling dizzy or lightheaded, or you feel like you may faint.  Feeling so tired or weak that you cannot do your usual activities.  A fast or irregular heartbeat.  New or worse belly pain.     You have signs of infection, such as:  A fever.  Increased pain, swelling, warmth, or redness from an incision or wound.  Frequent or painful urination or blood in your urine.  Vaginal discharge that smells bad.  New or worse belly pain.     You have symptoms of a blood clot in your leg (called a deep vein thrombosis), such as:  Pain in the calf, back of the knee, thigh, or groin.  Swelling in the leg or groin.  A color change on the leg or groin. The skin may be reddish or purplish, depending on your usual skin color.     You have signs of preeclampsia, such as:  Sudden swelling of your face, hands, or feet.  New vision problems (such as dimness, blurring, or seeing spots).  A severe headache.     You have signs of heart failure, such as:  New or increased shortness of breath.  New or worse swelling in your legs, ankles, or feet.  Sudden weight gain, such as more than 2 to 3 pounds in a day or 5 pounds in a week.  Feeling so tired or weak that you cannot do your usual activities.     You had spinal or epidural pain relief and have:  New or worse back pain.  Increased pain, swelling, warmth, or redness at the injection site.  Tingling, weakness, or numbness in your legs or groin.   Watch closely for changes in your health, and be sure to contact your doctor or midwife if:    Your vaginal bleeding isn't decreasing.     You feel sad, anxious, or hopeless for more than a few days.     You are having problems with your breasts or breastfeeding.   Where can you learn more?  Go to https://www.healthwise.net/patiented  Enter A461 in the search box to learn more about \"After Your Delivery (the Postpartum Period): Care Instructions.\"  Current as of: April 30, 2024  Content Version: " 14.4    8518-1549 [employer].   Care instructions adapted under license by your healthcare professional. If you have questions about a medical condition or this instruction, always ask your healthcare professional. [employer] disclaims any warranty or liability for your use of this information.

## 2025-03-29 NOTE — DISCHARGE SUMMARY
[facility] Discharge Summary    Supriya Feng MRN# [id number]   Age: 30 year old YOB: 1995     Date of Admission:  3/27/2025  Date of Discharge::  3/29/2025  Admitting Provider:  Nathalie Obando CNM  Discharge Provider:  MANN Parish RN SNM; Asuncion CORRIGAN CNM       [facility], [facility]          Admission Diagnoses:   High-risk pregnancy, unspecified trimester [O09.90]          Discharge Diagnosis:     Normal spontaneous vaginal delivery          Procedures:     Procedure(s): Repair of second degree perineal laceration       No procedures performed during this admission           Medications Prior to Admission:     Medications Prior to Admission   Medication Sig Dispense Refill Last Dose/Taking    acetaminophen (TYLENOL) 325 MG tablet Take 325-650 mg by mouth every 6 hours as needed for mild pain.   More than a month    Prenatal Vit-Fe Fumarate-FA (PRENATAL MULTIVITAMIN  PLUS IRON) 27-1 MG TABS Take by mouth daily.   More than a month    SENNA-docusate sodium (SENNA S) 8.6-50 MG tablet Take 1 tablet by mouth at bedtime. 90 tablet 0 More than a month             Discharge Medications:     Current Discharge Medication List        START taking these medications    Details   ibuprofen (ADVIL/MOTRIN) 600 MG tablet Take 1 tablet (600 mg) by mouth every 6 hours as needed for moderate pain. Start after delivery    Associated Diagnoses:  (normal spontaneous vaginal delivery)           CONTINUE these medications which have NOT CHANGED    Details   acetaminophen (TYLENOL) 325 MG tablet Take 325-650 mg by mouth every 6 hours as needed for mild pain.      Prenatal Vit-Fe Fumarate-FA (PRENATAL MULTIVITAMIN  PLUS IRON) 27-1 MG TABS Take by mouth daily.      SENNA-docusate sodium (SENNA S) 8.6-50 MG tablet Take 1 tablet by mouth at bedtime.  Qty: 90 tablet, Refills: 0    Associated Diagnoses: High-risk pregnancy, unspecified trimester     "               Consultations:   No consultations were requested during this admission          Brief History of Labor:   Pt became complete at 0531 and started pushing at 0535. Shoulder dystocia precautions taken.  Delivered a vigorous baby boy at 0556 who was immediately placed on mom's abdomen. Shoulders easily followed delivery of head. No nuchal. IV pitocin started after delivery of infant per protocol. Umbilical cord was double clamped and cut by SNM after the cord stopped pulsing. Cord segment for gases not indicated. Cord blood obtained. Placenta spontaneously delivered intact at 0603 without difficulty via Schultze mechanism. Inspection of vagina and perineum revealed a small 2nd degree laceration and  shallow right labial laceration.  Shallow 2nd degree laceration was repaired in the usual fashion with 3.0 vicryl. Right labial laceration hemostatic and not requiring repair. 1% lidocaine was infiltrated before the repair.  Fundus is firm and midline.  Mom and baby are stable. RN to [facility] patient.      IUP at 40 weeks gestation delivered on 2025.    , with 2nd degree laceration and hemostatic right labial abrasion, delivery of a viable Male infant.  Weight : 8# 6.9oz  Apgars of 8 at 1 minute and 9 at 5 minutes.  Labor was induced.  Medications administered  in labor:  Pain Rx nubain and Epidural; Antibiotics No.  Perineum: 2nd degree and right labial laceration  Placenta-mechanism: spontaneous, intact,  with a 3 vessel cord. IV oxytocin was given.  QBL was 560mL  Anticipated Discharge Date: 25  Complications of pregnancy, labor and delivery: None  Birth attendants:Rima Henriquez RN,SNM and Laila CORRIGAN CNM     Assessment Day of Discharge    Pt stable, baby \"Kairo\" is rooming in  Breast feeding status: initiated and well established  Complications since 2 hours post delivery: None  Patient is tolerating acitivity well Voiding without difficulty, cramping is minimal and is relieved " by Ibuprophen, lochia is decreasing and patient denies clots.  Perineal pain is none.      Postpartum Exam   Breasts: soft, filling  Nipples: erect, no lesions, intact  Abdomen: soft, nontender, fundus firm, umb/-1, midline  Perineum: laceration is well approximated, healing well, approximated, no edema, erythema, bruising, hematoma or s/s of infection  Lochia: min rubra, no clots, no odor  Legs: nontender, trace edema    Patient Vitals for the past 24 hrs:   BP Temp Temp src Pulse Resp SpO2 Weight   03/29/25 0845 -- -- -- -- -- -- 104 kg (229 lb 4.5 oz)   03/29/25 0816 106/72 -- -- -- -- -- --   03/29/25 0811 -- 97.9  F (36.6  C) Oral 85 16 -- --   03/29/25 0555 91/51 97.8  F (36.6  C) Oral 85 16 -- --   03/29/25 0203 111/69 97.7  F (36.5  C) Oral 98 16 -- --   03/28/25 1920 98/76 -- -- -- -- -- --   03/28/25 1918 -- 98  F (36.7  C) Oral 102 16 -- --   03/28/25 1639 106/68 97.9  F (36.6  C) Oral 91 18 -- --   03/28/25 1246 116/68 98  F (36.7  C) Oral 102 18 97 % --                Hospital Course:   The patient's hospital course was unremarkable.  On discharge, her only complaint of back pain was controlled adequately with hot packs. Vaginal bleeding is similar to peak menstrual flow.  Voiding without difficulty.  Ambulating well and tolerating a normal diet.  No fever.  Breastfeeding well.  Infant is stable.  No bowel movement yet.  She was discharged on post-partum day #1.    Post-partum hemoglobin:   Hemoglobin   Date Value Ref Range Status   03/29/2025 9.7 (L) 11.7 - 15.7 g/dL Final   06/13/2012 12.5 11.7 - 15.7 g/dL Final      ASSESSMENT/PLAN:  Patient Active Problem List    Second degree perineal laceration during delivery         Priority: Medium [2]         Date Noted: 03/29/2025      Hip pain, bilateral         Priority: Medium [2]         Date Noted: 01/30/2025      Pain of pelvic girdle         Priority: Medium [2]         Date Noted: 01/30/2025      Postpartum care and examination of lactating mother          Priority: Medium [2]         Date Noted: 2025            [facility] ([facility]) Patient Provider Group            choice: 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[employer])            [x]NOB folder            [x]Dating            [x] 1st trimester screening: MFM referral placed            for 1st trimester             screening place            [x]Fetal anatomy US ordered            [x] No added risk for PRE-E            [x]No need for utox in labor            []COVID vaccine completed            []Pap                        12-23wks________________________                        [x]Rubella immune            [x]Hep B NONimmune             [x]Varicella immune            []FLU shot                        24-28wk_________________________            [x]EOB folder            [x]Labor plans:FOB, his mom            [x]Prenatal Ed            [x]: no            [x]Infant feeding plan--breast            [x][place] care provider choice: probably [facility]            childrens clinic            [x]PP Contraception likely nexplanon            [x]TDAP declines                                    29-35 wk________________________                        [x]GCT nml results            [x]RSV-declines                        36-37 wks______________________             [x] GBS neg            [x]OTC PP meds sent            [x]PP recovery plans/support:Will have 12 weeks of            FMLA             []Planning CS-ERAS pkt                        38-42 wks______________________            [x]IOL reason/plans: elective, hx of SD, scheduled            for 3/27/25            []Postdates BPP                  History of shoulder dystocia in prior pregnancy         Priority: Medium [2]         Date Noted: 2024            2014: 3.9k             She began pushing at 1945 with good effort. Upon            delivery of the fetal head, a shoulder dystocia            was noted. The left  shoulder was anterior.            Oxana maneuver was performed and the baby            delivered in under 30 seconds from the time the            dystocia was called. No suprapubic pressure was            applied. The infant was noted to be moving both            arms spontaneously after             delivery.       Major depression         Priority: Medium [2]         Date Noted: 2015      Low back pain         Priority: Medium [2]         Date Noted: 2014      History of cigar smoking         Priority: Medium [2]         Date Noted: 2014      Adjustment disorder with mixed anxiety and depressed mood         Priority: Medium [2]         Date Noted: 2010        Stable Post-partum day #1  Complications:   - Anemia. Hgb 9.7 postpartum. Rx for Fe sent to pharmacy.  - Vaccination status. Candidate for Tdap, Hep B, and flu vaccinations and pt declines and high risk for postpartum depression    Plan d/c home today  RTC/call 2 weeks for mood check. 6 weeks for postpartum exam.  Teaching done: D/C Instructions: Nutrition/Activity, Engorgement Management, Birth Control Options, Warning Signs/When to Call: Excessive Bleeding, Infection, PP Depression, and RTC Clinic for PP Appointment    Postpartum warning s/s reviewed, including bleeding/clots, fever, mastitis, or depression    Birthcontrol planned: None abstinence until 6 week visit. Will consider options.     Current Discharge Medication List        START taking these medications    Details   ibuprofen (ADVIL/MOTRIN) 600 MG tablet Take 1 tablet (600 mg) by mouth every 6 hours as needed for moderate pain. Start after delivery    Associated Diagnoses:  (normal spontaneous vaginal delivery)           CONTINUE these medications which have NOT CHANGED    Details   acetaminophen (TYLENOL) 325 MG tablet Take 325-650 mg by mouth every 6 hours as needed for mild pain.      Prenatal Vit-Fe Fumarate-FA (PRENATAL MULTIVITAMIN  PLUS IRON) 27-1 MG TABS  Take by mouth daily.      SENNA-docusate sodium (SENNA S) 8.6-50 MG tablet Take 1 tablet by mouth at bedtime.  Qty: 90 tablet, Refills: 0    Associated Diagnoses: High-risk pregnancy, unspecified trimester                  Discharge Instructions and Follow-Up:     Discharge diet: Regular   Discharge activity: Activity as tolerated   Discharge follow-up: In 2 weeks-either RN phone or in person visit if desires, then 6-8 wks CNM   Wound care: Drink plenty of fluids  Ice to area for comfort  Keep wound clean and dry           Discharge Disposition:     Discharged to home   [facility], [facility] for routine postpartum visit in 2 weeks (phone or in person) and 6 weeks    MANN Parish RN Student Nurse Midwife    I, Yasmeen Pritchett, am serving as a scribe; to document services personally performed by  Asuncion Kauffman CNM based on data collection and the provider's statements to me.   Yasmeen Pritchett    The encounter was performed by me and scribed by the SNM. The scribed note accurately reflects my personal services and decisions made by me.     MEENU Montero, IGOR

## 2025-03-29 NOTE — PLAN OF CARE
"  Problem: Adult Inpatient Plan of Care  Goal: Patient-Specific Goal (Individualized)  Description: You can add care plan individualizations to a care plan. Examples of Individualization might be:  \"Parent requests to be called daily at 9am for status\", \"I have a hard time hearing out of my right ear\", or \"Do not touch me to wake me up as it startlesme\".  Outcome: Progressing     Problem: Adult Inpatient Plan of Care  Goal: Optimal Comfort and Wellbeing  Outcome: Progressing  Intervention: Provide Person-Centered Care  Recent Flowsheet Documentation  Taken 3/29/2025 0811 by Alex Conley RN  Trust Relationship/Rapport:   care explained   choices provided   reassurance provided   thoughts/feelings acknowledged   Goal Outcome Evaluation:      Plan of Care Reviewed With: patient    Overall Patient Progress: improvingOverall Patient Progress: improving  Data: VSS and postpartum checks WNL. Patient eating and drinking normally. Patient able to empty bladder independently and up ambulating. Patient performing self care and able to care for infant.Fundus at U and firm  without massage.  lochia scant and  no blood clots. 2+ edema in lower extremities.   Action: Patient taking Ibuprofen and Tylenol for pain with relief. Encouraged patient to breast  feed every 2 - 3 hours and to monitor for cues to feed infant. Patient education done( education record). Abdominal binder on.   Response: Patient participating in infant's care by holding and breast feeding. Positive attachment with infant observed. Support/ FOB present at bedside and attentive to infant and patient.   Plan: Continue with the plan of cares.              "
Goal Outcome Evaluation:      Plan of Care Reviewed With: patient    Overall Patient Progress: improvingOverall Patient Progress: improving     Data: Vital signs stable, assessments within normal limits.   Tolerating regular diet. Voiding, passing gas.   Fundus firm, lochia controlled.   Discharge outcomes on care plan met.   No apparent pain.  Action: Review of care plan, teaching, and discharge instructions done. Hs breat pump at home.  Response: patient states understanding of discharge instructions.  Will  medication from pharmacy before they leave buliding. . Home care consult placed. All questions about mom and baby care addressed. Patient discharged at 1010.      
Goal Outcome Evaluation:      Plan of Care Reviewed With: patient    Overall Patient Progress: improvingOverall Patient Progress: improving     Data: Vital signs, postpartum assessments WNL. Pt is voiding without difficulty, up ambulating ad vaishali, eating and drinking without nausea. Lochia and fundal checks WNL, no s/s infection. Breastfeeding infant. Reports 7/10 pain, relieved with Tylenol, Ibuprofen and Oxycodone.  Action: Education provided on discharge goals, plan of care, pain management  Response: Pt is agreeable with plan of care. Support person, Prashant, present. Plan of care ongoing, no concerns as of present.       Problem: Adult Inpatient Plan of Care  Goal: Optimal Comfort and Wellbeing  Outcome: Progressing  Intervention: Provide Person-Centered Care  Recent Flowsheet Documentation  Taken 3/29/2025 0020 by Latoya Lanier, RN  Trust Relationship/Rapport:   care explained   choices provided   reassurance provided   thoughts/feelings acknowledged     
Goal Outcome Evaluation:      Plan of Care Reviewed With: patient    Overall Patient Progress: improvingOverall Patient Progress: improving    Outcome Evaluation: progressing well    Data: Vital signs within normal limits. Postpartum checks within normal limits - see flow record. Patient eating and drinking normally. Patient able to empty bladder independently and is up ambulating. No apparent signs of infection.  Perineum  healing well. Patient performing self cares and is able to care for infant.  Action: Patient medicated during the shift for pain and cramping and heat applied to back and abdomen. See MAR. Patient reassessed within 1 hour after each medication and pain was improved - patient stated she still has pain but pain medications are helping to reduce. Patient education done about breastfeeding. See flow record.  Response: Positive attachment behaviors observed with infant. Support persons Prashant present.   Plan: Anticipate discharge on 3/29.    To Do:  []Carmine:  []Birth certificate/ROP:  [x]Pump:  []Videos:  
Goal Outcome Evaluation: VSS. Postpartum checks WDL. Up independently, voiding without difficulty. Showered. Pain managed with tylenol and ibuprofen. Breastfeeding with assist, baby sleepy. Demonstrated hand-expression. Pumped x1 per request.                         
Vaginal Delivery Note   of viable Male with CAROL Red CNM in attendance. Nursery RN Carlos present.  Infant with spontaneous cry, to mother's abdomen, dried and stimulated.  APGAR at 1 minute:  8 and APGAR at 5 minutes:  9.  Placenta delivered with out complication, 2nd degree laceration, w/ repair, sara cares provided.  Mother and baby in stable condition.  
No Vaccines Administered.

## 2025-03-29 NOTE — PROVIDER NOTIFICATION
03/28/25 4439   Provider Notification   Provider Name/Title Dr Lomas   Method of Notification Electronic Page   Request Evaluate-Remote   Notification Reason Medication Request     Patient asking for something else for pain as the Tylenol and Ibuprofen are not relieving pain. MD ordered 5mg of Oxycodone q 6 hrs.

## 2025-04-01 ENCOUNTER — TELEPHONE (OUTPATIENT)
Dept: OBGYN | Facility: CLINIC | Age: 30
End: 2025-04-01
Payer: MEDICAID

## 2025-04-01 ENCOUNTER — HOSPITAL ENCOUNTER (EMERGENCY)
Facility: CLINIC | Age: 30
Discharge: HOME | End: 2025-04-02
Attending: EMERGENCY MEDICINE
Payer: MEDICAID

## 2025-04-01 ENCOUNTER — APPOINTMENT (OUTPATIENT)
Dept: ULTRASOUND IMAGING | Facility: CLINIC | Age: 30
End: 2025-04-01
Attending: EMERGENCY MEDICINE
Payer: MEDICAID

## 2025-04-01 ENCOUNTER — APPOINTMENT (OUTPATIENT)
Dept: ULTRASOUND IMAGING | Facility: CLINIC | Age: 30
End: 2025-04-01
Attending: EMERGENCY MEDICINE

## 2025-04-01 ENCOUNTER — NURSE TRIAGE (OUTPATIENT)
Dept: NURSING | Facility: CLINIC | Age: 30
End: 2025-04-01
Payer: MEDICAID

## 2025-04-01 DIAGNOSIS — M54.9 BACK PAIN, UNSPECIFIED BACK LOCATION, UNSPECIFIED BACK PAIN LATERALITY, UNSPECIFIED CHRONICITY: ICD-10-CM

## 2025-04-01 DIAGNOSIS — R10.9 ABDOMINAL PAIN, UNSPECIFIED ABDOMINAL LOCATION: ICD-10-CM

## 2025-04-01 DIAGNOSIS — M79.662 PAIN OF LEFT LOWER LEG: ICD-10-CM

## 2025-04-01 LAB
ALBUMIN SERPL BCG-MCNC: 3.3 G/DL (ref 3.5–5.2)
ALP SERPL-CCNC: 101 U/L (ref 40–150)
ALT SERPL W P-5'-P-CCNC: 20 U/L (ref 0–50)
ANION GAP SERPL CALCULATED.3IONS-SCNC: 12 MMOL/L (ref 7–15)
AST SERPL W P-5'-P-CCNC: 19 U/L (ref 0–45)
BASOPHILS # BLD AUTO: 0 10E3/UL (ref 0–0.2)
BASOPHILS NFR BLD AUTO: 0 %
BILIRUB SERPL-MCNC: 0.2 MG/DL
BUN SERPL-MCNC: 13.8 MG/DL (ref 6–20)
CALCIUM SERPL-MCNC: 9 MG/DL (ref 8.8–10.4)
CHLORIDE SERPL-SCNC: 106 MMOL/L (ref 98–107)
CREAT SERPL-MCNC: 0.8 MG/DL (ref 0.51–0.95)
EGFRCR SERPLBLD CKD-EPI 2021: >90 ML/MIN/1.73M2
EOSINOPHIL # BLD AUTO: 0.2 10E3/UL (ref 0–0.7)
EOSINOPHIL NFR BLD AUTO: 2 %
ERYTHROCYTE [DISTWIDTH] IN BLOOD BY AUTOMATED COUNT: 12.5 % (ref 10–15)
GLUCOSE SERPL-MCNC: 97 MG/DL (ref 70–99)
HCO3 SERPL-SCNC: 23 MMOL/L (ref 22–29)
HCT VFR BLD AUTO: 30.9 % (ref 35–47)
HGB BLD-MCNC: 10.2 G/DL (ref 11.7–15.7)
IMM GRANULOCYTES # BLD: 0 10E3/UL
IMM GRANULOCYTES NFR BLD: 1 %
LIPASE SERPL-CCNC: 28 U/L (ref 13–60)
LYMPHOCYTES # BLD AUTO: 2.1 10E3/UL (ref 0.8–5.3)
LYMPHOCYTES NFR BLD AUTO: 24 %
MCH RBC QN AUTO: 32 PG (ref 26.5–33)
MCHC RBC AUTO-ENTMCNC: 33 G/DL (ref 31.5–36.5)
MCV RBC AUTO: 97 FL (ref 78–100)
MONOCYTES # BLD AUTO: 0.6 10E3/UL (ref 0–1.3)
MONOCYTES NFR BLD AUTO: 7 %
NEUTROPHILS # BLD AUTO: 5.6 10E3/UL (ref 1.6–8.3)
NEUTROPHILS NFR BLD AUTO: 66 %
NRBC # BLD AUTO: 0 10E3/UL
NRBC BLD AUTO-RTO: 0 /100
PLATELET # BLD AUTO: 280 10E3/UL (ref 150–450)
POTASSIUM SERPL-SCNC: 3.9 MMOL/L (ref 3.4–5.3)
PROT SERPL-MCNC: 6.6 G/DL (ref 6.4–8.3)
RBC # BLD AUTO: 3.19 10E6/UL (ref 3.8–5.2)
SODIUM SERPL-SCNC: 141 MMOL/L (ref 135–145)
WBC # BLD AUTO: 8.6 10E3/UL (ref 4–11)

## 2025-04-01 PROCEDURE — 99285 EMERGENCY DEPT VISIT HI MDM: CPT | Mod: 25 | Performed by: EMERGENCY MEDICINE

## 2025-04-01 PROCEDURE — 76856 US EXAM PELVIC COMPLETE: CPT

## 2025-04-01 PROCEDURE — 83690 ASSAY OF LIPASE: CPT | Performed by: EMERGENCY MEDICINE

## 2025-04-01 PROCEDURE — 93971 EXTREMITY STUDY: CPT | Mod: LT

## 2025-04-01 PROCEDURE — 85025 COMPLETE CBC W/AUTO DIFF WBC: CPT | Performed by: EMERGENCY MEDICINE

## 2025-04-01 PROCEDURE — 99285 EMERGENCY DEPT VISIT HI MDM: CPT | Performed by: EMERGENCY MEDICINE

## 2025-04-01 PROCEDURE — 80053 COMPREHEN METABOLIC PANEL: CPT | Performed by: EMERGENCY MEDICINE

## 2025-04-01 PROCEDURE — 96374 THER/PROPH/DIAG INJ IV PUSH: CPT | Performed by: EMERGENCY MEDICINE

## 2025-04-01 PROCEDURE — 36415 COLL VENOUS BLD VENIPUNCTURE: CPT | Performed by: EMERGENCY MEDICINE

## 2025-04-01 PROCEDURE — 250N000011 HC RX IP 250 OP 636: Mod: JZ | Performed by: EMERGENCY MEDICINE

## 2025-04-01 RX ORDER — KETOROLAC TROMETHAMINE 15 MG/ML
15 INJECTION, SOLUTION INTRAMUSCULAR; INTRAVENOUS ONCE
Status: COMPLETED | OUTPATIENT
Start: 2025-04-01 | End: 2025-04-01

## 2025-04-01 RX ADMIN — KETOROLAC TROMETHAMINE 15 MG: 15 INJECTION, SOLUTION INTRAMUSCULAR; INTRAVENOUS at 23:10

## 2025-04-01 ASSESSMENT — ACTIVITIES OF DAILY LIVING (ADL)
ADLS_ACUITY_SCORE: 42
ADLS_ACUITY_SCORE: 42

## 2025-04-01 ASSESSMENT — COLUMBIA-SUICIDE SEVERITY RATING SCALE - C-SSRS
1. IN THE PAST MONTH, HAVE YOU WISHED YOU WERE DEAD OR WISHED YOU COULD GO TO SLEEP AND NOT WAKE UP?: NO
6. HAVE YOU EVER DONE ANYTHING, STARTED TO DO ANYTHING, OR PREPARED TO DO ANYTHING TO END YOUR LIFE?: NO
2. HAVE YOU ACTUALLY HAD ANY THOUGHTS OF KILLING YOURSELF IN THE PAST MONTH?: NO

## 2025-04-02 VITALS
SYSTOLIC BLOOD PRESSURE: 112 MMHG | BODY MASS INDEX: 36.8 KG/M2 | DIASTOLIC BLOOD PRESSURE: 82 MMHG | WEIGHT: 228 LBS | HEART RATE: 84 BPM | OXYGEN SATURATION: 97 % | RESPIRATION RATE: 16 BRPM | TEMPERATURE: 98.1 F

## 2025-04-02 LAB
ALBUMIN UR-MCNC: NEGATIVE MG/DL
APPEARANCE UR: CLEAR
BILIRUB UR QL STRIP: NEGATIVE
COLOR UR AUTO: ABNORMAL
GLUCOSE UR STRIP-MCNC: NEGATIVE MG/DL
HGB UR QL STRIP: ABNORMAL
HOLD SPECIMEN: NORMAL
HOLD SPECIMEN: NORMAL
KETONES UR STRIP-MCNC: NEGATIVE MG/DL
LEUKOCYTE ESTERASE UR QL STRIP: ABNORMAL
MUCOUS THREADS #/AREA URNS LPF: PRESENT /LPF
NITRATE UR QL: NEGATIVE
PH UR STRIP: 6.5 [PH] (ref 5–7)
RBC URINE: 79 /HPF
SP GR UR STRIP: 1.01 (ref 1–1.03)
SQUAMOUS EPITHELIAL: 1 /HPF
UROBILINOGEN UR STRIP-MCNC: NORMAL MG/DL
WBC URINE: 29 /HPF

## 2025-04-02 PROCEDURE — 250N000013 HC RX MED GY IP 250 OP 250 PS 637: Performed by: EMERGENCY MEDICINE

## 2025-04-02 PROCEDURE — 99244 OFF/OP CNSLTJ NEW/EST MOD 40: CPT | Mod: 24 | Performed by: OBSTETRICS & GYNECOLOGY

## 2025-04-02 PROCEDURE — 87086 URINE CULTURE/COLONY COUNT: CPT | Performed by: EMERGENCY MEDICINE

## 2025-04-02 PROCEDURE — 81001 URINALYSIS AUTO W/SCOPE: CPT | Performed by: EMERGENCY MEDICINE

## 2025-04-02 RX ORDER — METHYLERGONOVINE MALEATE 0.2 MG/1
0.2 TABLET ORAL 3 TIMES DAILY
Qty: 9 TABLET | Refills: 0 | Status: SHIPPED | OUTPATIENT
Start: 2025-04-02 | End: 2025-04-05

## 2025-04-02 RX ORDER — METHYLERGONOVINE MALEATE 0.2 MG/1
200 TABLET ORAL ONCE
Status: COMPLETED | OUTPATIENT
Start: 2025-04-02 | End: 2025-04-02

## 2025-04-02 RX ADMIN — METHYLERGONOVINE 200 MCG: 0.2 TABLET ORAL at 01:21

## 2025-04-02 ASSESSMENT — ACTIVITIES OF DAILY LIVING (ADL): ADLS_ACUITY_SCORE: 42

## 2025-04-02 NOTE — ED NOTES
Pt angry in triage, irritable, demanding to go home. Tearful. Upset that she is being asked triage questions. Can't get a reason why she is in the ED. There is a note in the chart from Nurse triage.   S.O at BS trying to convince pt to stay

## 2025-04-02 NOTE — ED TRIAGE NOTES
Leg swelling  Back pain     Triage Assessment (Adult)       Row Name 04/01/25 5451          Triage Assessment    Airway WDL WDL        Respiratory WDL    Respiratory WDL WDL        Cognitive/Neuro/Behavioral WDL    Cognitive/Neuro/Behavioral WDL WDL

## 2025-04-02 NOTE — ED PROVIDER NOTES
[facility] [place])    4/01/25        History     Chief Complaint   Patient presents with    Leg Swelling    Back Pain     HPI  Supriya Feng is a 30 year old female with a past medical history of adjustment disorder and major depression who presents to the emergency department with a chief complaint of leg swelling and back pain.  Patient also complains of generalized abdominal pain, particularly in the lower abdomen.  This pain radiates into her left leg.  Patient also notes that she began passing some golf ball sized clots as well, though overall she thinks her bleeding may have improved.  The patient recently had a baby.    Per chart review, patient recently delivered via vaginal delivery at 40 weeks gestation on March 28, 2025 after induction of labor.    Patient presents to the emergency department today accompanied by her significant other and her baby    I have reviewed the Medications, Allergies, Past Medical and Surgical History, and Social History in the [employer] system.    Past Medical History:   Diagnosis Date    Adjustment disorder with mixed anxiety and depressed mood 03/23/2010    Adjustment disorder with mixed emotional features 03/23/2010    History of cigar smoking 04/29/2014    Low back pain 09/27/2014    Major depression 06/11/2015    Nexplanon removal 06/13/2023    was inserted in 2015 by report    Personal history of urinary tract infection     recurrent    Substance use disorder     Suicidal behavior 02/11/2010    Cutting. Seen in Er @ [facility] for self inflicted laceration.     Past Surgical History:   Procedure Laterality Date    widom teeth       No current facility-administered medications for this encounter.     Current Outpatient Medications   Medication Sig Dispense Refill    acetaminophen (TYLENOL) 325 MG tablet Take 325-650 mg by mouth every 6 hours as needed for mild pain.      ferrous sulfate (FEROSUL) 325 (65 Fe) MG tablet Take 1 tablet  (325 mg) by mouth daily (with breakfast). 60 tablet 0    ibuprofen (ADVIL/MOTRIN) 600 MG tablet Take 1 tablet (600 mg) by mouth every 6 hours as needed for moderate pain. Start after delivery      Prenatal Vit-Fe Fumarate-FA (PRENATAL MULTIVITAMIN  PLUS IRON) 27-1 MG TABS Take by mouth daily.      SENNA-docusate sodium (SENNA S) 8.6-50 MG tablet Take 1 tablet by mouth at bedtime. 90 tablet 0     No Known Allergies  Past medical history, past surgical history, medications, and allergies were reviewed with the patient. Additional pertinent items: None    Social History     Socioeconomic History    Marital status: Single     Spouse name: Sae smith    Number of children: 1    Years of education: Not on file    Highest education level: Not on file   Occupational History    Not on file   Tobacco Use    Smoking status: Former     Current packs/day: 0.00     Types: Cigarettes     Quit date: 2015     Years since quitting: 10.2    Smokeless tobacco: Never   Vaping Use    Vaping status: Former   Substance and Sexual Activity    Alcohol use: Not Currently     Comment: history    Drug use: Not Currently    Sexual activity: Yes     Partners: Male   Other Topics Concern    Not on file   Social History Narrative    Not on file     Social Drivers of Health     Financial Resource Strain: Low Risk  (3/27/2025)    Financial Resource Strain     Within the past 12 months, have you or your family members you live with been unable to get utilities (heat, electricity) when it was really needed?: No   Food Insecurity: Low Risk  (3/27/2025)    Food Insecurity     Within the past 12 months, did you worry that your food would run out before you got money to buy more?: No     Within the past 12 months, did the food you bought just not last and you didn t have money to get more?: No   Transportation Needs: Low Risk  (3/27/2025)    Transportation Needs     Within the past 12 months, has lack of transportation kept you from medical  appointments, getting your medicines, non-medical meetings or appointments, work, or from getting things that you need?: No   Physical Activity: Not on file   Stress: Not on file   Social Connections: Not on file   Interpersonal Safety: Low Risk  (3/27/2025)    Interpersonal Safety     Do you feel physically and emotionally safe where you currently live?: Yes     Within the past 12 months, have you been hit, slapped, kicked or otherwise physically hurt by someone?: No     Within the past 12 months, have you been humiliated or emotionally abused in other ways by your partner or ex-partner?: No   Housing Stability: Low Risk  (3/27/2025)    Housing Stability     Do you have housing? : Yes     Are you worried about losing your housing?: No     Social history was reviewed with the patient. Additional pertinent items: None    Review of Systems  A medically appropriate review of systems was performed with pertinent positives and negatives noted in the HPI, and all other systems negative.    Physical Exam   BP: 124/84  Pulse: 97  Temp: 98.1  F (36.7  C)  Resp: 16  Weight: 103.4 kg (228 lb)  SpO2: 97 %      General: Well nourished, well developed, NAD  HEENT: EOMI, anicteric. NCAT, MMM  Neck: no jugular venous distension, supple, nl ROM  Cardiac: Regular rate and rhythm. No murmurs, rubs, or gallops. Normal S1, S2.  Intact peripheral pulses  Pulm: CTAB, no stridor, wheezes, rales, rhonchi  Abd: Soft, mildly tender to palpation in the lower abdomen without rebound or guarding, nondistended.  No masses palpated.    Genitourinary: Deferred as patient declined  Skin: Warm and dry to the touch.  No rash  Extremities: Trace bilateral LE edema to feet and ankles, no cyanosis, w/w/p, tenderness to left thigh, normal range of motion, distally neurovascularly intact  Neuro: A&Ox3, no gross focal deficits    ED Course        Procedures                        Labs Ordered and Resulted from Time of ED Arrival to Time of ED Departure    COMPREHENSIVE METABOLIC PANEL - Abnormal       Result Value    Sodium 141      Potassium 3.9      Carbon Dioxide (CO2) 23      Anion Gap 12      Urea Nitrogen 13.8      Creatinine 0.80      GFR Estimate >90      Calcium 9.0      Chloride 106      Glucose 97      Alkaline Phosphatase 101      AST 19      ALT 20      Protein Total 6.6      Albumin 3.3 (*)     Bilirubin Total 0.2     ROUTINE UA WITH MICROSCOPIC REFLEX TO CULTURE - Abnormal    Color Urine Light Yellow      Appearance Urine Clear      Glucose Urine Negative      Bilirubin Urine Negative      Ketones Urine Negative      Specific Gravity Urine 1.015      Blood Urine Large (*)     pH Urine 6.5      Protein Albumin Urine Negative      Urobilinogen Urine Normal      Nitrite Urine Negative      Leukocyte Esterase Urine Moderate (*)     Mucus Urine Present (*)     RBC Urine 79 (*)     WBC Urine 29 (*)     Squamous Epithelials Urine 1     CBC WITH PLATELETS AND DIFFERENTIAL - Abnormal    WBC Count 8.6      RBC Count 3.19 (*)     Hemoglobin 10.2 (*)     Hematocrit 30.9 (*)     MCV 97      MCH 32.0      MCHC 33.0      RDW 12.5      Platelet Count 280      % Neutrophils 66      % Lymphocytes 24      % Monocytes 7      % Eosinophils 2      % Basophils 0      % Immature Granulocytes 1      NRBCs per 100 WBC 0      Absolute Neutrophils 5.6      Absolute Lymphocytes 2.1      Absolute Monocytes 0.6      Absolute Eosinophils 0.2      Absolute Basophils 0.0      Absolute Immature Granulocytes 0.0      Absolute NRBCs 0.0     LIPASE - Normal    Lipase 28            US Lower Extremity Venous Duplex Left   Final Result   IMPRESSION:   1.  No deep venous thrombosis in the left lower extremity.      US Pelvis Complete without Transvaginal   Final Result   IMPRESSION:    1. The uterus is enlarged, consistent with the recent postpartum state.   2. The endometrial stripe is mildly heterogeneous, measuring 1.1 cm in thickness. No blood flow is visualized in the endometrial stripe  to increase the suspicion of retained products of conception, but retained products of conception cannot be entirely    excluded with this thickness of the endometrial stripe.   3. Unremarkable appearance of both ovaries.   4. No free fluid in the pelvis.             No results found for this or any previous visit (from the past 24 hours).    Labs, vital signs, and imaging studies were reviewed by me.    Medications   ketorolac (TORADOL) injection 15 mg (15 mg Intravenous $Given 4/1/25 2310)   methylergonovine (METHERGINE) tablet 200 mcg (200 mcg Oral $Given 4/2/25 0121)       Assessments & Plan (with Medical Decision Making)   Supriya Feng is a 30 year old female who Zentz to the emergency department with abdominal pain, back pain, passage of blood clots from the vagina, and leg pain and swelling.  Differential diagnosis includes retained products of conception, UTI, DVT.  Labs, urinalysis, pelvic ultrasound, and left lower extremity ultrasound were ordered to further evaluate the patient in the emergency department.  Medication was ordered for symptomatic relief in the emergency department.    Laboratory workup is remarkable for unremarkable CMP, urinalysis that does not appear consistent with UTI, but does show blood within the urine, likely secondary to vaginal bleeding that is ongoing.  CBC shows hemoglobin of 10.2, normal white blood cell count.  Lipase is within normal limits.    Lower extremity ultrasound negative for DVT.    Pelvic ultrasound shows heterogenous endometrial stripe, retained products of conception cannot be entirely excluded.  Patient was discussed with OB/GYN, they will come see the patient in the emergency department.    Patient was seen by OB/GYN in the emergency department, they recommend a course of Methergine.  Patient may be discharged home with outpatient follow-up.    Critical care was not performed.     Medical Decision Making  The patient's presentation was of high complexity  (an acute health issue posing potential threat to life or bodily function).    The patient's evaluation involved:  ordering and/or review of 3+ test(s) in this encounter (see separate area of note for details)  independent interpretation of testing performed by another health professional (see separate area of note for details)  discussion of management or test interpretation with another health professional (see separate area of note for details)    The patient's management necessitated moderate risk (prescription drug management including medications given in the ED).    Ultrasound images were personally reviewed by me, I agree with the radiology reads.    I have reviewed the nursing notes.    I have reviewed the findings, diagnosis, plan and need for follow up with the patient.    Patient to be discharged home. Advised to follow up with OB/GYN as directed. To return to ER immediately with any new/worsening symptoms. Plan of care discussed with patient who expresses understanding and agrees with plan of care.    New Prescriptions    No medications on file       Final diagnoses:   Abdominal pain, unspecified abdominal location   Back pain, unspecified back location, unspecified back pain laterality, unspecified chronicity   Pain of left lower leg       LUX CHAVEZ MD  4/1/2025   [facility] EMERGENCY DEPARTMENT       Lux Chavez MD  04/03/25 1457

## 2025-04-02 NOTE — CONSULTS
Gynecology Consult Note    DOS: 2025    Supriya Feng   [id number]  1995    Consulting service: ED  Reason for consult: postpartum vaginal bleeding    History of Present Illness  Supriya Feng is a 30 year old  now PPD#5 s/p uncomplicated vaginal delivery with the [facility] CNM team who presents for vaginal bleeding. QBL from delivery was 560 mL. She had a second degree perineal laceration that was repaired.  Her pregnancy was otherwise notable for history of shoulder dystocia, hepatitis B nonimmune, and history of anxiety/depression without medications.    She called care line earlier this evening due to passage of large blood clots.  It was recommended she present to the emergency department for evaluation.    Patient reports that she has been intermittently passing blood clots, the largest has been the size of a golf ball.  Her vaginal bleeding has been similar to a light to medium period.  It has been difficult to tell how much bleeding she has been having given she has had urinary incontinence.  She has been using up to 10 pads per day for the incontinence, and the blood is often mixed with urine.  She is not feeling symptomatic with the blood loss and denies dizziness and feeling lightheaded.    She is not endorsing any other symptoms.  Denies fevers, chills, chest pain, shortness of breath.    Her hemoglobin in the postpartum period prior to discharge was 9.7.  Her hemoglobin today is stable at 10.2.  Pelvic ultrasound today demonstrates 1.1 cm endometrial stripe without blood flow with low suspicion for retained products of conception.    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[employer])  [x]NOB folder  [x]Dating  [x] 1st trimester screening: MFM referral placed for 1st trimester screening place  [x]Fetal anatomy US ordered  [x] No added risk for PRE-E  [x]No need for utox in labor  []COVID vaccine completed  []Pap    12-23wks________________________    [x]Rubella immune  [x]Hep B NONimmune   [x]Varicella immune  []FLU shot    24-28wk_________________________  [x]EOB folder  [x]Labor plans:FOB, his mom  [x]Prenatal Ed  [x]: no  [x]Infant feeding plan--breast  [x] care provider choice: probably [facility]  [x]PP Contraception likely nexplanon  [x]TDAP declines      29-35 wk________________________    [x]GCT nml results  [x]RSV-declines    36-37 wks______________________   [x] GBS neg  [x]OTC PP meds sent  [x]PP recovery plans/support:Will have 12 weeks of FMLA   []Planning CS-ERAS pkt    38-42 wks______________________  [x]IOL reason/plans: elective, hx of SD, scheduled for 3/27/25  []Postdates BPP        History of shoulder dystocia in prior pregnancy 2024     Priority: Medium     2014: 3.9k   She began pushing at 1945 with good effort. Upon delivery of the fetal head, a shoulder dystocia was noted. The left shoulder was anterior. Oxana maneuver was performed and the baby delivered in under 30 seconds from the time the dystocia was called. No suprapubic pressure was applied. The infant was noted to be moving both arms spontaneously after delivery.       Major depression 2015     Priority: Medium    Low back pain 2014     Priority: Medium    History of cigar smoking 2014     Priority: Medium    Adjustment disorder with mixed anxiety and depressed mood 2010     Priority: Medium     Past Medical History  Past Medical History:   Diagnosis Date    Adjustment disorder with mixed anxiety and depressed mood 2010     Adjustment disorder with mixed emotional features 03/23/2010    History of cigar smoking 04/29/2014    Low back pain 09/27/2014    Major depression 06/11/2015    Nexplanon removal 06/13/2023    was inserted in 2015 by report    Personal history of urinary tract infection     recurrent    Substance use disorder     Suicidal behavior 02/11/2010    Cutting. Seen in Er @ [facility] for self inflicted laceration.     Past Surgical History  Past Surgical History:   Procedure Laterality Date    widom teeth       Social History  Social History     Social History Narrative    Not on file     Social History     Tobacco Use    Smoking status: Former     Current packs/day: 0.00     Types: Cigarettes     Quit date: 2015     Years since quitting: 10.2    Smokeless tobacco: Never   Vaping Use    Vaping status: Former   Substance Use Topics    Alcohol use: Not Currently     Comment: history    Drug use: Not Currently     Family History  Family History   Problem Relation Age of Onset    Substance Abuse Mother     Mental Illness Mother     Diabetes Father     Cancer Father     Diabetes Paternal Grandmother     Cancer Paternal Grandmother     Diabetes Paternal Grandfather     Diabetes Other      Medications  Prior to Admission Medications   Prescriptions Last Dose Informant Patient Reported? Taking?   Prenatal Vit-Fe Fumarate-FA (PRENATAL MULTIVITAMIN  PLUS IRON) 27-1 MG TABS   Yes No   Sig: Take by mouth daily.   SENNA-docusate sodium (SENNA S) 8.6-50 MG tablet   No No   Sig: Take 1 tablet by mouth at bedtime.   acetaminophen (TYLENOL) 325 MG tablet   Yes No   Sig: Take 325-650 mg by mouth every 6 hours as needed for mild pain.   ferrous sulfate (FEROSUL) 325 (65 Fe) MG tablet   No No   Sig: Take 1 tablet (325 mg) by mouth daily (with breakfast).   ibuprofen (ADVIL/MOTRIN) 600 MG tablet   No No   Sig: Take 1 tablet (600 mg) by mouth every 6 hours as needed for moderate pain. Start after delivery      Facility-Administered  Medications: None     Allergies  No Known Allergies    Physical Exam   /84   Pulse 97   Temp 98.1  F (36.7  C) (Oral)   Resp 16   Wt 103.4 kg (228 lb)   LMP 06/19/2024 (Exact Date)   SpO2 97%   BMI 36.80 kg/m    Body mass index is 36.8 kg/m .  General   healthy and alert, no distress  HEENT   no thyromegaly  Cardiovascular  normal rate  Respiratory  normal rate and work of breathing  Psychiatric  appropriate mood and affect                      Gastrointestinal       abdomen soft, non-tender, non-distended  Genitourinary  declined speculum exam    Labs/Studies:   Results for orders placed or performed during the hospital encounter of 04/01/25   US Pelvis Complete without Transvaginal     Status: None    Narrative    EXAM: ULTRASOUND PELVIS  LOCATION: [facility]  DATE/TIME: 4/1/2025 11:55 PM CDT    INDICATION: Recent delivery. Abdominal pain.  COMPARISON: None.    TECHNIQUE: Transabdominal scans were performed.    FINDINGS:    UTERUS: 18.2 x 8.7 x 4.1 cm in long axis, short axis and transverse dimensions, respectively. Mildly heterogeneous uterine echotexture.    ENDOMETRIUM: The endometrial stripe is mildly heterogeneous, measuring up to 1.1 cm in thickness. No blood flow is visualized within the endometrial stripe. A small amount of fluid and a few small echogenic foci that could represent gas are present in   the endometrial cavity of the lower uterine segment, within normal physiologic limits for a recent delivery.    RIGHT OVARY: Unremarkable, measuring 3.8 x 2.8 x 1.9 cm.    LEFT OVARY: Unremarkable, measuring 4.1 x 3.4 x 2.5 cm.    OTHER: No adnexal masses. No free fluid in the pelvis.      Impression    IMPRESSION:   1. The uterus is enlarged, consistent with the recent postpartum state.  2. The endometrial stripe is mildly heterogeneous, measuring 1.1 cm in thickness. No blood flow is visualized in the endometrial stripe to increase the suspicion of  retained products of conception, but retained products of conception cannot be entirely   excluded with this thickness of the endometrial stripe.  3. Unremarkable appearance of both ovaries.  4. No free fluid in the pelvis.    US Lower Extremity Venous Duplex Left     Status: None    Narrative    EXAM: US LOWER EXTREMITY VENOUS DUPLEX LEFT  LOCATION: [facility]  DATE: 4/1/2025    INDICATION: leg swlling  COMPARISON: None.  TECHNIQUE: Venous Duplex ultrasound of the left lower extremity with and without compression, augmentation and duplex. Color flow and spectral Doppler with waveform analysis performed.    FINDINGS: Exam includes the common femoral, femoral, popliteal, and contralateral common femoral veins as well as segmentally visualized deep calf veins and greater saphenous vein.     LEFT: No deep vein thrombosis. No superficial thrombophlebitis. No popliteal cyst.      Impression    IMPRESSION:  1.  No deep venous thrombosis in the left lower extremity.   Comprehensive metabolic panel     Status: Abnormal   Result Value Ref Range    Sodium 141 135 - 145 mmol/L    Potassium 3.9 3.4 - 5.3 mmol/L    Carbon Dioxide (CO2) 23 22 - 29 mmol/L    Anion Gap 12 7 - 15 mmol/L    Urea Nitrogen 13.8 6.0 - 20.0 mg/dL    Creatinine 0.80 0.51 - 0.95 mg/dL    GFR Estimate >90 >60 mL/min/1.73m2    Calcium 9.0 8.8 - 10.4 mg/dL    Chloride 106 98 - 107 mmol/L    Glucose 97 70 - 99 mg/dL    Alkaline Phosphatase 101 40 - 150 U/L    AST 19 0 - 45 U/L    ALT 20 0 - 50 U/L    Protein Total 6.6 6.4 - 8.3 g/dL    Albumin 3.3 (L) 3.5 - 5.2 g/dL    Bilirubin Total 0.2 <=1.2 mg/dL   Lipase     Status: Normal   Result Value Ref Range    Lipase 28 13 - 60 U/L   UA with Microscopic reflex to Culture     Status: Abnormal    Specimen: Urine, Midstream   Result Value Ref Range    Color Urine Light Yellow Colorless, Straw, Light Yellow, Yellow    Appearance Urine Clear Clear    Glucose Urine Negative  Negative mg/dL    Bilirubin Urine Negative Negative    Ketones Urine Negative Negative mg/dL    Specific Gravity Urine 1.015 1.003 - 1.035    Blood Urine Large (A) Negative    pH Urine 6.5 5.0 - 7.0    Protein Albumin Urine Negative Negative mg/dL    Urobilinogen Urine Normal Normal mg/dL    Nitrite Urine Negative Negative    Leukocyte Esterase Urine Moderate (A) Negative    Mucus Urine Present (A) None Seen /LPF    RBC Urine 79 (H) <=2 /HPF    WBC Urine 29 (H) <=5 /HPF    Squamous Epithelials Urine 1 <=1 /HPF    Narrative    Urine Culture ordered based on laboratory criteria   CBC with platelets and differential     Status: Abnormal   Result Value Ref Range    WBC Count 8.6 4.0 - 11.0 10e3/uL    RBC Count 3.19 (L) 3.80 - 5.20 10e6/uL    Hemoglobin 10.2 (L) 11.7 - 15.7 g/dL    Hematocrit 30.9 (L) 35.0 - 47.0 %    MCV 97 78 - 100 fL    MCH 32.0 26.5 - 33.0 pg    MCHC 33.0 31.5 - 36.5 g/dL    RDW 12.5 10.0 - 15.0 %    Platelet Count 280 150 - 450 10e3/uL    % Neutrophils 66 %    % Lymphocytes 24 %    % Monocytes 7 %    % Eosinophils 2 %    % Basophils 0 %    % Immature Granulocytes 1 %    NRBCs per 100 WBC 0 <1 /100    Absolute Neutrophils 5.6 1.6 - 8.3 10e3/uL    Absolute Lymphocytes 2.1 0.8 - 5.3 10e3/uL    Absolute Monocytes 0.6 0.0 - 1.3 10e3/uL    Absolute Eosinophils 0.2 0.0 - 0.7 10e3/uL    Absolute Basophils 0.0 0.0 - 0.2 10e3/uL    Absolute Immature Granulocytes 0.0 <=0.4 10e3/uL    Absolute NRBCs 0.0 10e3/uL   Extra Tube     Status: None    Narrative    The following orders were created for panel order Extra Tube.  Procedure                               Abnormality         Status                     ---------                               -----------         ------                     Extra Blue Top Tube[[id number]]                             Final result               Extra Red Top Tube[[id number]]                              Final result                 Please view results for these tests on the  individual orders.   Extra Blue Top Tube     Status: None   Result Value Ref Range    Hold Specimen JIC    Extra Red Top Tube     Status: None   Result Value Ref Range    Hold Specimen JIC    CBC with platelets differential     Status: Abnormal    Narrative    The following orders were created for panel order CBC with platelets differential.  Procedure                               Abnormality         Status                     ---------                               -----------         ------                     CBC with platelets and ...[[id number]]  Abnormal            Final result                 Please view results for these tests on the individual orders.     Imaging:   Transabdominal Pelvic US ()  1. The uterus is enlarged, consistent with the recent postpartum state.  2. The endometrial stripe is mildly heterogeneous, measuring 1.1 cm in thickness. No blood flow is visualized in the endometrial stripe to increase the suspicion of retained products of conception, but retained products of conception cannot be entirely   excluded with this thickness of the endometrial stripe.  3. Unremarkable appearance of both ovaries.  4. No free fluid in the pelvis.     Assessment/Plan: 30 year old  now PPD#5 s/p uncomplicated vaginal delivery with the [facility] CNM team who presents for vaginal bleeding.  Hemoglobin stable. Pelvic ultrasound reassuring with appropriate endometrial stripe without blood flow and low suspicion for retained products of conception.  Vital signs stable.  Abdomen nontender.  Declined speculum exam.    At this time, patient is appropriate for outpatient management.  Discussed short course of oral methergine for bleeding prophylaxis.  Patient will follow-up in clinic with the CNM team later this week.    This patient was staffed with Dr. Nice.     Melissa Perkins MD  Obstetrics & Gynecology, PGY-3  2025 1:11 AM

## 2025-04-02 NOTE — TELEPHONE ENCOUNTER
Juanjose Villavicencio advised patient to go to [facility].  Nehal Dominguez RN/FNA

## 2025-04-02 NOTE — DISCHARGE INSTRUCTIONS
TODAY'S VISIT:  You were seen today for abdominal pain, leg pain   -   - If you had any labs or imaging/radiology tests performed today, you should also discuss these tests with your usual provider.     FOLLOW-UP:  Please make an appointment to follow up with:  - Your Primary Care Provider. If you do not have a PCP, please call the Primary Care Center (phone: [phone] for an appointment    - Have your provider review the results from today's visit with you again to make sure no further follow-up or additional testing is needed based on those results.     RETURN TO THE EMERGENCY DEPARTMENT  Return to the Emergency Department at any time for any new or worsening symptoms or any concerns.

## 2025-04-02 NOTE — ED PROVIDER NOTES
[facility] EMERGENCY DEPARTMENT ([facility])    4/01/25          History     Chief Complaint   Patient presents with    Leg Swelling    Back Pain     HPI  Supriya Feng is a 30 year old female who was recently pregnant on 3/28/2025 with past medical history of substance abuse disorder, major depression and adjustment disorder presents to the ED due to leg swelling and back pain.    Past Medical History  Past Medical History:   Diagnosis Date    Adjustment disorder with mixed anxiety and depressed mood 03/23/2010    Adjustment disorder with mixed emotional features 03/23/2010    History of cigar smoking 04/29/2014    Low back pain 09/27/2014    Major depression 06/11/2015    Nexplanon removal 06/13/2023    was inserted in 2015 by report    Personal history of urinary tract infection     recurrent    Substance use disorder     Suicidal behavior 02/11/2010    Cutting. Seen in Er @ [facility] for self inflicted laceration.     Past Surgical History:   Procedure Laterality Date    widom teeth       acetaminophen (TYLENOL) 325 MG tablet  ferrous sulfate (FEROSUL) 325 (65 Fe) MG tablet  ibuprofen (ADVIL/MOTRIN) 600 MG tablet  Prenatal Vit-Fe Fumarate-FA (PRENATAL MULTIVITAMIN  PLUS IRON) 27-1 MG TABS  SENNA-docusate sodium (SENNA S) 8.6-50 MG tablet      No Known Allergies  Family History  Family History   Problem Relation Age of Onset    Substance Abuse Mother     Mental Illness Mother     Diabetes Father     Cancer Father     Diabetes Paternal Grandmother     Cancer Paternal Grandmother     Diabetes Paternal Grandfather     Diabetes Other      Social History   Social History     Tobacco Use    Smoking status: Former     Current packs/day: 0.00     Types: Cigarettes     Quit date: 2015     Years since quitting: 10.2    Smokeless tobacco: Never   Vaping Use    Vaping status: Former   Substance Use Topics    Alcohol use: Not Currently     Comment: history    Drug use: Not Currently      Past medical  "history, past surgical history, medications, allergies, family history, and social history were reviewed with the patient. No additional pertinent items.   A complete review of systems was performed with pertinent positives and negatives noted in the HPI, and all other systems negative.    Physical Exam   BP: 124/84  Pulse: 97  Temp: 98.1  F (36.7  C)  Resp: 16  Weight: 103.4 kg (228 lb)  SpO2: 97 %  Physical Exam  ***    ED Course, Procedures, & Data      Procedures       {ED Course Selections (Optional):863488}  {ED Sepsis CMS Documentation (Optional):070332::\" \"}       No results found for any visits on 04/01/25.  Medications - No data to display  Labs Ordered and Resulted from Time of ED Arrival to Time of ED Departure - No data to display  No orders to display          {Critical Care Performed?:014721}    Assessment & Plan    ***    I have reviewed the nursing notes. I have reviewed the findings, diagnosis, plan and need for follow up with the patient.    New Prescriptions    No medications on file       Final diagnoses:   None       ***  [facility] EMERGENCY DEPARTMENT  4/1/2025  "

## 2025-04-02 NOTE — TELEPHONE ENCOUNTER
Patient had the baby on Friday 3/28/2025.  Supriya gave consent to communicate with Jimmy.   Spouse Jimmy is calling.  Supriya delivered naturally.  Now is passing big blood clots and pain in vagina and back and now is radiating down left leg.  Patient is having lower abdominal pain and back pain.      Non-Primary Care Provider (Specialties/Contract Clinics)    Provider consult indicated.     Reason for page: Leg pain, swollen ankles    Page sent to  CNM on call by RN at 8:15.     Nehal Dominguez RN      Non-Primary Care Provider (Specialties/Contract Clinics)      Provider, IGOR, returning page to Nurse Advisors at 8:22 pm    Provider recommended plan of care: Go to [facility].    Provider Recommendation Follow Up:   Reached patient/caregiver. Informed of provider's recommendations. Patient verbalized understanding and agrees with the plan.           Nehal Dominguez RN            Reason for Disposition   Swelling only of ankle   Swelling of both ankles (i.e., pedal edema)   Postpartum (from 0 to 6 weeks after delivery)   [1] Thigh or calf pain AND [2] only 1 side AND [3] present > 1 hour    Additional Information   Negative: SEVERE difficulty breathing (e.g., struggling for each breath, speaks in single words)   Negative: Sounds like a life-threatening emergency to the triager   Negative: Followed a leg injury   Negative: [1] Followed an insect bite AND [2] localized swelling (e.g., small area of puffy or swollen skin)   Negative: Swelling only of knee   Negative: Chest pain   Negative: Followed an ankle injury   Negative: [1] Followed a bee sting AND [2] localized swelling (e.g., small area of puffy or swollen skin)   Negative: [1] Followed an insect bite AND [2] localized swelling (e.g., small area of puffy or swollen skin)   Negative: Ankle pain is main symptom   Negative: SEVERE difficulty breathing (e.g., struggling for each breath, speaks in single words)   Negative: Looks like a broken bone or dislocated  joint (e.g., crooked or deformed)   Negative: Sounds like a life-threatening emergency to the triager   Negative: Chest pain   Negative: Followed a leg injury   Negative: [1] Followed an insect bite AND [2] localized swelling (e.g., small area of puffy or swollen skin)   Negative: Swelling of one ankle joint   Negative: Swelling of knee is main symptom   Negative: Pregnant   Negative: Chest pain   Negative: [1] Difficulty breathing AND [2] new-onset or worsening   Negative: SEVERE leg swelling (e.g., swelling extends above knee, entire leg is swollen)   Negative: [1] Red area or streak AND [2] fever   Negative: [1] Swelling is painful to touch AND [2] fever   Negative: [1] Cast on leg or ankle AND [2] now increased pain   Negative: Swelling of face, arm or hands  (Exception: Slight puffiness of fingers.)   Negative: SEVERE headache   Negative: New blurred vision or vision changes   Negative: Upper abdominal pain lasts > 1 hour   Negative: Patient sounds very sick or weak to the triager   Negative: Preeclampsia or high blood pressure during pregnancy   Negative: Systolic BP >= 140 OR Diastolic BP >= 90    Protocols used: Postpartum - Leg Swelling and Edema-A-AH, Ankle Swelling-A-AH, Leg Swelling and Edema-A-AH

## 2025-04-02 NOTE — TELEPHONE ENCOUNTER
Received a call from Nehal Dominguez and she is an FNA calling because Supriya is a  s/p  3/28 who called with concerns for passing large blood clots, swollen ankles and pain radiating down her left leg. FNA protocol recommends sending patient to ED for evaluation. IGOR agrees with this recommendation and Nehal will call the patient back and advise this. Reviewed [facility] ED and she will be evaluated by OB team.     MEENU Gallagher CNM

## 2025-04-02 NOTE — ED TRIAGE NOTES
Triage Assessment (Adult)       Row Name 04/01/25 7150          Triage Assessment    Airway WDL WDL        Respiratory WDL    Respiratory WDL WDL        Cognitive/Neuro/Behavioral WDL    Cognitive/Neuro/Behavioral WDL WDL

## 2025-04-03 LAB — BACTERIA UR CULT: NO GROWTH

## 2025-04-09 ENCOUNTER — PRENATAL OFFICE VISIT (OUTPATIENT)
Dept: OBGYN | Facility: CLINIC | Age: 30
End: 2025-04-09
Attending: OBSTETRICS & GYNECOLOGY
Payer: MEDICAID

## 2025-04-09 VITALS
DIASTOLIC BLOOD PRESSURE: 77 MMHG | BODY MASS INDEX: 35.35 KG/M2 | SYSTOLIC BLOOD PRESSURE: 109 MMHG | HEART RATE: 88 BPM | WEIGHT: 219 LBS

## 2025-04-09 PROCEDURE — G0463 HOSPITAL OUTPT CLINIC VISIT: HCPCS | Performed by: OBSTETRICS & GYNECOLOGY

## 2025-04-09 RX ORDER — VITAMIN A ACETATE, ASCORBIC ACID, CHOLECALCIFEROL, ALPHA-TOCOPHEROL ACETATE, DL, THIAMINE MONONITRATE, RIBOFLAVIN, NIACINAMIDE, PYRIDOXINE HYDROCHLORIDE, FOLIC ACID, CYANOCOBALAMIN, BIOTIN, CALCIUM PANTOTHENATE, CALCIUM CARBONATE, FERROUS FUMARATE, POTASSIUM IODIDE, MAGNESIUM OXIDE, ZINC OXIDE AND CUPRIC OXIDE 2500; 80; 400; 10; 3; 3.4; 20; 20; 1; 12; 300; 6; 120; 27; 150; 30; 15; 2 [IU]/1; MG/1; [IU]/1; [IU]/1; MG/1; MG/1; MG/1; MG/1; MG/1; UG/1; UG/1; MG/1; MG/1; MG/1; UG/1; UG/1; UG/1; MG/1
TABLET, FILM COATED ORAL
Qty: 90 TABLET | Refills: 3 | Status: SHIPPED | OUTPATIENT
Start: 2025-04-09

## 2025-04-09 RX ORDER — ESTRADIOL 0.1 MG/G
CREAM VAGINAL
Qty: 60 G | Refills: 1 | Status: SHIPPED | OUTPATIENT
Start: 2025-04-09

## 2025-04-09 ASSESSMENT — ANXIETY QUESTIONNAIRES
2. NOT BEING ABLE TO STOP OR CONTROL WORRYING: SEVERAL DAYS
1. FEELING NERVOUS, ANXIOUS, OR ON EDGE: SEVERAL DAYS
6. BECOMING EASILY ANNOYED OR IRRITABLE: SEVERAL DAYS
3. WORRYING TOO MUCH ABOUT DIFFERENT THINGS: NOT AT ALL
7. FEELING AFRAID AS IF SOMETHING AWFUL MIGHT HAPPEN: SEVERAL DAYS
GAD7 TOTAL SCORE: 4
5. BEING SO RESTLESS THAT IT IS HARD TO SIT STILL: NOT AT ALL
GAD7 TOTAL SCORE: 4

## 2025-04-09 ASSESSMENT — PATIENT HEALTH QUESTIONNAIRE - PHQ9
SUM OF ALL RESPONSES TO PHQ QUESTIONS 1-9: 2
5. POOR APPETITE OR OVEREATING: NOT AT ALL

## 2025-04-09 ASSESSMENT — PAIN SCALES - GENERAL: PAINLEVEL_OUTOF10: SEVERE PAIN (8)

## 2025-04-09 NOTE — PROGRESS NOTES
Chief Complaint   Patient presents with    Post Partum Exam     DD 3/28/2025              SUBJECTIVE:   Supriya Feng is here for her 6-week postpartum checkup.     PHQ-9 score:   Hx of Abuse:  No    Delivery Date: 3/28/2025.    Delivering provider:  Laila Red CNM.    Type of delivery:  .  Perineum:  tear, with repair.     Delivery complications:   Infant gender:  boy, weight 8 pounds 6.9 oz.  Feeding Method:  .  Complications reported with feeding:  none, infant thriving .    Bleeding:  None.  Duration:  .  Menses resumed:  No  Bowel/Urinary problems:  No    Contraception Planned:  Wants info on all types of birth  She  has not had intercourse since delivery..         Guerda Mcclellan LPN

## 2025-04-09 NOTE — PATIENT INSTRUCTIONS
Thank you for trusting us with your care!   Please be aware, if you are on Mychart, you may see your results prior to your providers review. If labs are abnormal, we will call or message you on [facility] with a follow up plan.    If you need to contact us for questions about:  Symptoms, Scheduling & Medical Questions; Non-urgent (2-3 day response) YUPIQ message, Urgent (needing response today) [phone] (if after 3:30pm next day response)   Prescriptions: Please call your Pharmacy   Billing: Alexi [phone] or [facility]:[phone]

## 2025-04-09 NOTE — PROGRESS NOTES
31 yo  CNM patient here with concerns for vaginal pain and dryness in the setting of recent  with repair.   Otherwise doing well    /77   Pulse 88   Wt 99.3 kg (219 lb)   LMP 2024 (Exact Date)   Breastfeeding Yes   BMI 35.35 kg/m    EG: posterior obstetric laceration is healing well with visible sutures. No erythema nor edema.     A/P: normal PP healing  Offered trial topical E2    Gali Benitez MD

## 2025-05-07 ENCOUNTER — PRENATAL OFFICE VISIT (OUTPATIENT)
Dept: OBGYN | Facility: CLINIC | Age: 30
End: 2025-05-07
Attending: ADVANCED PRACTICE MIDWIFE
Payer: COMMERCIAL

## 2025-05-07 VITALS
DIASTOLIC BLOOD PRESSURE: 79 MMHG | WEIGHT: 223.8 LBS | BODY MASS INDEX: 36.12 KG/M2 | HEART RATE: 88 BPM | SYSTOLIC BLOOD PRESSURE: 113 MMHG

## 2025-05-07 DIAGNOSIS — F43.23 ADJUSTMENT DISORDER WITH MIXED ANXIETY AND DEPRESSED MOOD: ICD-10-CM

## 2025-05-07 DIAGNOSIS — N89.8 VAGINAL ODOR: ICD-10-CM

## 2025-05-07 DIAGNOSIS — Z30.011 INITIATION OF ORAL CONTRACEPTION: ICD-10-CM

## 2025-05-07 DIAGNOSIS — Z12.4 CERVICAL CANCER SCREENING: ICD-10-CM

## 2025-05-07 DIAGNOSIS — F32.9 MAJOR DEPRESSIVE DISORDER WITH CURRENT ACTIVE EPISODE, UNSPECIFIED DEPRESSION EPISODE SEVERITY, UNSPECIFIED WHETHER RECURRENT: ICD-10-CM

## 2025-05-07 LAB
BACTERIAL VAGINOSIS VAG-IMP: POSITIVE
CANDIDA DNA VAG QL NAA+PROBE: DETECTED
CANDIDA GLABRATA / CANDIDA KRUSEI DNA: NOT DETECTED
T VAGINALIS DNA VAG QL NAA+PROBE: NOT DETECTED

## 2025-05-07 PROCEDURE — 87624 HPV HI-RISK TYP POOLED RSLT: CPT | Performed by: ADVANCED PRACTICE MIDWIFE

## 2025-05-07 PROCEDURE — 81515 NFCT DS BV&VAGINITIS DNA ALG: CPT | Performed by: ADVANCED PRACTICE MIDWIFE

## 2025-05-07 PROCEDURE — G0463 HOSPITAL OUTPT CLINIC VISIT: HCPCS | Performed by: ADVANCED PRACTICE MIDWIFE

## 2025-05-07 RX ORDER — ACETAMINOPHEN AND CODEINE PHOSPHATE 120; 12 MG/5ML; MG/5ML
0.35 SOLUTION ORAL DAILY
Qty: 84 TABLET | Refills: 3 | Status: SHIPPED | OUTPATIENT
Start: 2025-05-07

## 2025-05-07 ASSESSMENT — EDINBURGH POSTNATAL DEPRESSION SCALE (EPDS)
I HAVE BEEN ABLE TO LAUGH AND SEE THE FUNNY SIDE OF THINGS: AS MUCH AS I ALWAYS COULD
TOTAL SCORE: 7
I HAVE FELT SCARED OR PANICKY FOR NO GOOD REASON: NO, NOT MUCH
I HAVE BEEN ANXIOUS OR WORRIED FOR NO GOOD REASON: HARDLY EVER
THE THOUGHT OF HARMING MYSELF HAS OCCURRED TO ME: NEVER
I HAVE LOOKED FORWARD WITH ENJOYMENT TO THINGS: AS MUCH AS I EVER DID
I HAVE BEEN SO UNHAPPY THAT I HAVE HAD DIFFICULTY SLEEPING: NOT VERY OFTEN
THINGS HAVE BEEN GETTING ON TOP OF ME: NO, MOST OF THE TIME I HAVE COPED QUITE WELL
I HAVE FELT SAD OR MISERABLE: NOT VERY OFTEN
I HAVE BLAMED MYSELF UNNECESSARILY WHEN THINGS WENT WRONG: NOT VERY OFTEN
I HAVE BEEN SO UNHAPPY THAT I HAVE BEEN CRYING: ONLY OCCASIONALLY

## 2025-05-07 NOTE — NURSING NOTE
Chief Complaint   Patient presents with    Postpartum Care     SUBJECTIVE:   Supriya Feng is here for her 6-week postpartum checkup.     EPDS score: 7    Delivery Date: 2025.    Delivering provider: Laila Red CNM.    Type of delivery:  .  Perineum:  tear, with repair.     Delivery complications: None  Infant gender:  boy, weight 8 pounds 6.9 oz.  Feeding Method:   and Bottlefed.  Complications reported with feeding:  inadequate milk supply. Higher demand, overall going well.    Bleeding:  Heavy.  Duration:  2 weeks heavy, 4 weeks total.  Menses resumed:  No  Bowel/Urinary problems:  No    Contraception Planned:  either nexplanon or pill  She  has not had intercourse since delivery

## 2025-05-07 NOTE — PROGRESS NOTES
"6-week Postpartum Visit:       Subjective:     Supriya Feng is a 30 year old yo  here for her 6-week postpartum checkup.     HPI:   Supriya is doing overall well. Her only concern today is slight vaginal odor and vaginal dryness. She has no concerns for itching, burning, bleeding, or pain when she goes to the bathroom.     Reports that her mental health is good. She is coping well. Sometimes struggles with depressive thoughts. She knows her resources and will reach out to visit with a therapist if she feels it's necessary. Not interested in medication at this time.     DELIVERY DATE: 3/28/2025     of a viable boy, weight 3.799 kg (8 lb 6 oz) ., with no complications.  PERINEAL LACERATION: 2nd  PP HGB: 9.7  FEEDING METHOD:  POSTPARTUM SUPPORT: Prashant (partner)  HX OF DEPRESSION:Yes. Coping. No concerns at this time.     CONTRACEPTION PLANNED: mini pill / progesterone only pill  She  has not had intercourse since delivery.  HX OF ABUSE:No  OTHER HPI: Bleeding stopped, lac healing well.  No bowel or bladder concerns    Review of Systems  -A 12 point comprehensive review of systems was negative except as noted above.  -Prenatal history and intrapartum course were also reviewed today.  -Social, Medical and family history reviewed      Objective:     EPDS: 7, \"no\" to #10  PHQ2:   EXAM:  /79   Pulse 88   Wt 101.5 kg (223 lb 12.8 oz)   LMP 2024 (Exact Date)   BMI 36.12 kg/m    GENERAL APPEARANCE: healthy, alert and no distress  ABDOMEN: soft, nontender, diastasis recti closing  : normal cervix, adnexae, and uterus without masses or discharge and rectal exam normal without masses-guaiac negative stool  PSYCH: mentation appears normal and affect normal/bright  PELVIC EXAM:  Vulva: BUS WNL, no lesions noted  Vagina: Discharge scant, no lesions, well rugated, good tone  Cervix: Smooth, pink, no visible lesions,.    Immunization History   Administered Date(s) Administered    COVID-19 " Bivalent 18+ (Moderna) 2023    DTAP (<7y) 1994, 1994, 1994, 1995, 1998    HIB (PRP-T) 1994, 1994, 1994, 1995    HIB, Unspecified 1994, 1994, 1994, 1995    HPV 04/10/2008, 2008, 2011    HPV Quadrivalent 04/10/2008, 2008    HepB 1994, 1994, 1994    HepB, Unspecified 1994, 1994    Hepatitis B, Peds (Engerix-B/Recombivax HB) 1994    Historical DTP/aP 1994, 1994, 1994, 1995    Influenza Vaccine >6 months,quad, PF 2019    MMR (MMRII) 1995, 2002    Meningococcal (Menomune ) 2006    OPV, trivalent, live 1998    Polio, Unspecified 1994, 1994, 1994    Poliovirus, inactivated (IPV) 1994, 1994, 1994, 2002    TD,PF 7+ (Tenivac) 2006    Td, Absorbed, Pf, Adult, Lf Unspecified 2006    Varicella (Varivax) 1996, 2006     Immunization status: up to date and documented    Assessment:     Normal postpartum exam after .  Cervical cancer screening  Hx of depression and anxiety   Vaginal odor    Plan:     Discussed normal pelvic exam and that changes in odor/ discharge can be common postpartum. Plan to obtain MVP to assess for bv/yeast.   Reviewed warning signs of worsening depression and anxiety. Offered to start medication management. Donell declines at this time, and will reach out if she needs more support.   Adjustment to parenting, self care and importance of a support system discussed. Postpartum education given including: postpartum mood changes and postpartum depression.  Return of fertility discussed. Plans POP for contraception. She might come back to do nexplanon at a later time. Education given on this method. Resumption of intercourse reviewed with possible changes in libido and vaginal lubrication while  nursing.  Return as needed or at time interval for next  routine pap, pelvic, or breast exam.  Discussed resumption of exercise and normal timing of return to pre-pregnancy weight. Postpartum physical activity reviewed and encouraged modified abdominal crunches and Kegels daily. Encouraged integrating exercise, such as walking 20-30mns daily.   Reviewed warning signs of pelvic pain, excessive bleeding or abdominal pain, fever/chills, or signs of breast infection.  Breastfeeding support resources given    Pap smear was obtained.        MEENU Ortiz CNM

## 2025-05-07 NOTE — PATIENT INSTRUCTIONS
Thank you for trusting us with your care!   Please be aware, if you are on Mychart, you may see your results prior to your providers review. If labs are abnormal, we will call or message you on [facility] with a follow up plan.    If you need to contact us for questions about:  Symptoms, Scheduling & Medical Questions; Non-urgent (2-3 day response) [facility] message, Urgent (needing response today) [phone] (if after 3:30pm next day response)   Prescriptions: Please call your Pharmacy   Billing: Alexi [phone] or [facility]:[phone]

## 2025-05-08 ENCOUNTER — RESULTS FOLLOW-UP (OUTPATIENT)
Dept: OBGYN | Facility: CLINIC | Age: 30
End: 2025-05-08

## 2025-05-08 DIAGNOSIS — B37.31 YEAST INFECTION OF THE VAGINA: Primary | ICD-10-CM

## 2025-05-08 DIAGNOSIS — B96.89 BACTERIAL VAGINOSIS: ICD-10-CM

## 2025-05-08 DIAGNOSIS — N76.0 BACTERIAL VAGINOSIS: ICD-10-CM

## 2025-05-08 LAB
HPV HR 12 DNA CVX QL NAA+PROBE: NEGATIVE
HPV16 DNA CVX QL NAA+PROBE: NEGATIVE
HPV18 DNA CVX QL NAA+PROBE: NEGATIVE
HUMAN PAPILLOMA VIRUS FINAL DIAGNOSIS: NORMAL

## 2025-05-08 RX ORDER — CLINDAMYCIN HYDROCHLORIDE 300 MG/1
300 CAPSULE ORAL 2 TIMES DAILY
Qty: 14 CAPSULE | Refills: 0 | Status: SHIPPED | OUTPATIENT
Start: 2025-05-08 | End: 2025-05-15

## 2025-05-08 RX ORDER — FLUCONAZOLE 150 MG/1
150 TABLET ORAL
Qty: 3 TABLET | Refills: 0 | Status: SHIPPED | OUTPATIENT
Start: 2025-05-08 | End: 2025-05-15

## 2025-05-12 LAB
BKR AP ASSOCIATED HPV REPORT: NORMAL
BKR LAB AP GYN ADEQUACY: NORMAL
BKR LAB AP GYN INTERPRETATION: NORMAL
BKR LAB AP PREVIOUS ABNORMAL: NORMAL
PATH REPORT.COMMENTS IMP SPEC: NORMAL
PATH REPORT.COMMENTS IMP SPEC: NORMAL
PATH REPORT.RELEVANT HX SPEC: NORMAL

## 2025-07-28 ENCOUNTER — MEDICAL CORRESPONDENCE (OUTPATIENT)
Dept: HEALTH INFORMATION MANAGEMENT | Facility: CLINIC | Age: 30
End: 2025-07-28
Payer: COMMERCIAL